# Patient Record
Sex: MALE | Race: WHITE
[De-identification: names, ages, dates, MRNs, and addresses within clinical notes are randomized per-mention and may not be internally consistent; named-entity substitution may affect disease eponyms.]

---

## 2018-11-23 ENCOUNTER — HOSPITAL ENCOUNTER (INPATIENT)
Dept: HOSPITAL 75 - IRF | Age: 78
LOS: 6 days | Discharge: ANOTHER HOSPITAL | DRG: 559 | End: 2018-11-29
Attending: INTERNAL MEDICINE | Admitting: PHYSICAL MEDICINE & REHABILITATION
Payer: MEDICARE

## 2018-11-23 VITALS — WEIGHT: 193.5 LBS | HEIGHT: 64 IN | BODY MASS INDEX: 33.03 KG/M2

## 2018-11-23 VITALS — SYSTOLIC BLOOD PRESSURE: 169 MMHG | DIASTOLIC BLOOD PRESSURE: 79 MMHG

## 2018-11-23 VITALS — SYSTOLIC BLOOD PRESSURE: 168 MMHG | DIASTOLIC BLOOD PRESSURE: 74 MMHG

## 2018-11-23 VITALS — SYSTOLIC BLOOD PRESSURE: 150 MMHG | DIASTOLIC BLOOD PRESSURE: 70 MMHG

## 2018-11-23 DIAGNOSIS — T81.44XA: ICD-10-CM

## 2018-11-23 DIAGNOSIS — K59.00: ICD-10-CM

## 2018-11-23 DIAGNOSIS — Z95.1: ICD-10-CM

## 2018-11-23 DIAGNOSIS — R33.9: ICD-10-CM

## 2018-11-23 DIAGNOSIS — D62: ICD-10-CM

## 2018-11-23 DIAGNOSIS — E78.5: ICD-10-CM

## 2018-11-23 DIAGNOSIS — Z47.89: Primary | ICD-10-CM

## 2018-11-23 DIAGNOSIS — A04.72: ICD-10-CM

## 2018-11-23 DIAGNOSIS — T84.216A: ICD-10-CM

## 2018-11-23 DIAGNOSIS — Z79.4: ICD-10-CM

## 2018-11-23 DIAGNOSIS — N18.9: ICD-10-CM

## 2018-11-23 DIAGNOSIS — I25.10: ICD-10-CM

## 2018-11-23 DIAGNOSIS — N40.1: ICD-10-CM

## 2018-11-23 DIAGNOSIS — E11.9: ICD-10-CM

## 2018-11-23 DIAGNOSIS — Z87.891: ICD-10-CM

## 2018-11-23 DIAGNOSIS — G06.1: ICD-10-CM

## 2018-11-23 DIAGNOSIS — N31.9: ICD-10-CM

## 2018-11-23 DIAGNOSIS — I48.0: ICD-10-CM

## 2018-11-23 DIAGNOSIS — I12.9: ICD-10-CM

## 2018-11-23 PROCEDURE — 82962 GLUCOSE BLOOD TEST: CPT

## 2018-11-23 PROCEDURE — 80202 ASSAY OF VANCOMYCIN: CPT

## 2018-11-23 PROCEDURE — 87081 CULTURE SCREEN ONLY: CPT

## 2018-11-23 PROCEDURE — 83540 ASSAY OF IRON: CPT

## 2018-11-23 PROCEDURE — 86141 C-REACTIVE PROTEIN HS: CPT

## 2018-11-23 PROCEDURE — 80053 COMPREHEN METABOLIC PANEL: CPT

## 2018-11-23 PROCEDURE — 72131 CT LUMBAR SPINE W/O DYE: CPT

## 2018-11-23 PROCEDURE — 36415 COLL VENOUS BLD VENIPUNCTURE: CPT

## 2018-11-23 PROCEDURE — 85025 COMPLETE CBC W/AUTO DIFF WBC: CPT

## 2018-11-23 PROCEDURE — 85652 RBC SED RATE AUTOMATED: CPT

## 2018-11-23 RX ADMIN — LACTOBACILLUS ACIDOPHILUS / LACTOBACILLUS BULGARICUS SCH GM: 100 MILLION CFU STRENGTH GRANULES at 16:56

## 2018-11-23 RX ADMIN — OXYBUTYNIN CHLORIDE SCH MG: 5 TABLET ORAL at 20:18

## 2018-11-23 RX ADMIN — METOPROLOL TARTRATE SCH MG: 25 TABLET, FILM COATED ORAL at 20:18

## 2018-11-23 RX ADMIN — INSULIN ASPART SCH UNIT: 100 INJECTION, SOLUTION INTRAVENOUS; SUBCUTANEOUS at 17:08

## 2018-11-23 RX ADMIN — ACETAMINOPHEN SCH MG: 325 TABLET ORAL at 17:02

## 2018-11-23 RX ADMIN — SODIUM CHLORIDE SCH MLS/HR: 900 INJECTION, SOLUTION INTRAVENOUS at 20:16

## 2018-11-23 RX ADMIN — CHOLESTYRAMINE SCH GM: 4 POWDER, FOR SUSPENSION ORAL at 17:08

## 2018-11-23 RX ADMIN — LACTOBACILLUS ACIDOPHILUS / LACTOBACILLUS BULGARICUS SCH GM: 100 MILLION CFU STRENGTH GRANULES at 20:18

## 2018-11-23 RX ADMIN — METFORMIN HYDROCHLORIDE SCH MG: 500 TABLET, FILM COATED ORAL at 16:55

## 2018-11-23 RX ADMIN — CHOLESTYRAMINE SCH GM: 4 POWDER, FOR SUSPENSION ORAL at 21:51

## 2018-11-23 RX ADMIN — COMPOUNDING SYRUP VEHICLE SCH ML: 1 SYRUP at 17:05

## 2018-11-23 RX ADMIN — INSULIN ASPART SCH UNIT: 100 INJECTION, SOLUTION INTRAVENOUS; SUBCUTANEOUS at 20:59

## 2018-11-23 NOTE — PHYSICAL THERAPY EVALUATION
PT Evaluation-General


Medical Diagnosis


Admission Date





Medical Diagnosis:  sepsis, spinal abscess


Onset Date:  2018





Therapy Diagnosis


Therapy Diagnosis:  impaired mobility, strength, endurance





Height/Weight


Height (Feet):  5


Height (Inches):  4.00


Weight (Pounds):  186


Weight (Ounces):  0.0





Precautions


Precautions/Isolations:  Contact Isolation





Referral


Physician:  Lance


Reason for Referral:  Evaluation/Treatment





Medical History


Pertinent Medical History:  Atrial Fib, CABG, CAD, DM, HTN


Current History


Patient admitted to ICU following hardware replacement of spine due to wound 

infection.


Reviewed History:  Yes





Social History


Home:  Single Level


Current Living Status:  Spouse


Entry Into Home:  Stairs With Railing


PT Steps Into Home:  3





Prior/Core FIM


Prior Level of Function


              Functional [place] Measure


0=Not Assessed/NA   4=Minimal Assistance


1=Total Assistance   5=Supervision or Setup


2=Maximal Assistance   6=Modified [place]


3=Moderate Assistance   7=Complete IndependenceIRFPAI Quality Coding Scale











6 Independent with activity with or without an assistive device


 


5  Patient requires set up or clean up by helper.  Patient completes activity  

by  themselves


 


4 Supervision or touching assist (CGA). [place] provide cues , steadying assist


 


3 The helper provides less than half the effort to complete the activity


 


2 The helper provides more than half the effort to complete the activity


 


1 Dependent.  The helper does all the effort to complete an activity 


 


7 Patient refused to complete or attempt activity


 


9 The patient did not perform the activity before the current illness or injury


 


88 Not attempted due to Medical conditions or safety concerns








Bed Mobility:  6


Transfers (B,C,W/C) (FIM):  6


Gait:  6


Stairs:  6


Wheelchair Mobility:  6


Patient states he already has a rolling walker, 4 wheeled walker, and wheelchair





PT Evaluation-Current


Subjective


Patient in bed pre tx, agrees to PT, has 10/10 pain in low back and right hip 

and groin.  Nurse notified and he got pain meds.  Patient is very tired and has 

increased pain because he had a lot of bleeding from back earlier and doctor 

had to cauterize.





Pt/Family Goals


Patient states he wants fo be able to go back home and take care of himself.





Objective


Patient Orientation:  Person, Place, Situation


Attachments:  Drains


TLSO





ROM/Strength


ROM Lower Extremities


WNL


Strenght Lower Extremities


3+/5 gross bilateral lower extremities





Neuromuscular


(Tone, Coordination, Reflexes)


NT





Sensory


Vision:  Functional


Hearing:  Impaired


Sensation Right Lower Extremit:  Intact


Sensation Left Lower Extremity:  Intact





Transfers


              Functional [place] Measure


0=Not Assessed/NA   4=Minimal Assistance


1=Total Assistance   5=Supervision or Setup


2=Maximal Assistance   6=Modified [place]


3=Moderate Assistance   7=Complete IndependenceIRFPAI Quality Coding Scale











6 Independent with activity with or without an assistive device


 


5  Patient requires set up or clean up by helper.  Patient completes activity  

by  themselves


 


4 Supervision or touching assist (CGA). [place] provide cues , steadying assist


 


3 The helper provides less than half the effort to complete the activity


 


2 The helper provides more than half the effort to complete the activity


 


1 Dependent.  The helper does all the effort to complete an activity 


 


7 Patient refused to complete or attempt activity


 


9 The patient did not perform the activity before the current illness or injury


 


88 Not attempted due to Medical conditions or safety concerns








Transfers (B, C, W/C) (FIM):  3


Scooting:  3


Rolling:  3


Roll Left to Right (QC):  2


Supine to/from Sit:  3


Sit to/from Stand:  4


bed t/f WC(FIM only if WC use):  4


Sit to Lying (QC):  2


Lying to Sitting/Side of Bed(Q:  2


Sit to Stand (QC):  3


Chair/Bed-to-Chair Xfer(QC):  3


Patient was only able to perform bed mobility and transfers and a little 

ambulation.  By the time he got to rehab he insisted on going back to bed due 

to pain and fatigue.    Patient performs bed mobility with mod assist, supine <-

> sit with mod assist, sit <-> stand with min assist, transfers with min 

assist.  Cues for safety and hand placement.





Gait


Does the Patient Walk?:  Yes


Mode of Locomotion:  Walk


Anticipated Mode of Locomotion:  Walk


Gait (FIM):  1


Walk 10 feet (QC):  4


Walk 50 ft with 2 Turns(QC):  88


Walk 150 ft (QC):  88


Walking 10ft/uneven surface-QC:  88


Distance:  10'


Gait Level of Assist:  4


Gait Persons Needed:  1


Gait Assistive Device:  FWW


Comments/Gait Description


Patient ambulated 10' with a rolling walker with CGA.  Gait is very antalgic, 

slow.  He could not walk any more due to pain and fatigue.





Wheelchair Training


Does the Pt Use a Wheelchair?:  Yes


Wheelchair (FIM):  1


Type of Wheelchair:  Manual





Stairs


If not tested on admit;explain


Patient could not perform stairs due to pain and fatigue.  He had recent blood 

loss from wound in back and had to be cauterized and came to rehab right after.





Balance


Sitting Static:  Fair


Sitting Dynamic:  Fair


Standing Static:  Fair


 Standing Dynamic:  Fair





Assessment/Needs


Patient has impaired mobility, strength, endurance post spinal surgery.  He 

also has a wound vac in back.


Rehab Potential:  Guarded





PT Short Term Goals


Short Term Goals


Time Frame:  2018


Transfers (B,C,W/C) (FIM):  4


Gait (FIM):  2


Gait Distance Comment:  50'


Gait Level of Assist:  4


Gait Assistive Device:  FWW





PT Long Term Goals


Long Term Goals


PT Long Term Goals Time Frame:  Dec 14, 2018


Transfers (B,C,W/C) (FIM):  5


Sit to Lying (QC):  4


Lying-Sitting on Side/Bed(QC):  4


Sit to Stand (QC):  4


Rollin


Roll Left to Right (QC):  4


Chair/Bed-to-Chair Xfer(QC):  4


Car Transfer (QC):  4


Gait (FIM):  5


Distance:  150'


Walk 10 feet (QC):  4


Walk 10ft-Uneven Surface(QC):  4


Walk 50ft with 2 Turns (QC):  4


Walk 150 ft (QC):  4


Gait Level of Assist:  5


Gait Assistive Device:  FWW


Stairs (FIM):  2


# of Steps:  4


1 Step (curb) (QC):  4


4 Steps (QC):  4


Stairs Level Of Assist:  4





PT Plan


Problem List


Problem List:  Activity Tolerance, Functional Strength, Safety, Balance, Gait, 

Transfer, Bed Mobility, ROM





Treatment/Plan


Treatment Plan:  Continue Plan of Care


Treatment Plan:  Bed Mobility, Concurrent Therapy, Education, Functional 

Activity Samina, Functional Strength, Group Therapy, Gait, Safety, Therapeutic 

Exercise, Transfers


Treatment Duration:  Dec 14, 2018


Frequency:  Modified Program (IRF)


Estimated Hrs Per Day:  1.5 hours per day


Patient and/or Family Agrees t:  Yes





Safety Risks/Education


Patient Education:  Gait Training, Transfer Techniques, Reviewed Precautions, 

Correct Positioning, W/C Management, Safety Issues


Teaching Recipient:  Patient


Teaching Methods:  Demonstration, Discussion


Response to Teaching:  Reinforcement Needed





Discharge Recommendations


Plan


Patient will perform bed mobility and transfer training, balance and endurance 

training, functional strengthening, stair training, gait training, and education

, to improve functional mobility and independence at home.


Therapy D/C Recommendations:  Home w/ Family Support





Time/GCodes


Time In:  1300


Time Out:  1330


Total Billed Treatment Time:  30


Total Billed Treatment


1 visit


FRANCIE 30'











MARY SHEPARD PT 2018 13:44

## 2018-11-23 NOTE — ST COGNITIVE LINGUISTIC EVAL
Speech Evaluation-General


Medical Diagnosis


sepsis, spinal abscess


Onset Date:  2018





Therapy Diagnosis


Therapy Diagnosis:  Cognitive-communication





Precautions


Precautions:  Fall


Precautions/Isolations:  Contact Isolation





Medical History


Pertinent Medical History:  Atrial Fib, CABG, CAD, DM, HTN


Reviewed History:  Yes





Social History


Current Living Status:  Spouse





Speech PLF-Current Status


Prior Level of Function





Patient lived at home with his wife. Prior to back surgery in October he


was independent with all daily living tasks.





Subjective


Patient was pleasant and cooperative.





Language Eval: Auditory


Comprehends Simple Yes/No Ques:  Functional


Follows 1-Step Commands:  Mild


Follows Complex Directions:  Moderate


Follows General Conversations:  Mild





Language Eval: Verbal Language


Completes Spontaneous Greeting:  Functional


Produces Auto, Serial Info:  Functional


Imitates Simple Words/Phrases:  Functional


Word Finding:  Mild


Requests Basic Needs:  Mild


States Basic Personal Info:  Mild


Expresses Complex Ideas:  Moderate





Language Evaluation: Reading


Did not test.





Cognitive


Patient Orientation


Patient is oriented to all concepts.





Objective Cognitive Domain


Attention:  Mild


Memory:  Moderate


Problem Solving:  Mild


Executive Functions:  Mild





Objective


Formal/Standardized Tests


[facility] Cognitive/Communication


Results


Memory: for Immediate Recall 3:3, Delayed Recall 3:3, Advanced Recall: 2:3 with 

cues, Organization/Sequencing: 3-4 step: 3:4, Reasoning Skills: 3:4, Verbal 

Expression: Automatic: Mild deficit, Responsive Naming: Mild deficit


Oral Motor/Speech Production


Within Functional Limits


Impression


Patient is an alert and oriented 79 y/o male who recently underwent back 

surgery. Formal testing indicates he has mild to moderate memory and problem 

solving deficits related to his daily needs and immediate living environment. 

He is recommended for skilled services to address these deficits to achieve his 

highest potential functional level for optimal safety and independence,





Communication/Social Cognition


Comprehension:  4


Expression:  4


Social Interaction:  4


Problem Solving:  3


Memory:  3





Speech Patient Assess


Expression of Ideas/Wants:  Frequently (2)


Understanding Verbal Content:  Sometimes Understands(2)


Brief Interview-Mental Status:  Yes


Repetition of Three Words:  Two (2)


Temporal Orientation: Year:  Correct (3)


Temporal Orientation: Month:  Accurate within 5 days(2)


Temporal Orientation: Day:  Correct (1)


Recall : Wear to say "Sock":  Yes,after cueing (1)


Recall : Color:  Yes, no cue required (2)


Recall : Bed:  Yes, no cue required (2)


Memory/Recall Ability:  Current season, That he or she is in a hsp/hsp unit





Speech Short Term Goals


Short Term Goals


Short Term Goals


1) Patient will complete memory tasks with 90% accuracy given minimal cuing.


2) Patient will complete problem solving tasks with 90% accuracy given minimal 

cuing.


Time Frame-ST week





Speech Long Term Goals


Long Term Goals


Patient will improve memory and problem solving tasks with minimal cues for 

increased safety and independence at 90% accuracy.





Speech-Plan


Patient/Family Goals


Patient/Family Goals:  


Patient plans to mreturn home with his wife after rehab.





Treatment Plan


Speech Therapy Treatment Plan:  Continue Plan of Care


Patient will work on memory and problem solving while on the rehab unit.


Frequency:  Modified Program (IRF)


Estimated Hrs Per Day:  .5 hour per day


Rehab Potential:  Guarded


Barriers to Learning:  


Memory


Pt/Family Agrees to Plan:  Yes





Safety Risks/Education


Teaching Recipient:  Patient, Significant Other


Teaching Methods:  Discussion


Response to Teaching:  Verbalize Understanding





Time


Speech Therapy Time In:  14:15


Speech Therapy Time Out:  14:30


Total Billed Time:  15


Billed Treatment Time


1, SAURABH Cabrera 2018 14:49

## 2018-11-23 NOTE — OCCUPATIONAL THERAPY EVAL
OT Evaluation-General/PLF


Medical Diagnosis


Admission Date


2018 at 13:25


Medical Diagnosis:  sepsis, spinal abscess


Onset Date:  2018





Therapy Diagnosis


Therapy Diagnosis:  decr self care, weakness, decr funct mob, decr act jj





Height/Weight


Height (Feet):  5


Height (Inches):  4.00


Weight (Pounds):  186


Weight (Ounces):  0.0





Precautions


Precautions/Isolations:  Contact Isolation, Contact/Enteric Isolation (c diff)





Referral


Physician:  Lance


Referral Reason:  Evaluation/Treatment





Medical History


Pertinent Medical History:  Atrial Fib, CABG, CAD, DM, HTN


Additional Medical History


Chronic back pain. BPH, hyperlipidemia. Pt reported cataract surgery and two 

other surgeries on L eye


Current History


18 back surgery. Has been in SNF and [facility]. Pt had spinal abscess 

and is now s/p 2 surgeries (, ) to replace hardware. Today he had 

bleeding in his back and would vac had to be replaced. Also had to cauterize 

bleeders in back.


Reviewed History:  Yes





Social History


Home:  Single Level


Current Living Status:  Spouse


Entry Into Home:  Stairs With Railing


 Steps Into Home:  3





ADL-Prior Level of Function


              Functional [place] Measure


0=Not Assessed/NA   4=Minimal Assistance


1=Total Assistance   5=Supervision or Setup


2=Maximal Assistance   6=Modified [place]


3=Moderate Assistance   7=Complete [place]


ADL PLOF Comments


Pt and wife reported that, prior to back surgery in , he did not need any 

help taking care of himself. He was able to manage all of his basic ADLs, do 

things around the house, manage medications and check book. He still drives and 

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


DME/Equipment:  Bath Chair, Shower, Tall Toilet





OT Current Status


Subjective


Pt seen in room, up in bed on his side, agreeable to OT. Pain reported 10/10 in 

back and in L groin. He had pain meds just prior to arrival on the unit.





Appearance


Alert, cooperative





Mental Status/Objective


Patient Orientation:  Person, Situation


Attachments:  Central Line, IV, Other-See Comments (wound vac)





Current


Glasses/Contacts:  Yes


Hearing Aids:  Yes


Dentures/Partials:  No


Hand Dominance:  Right


Upper Extremity ROM


Grossly WFL bilat


Upper Extremity Strength


Grossly 4/5 bilat





ADL-Treatment


ADL-Current


Pt declined ADLs due to extreme fatigue and pain. He was able to walk only 10 

feet with PT with FWW, CGA and needed mod assist with bed mobility. He reported 

that he hasn't felt like eating much since his surgery. TLSO


              Functional [place] Measure


0=Not Assessed/NA   4=Minimal Assistance


1=Total Assistance   5=Supervision or Setup


2=Maximal Assistance   6=Modified [place]


3=Moderate Assistance   7=Complete IndependenceIRFPAI Quality Coding Scale











6 Independent with activity with or without an assistive device


 


5  Patient requires set up or clean up by helper.  Patient completes activity  

by  themselves


 


4 Supervision or touching assist (CGA). [place] provide cues , steadying assist


 


3 The helper provides less than half the effort to complete the activity


 


2 The helper provides more than half the effort to complete the activity


 


1 Dependent.  The helper does all the effort to complete an activity 


 


7 Patient refused to complete or attempt activity


 


9 The patient did not perform the activity before the current illness or injury


 


88 Not attempted due to Medical conditions or safety concerns











Education


OT Patient Education:  Purpose of tx/functional activities, Rehab process


Teaching Recipient:  Patient, Family


Teaching Methods:  Discussion


Response to Teaching:  Verbalize Understanding





OT Short Term Goals


Short Term Goals


Time Frame:  2018


Eating(FIM):  5


Grooming(FIM):  5


Bathing(FIM):  3


Upper Body Dressing(FIM):  4


Lower Body Dressing(FIM):  3


Toileting(FIM):  3


Toilet/Commode Transfer(FIM):  4


Additional Short Term Goals:  1-Demonstrate ADL Tasks, 2-Verbalize Understanding

, 3-ImproveStrength/[place]


1=Demonstrate adherence to instructed precautions during ADL tasks.


2=Patient will verbalize/demonstrate understanding of assistive devices/

modifications for ADL.


3=Patient will improve strength/tolerance for activity to enable patient to 

perform ADL's.





OT Long Term Goals


Long Term Goals


Time Frame:  Dec 14, 2018


Eating (FIM):  7


Eating (QC):  6


Groomin


Oral Hygiene (QC):  6


Bathing(FIM):  6


Shower/Bathe Self (QC):  6


Upper Body Dressing(FIM):  6


Upper Body Dressing (QC):  6


Lower Body Dressing(FIM):  6


Lower Body Dressing (QC):  6


On/Off Footwear (QC):  6


Toileting(FIM):  6


Toileting Hygiene (QC):  6


Toilet/Commode Transfer(FIM):  6


Toilet/Commode Transfer (QC):  6


Shower Transfer(FIM):  5 (if allowed per wound vac)


Additional Goals:  1-Demonstrate ADL Tasks, 2-Verbalize Understanding, 3-

ImproveStrength/[place]


1=Demonstrate adherence to instructed precautions during ADL tasks.


2=Patient will verbalize/demonstrate understanding of assistive devices/

modifications for ADL.


3=Patient will improve strength/tolerance for activity to enable patient to 

perform ADL's.





OT Education/Plan


Problem List/Assessment


Assessment:  Decreased Activ Tolerance, Decreased UE Strength, Dependent 

Transfers, Impaired Bed Mobility, Impaired Self-Care Skills


Pt would benefit from skilled OT to increase his independence in basic self 

care to allow him to safely return home after back surgery





Discharge Recommendations


Plan/Recommendations:  Continue POC





Treatment Plan/Plan of Care


Treatment,Training & Education:  Yes


Patient would benefit from OT for education, treatment and training to promote 

independence in ADL's, mobility, safety and/or upper extremity function for ADL'

s.


Plan of Care:  ADL Retraining, Functional Mobility, Group Exercise/Act as Ind (

education, exercise, activity tolerance, functional activities, socialization), 

UE Funct Exercise/Act, UE Neuromus Re-Ed/Coord, W/C Management Training


Treatment Duration:  Dec 14, 2018


Frequency:  Modified Program (IRF)


Estimated Hrs Per Day:  1.5 hours per day (1.25 to 1.5)


Agreement:  Yes


Rehab Potential:  Fair





Time/GCodes


Start Time:  13:30


Stop Time:  14:10


Total Time Billed (hr/min):  40


Billed Treatment Time


visit, 40 minutes evaluation moderate intensity











FERNANDO SMITH OT 2018 14:32

## 2018-11-23 NOTE — HISTORY AND PHYSICAL
DATE OF SERVICE:  11/23/2018



CHIEF COMPLAINT:

Difficulty with walking.



HISTORY OF PRESENT ILLNESS:

The patient is an 78-year-old male who recently had spinal surgery earlier this

year, then had a complication of an infection.  He had another procedure

10/12/2018, but still had problems and he was on rehab unit in [place] as well as

a skilled nursing unit in [place].  He was eventually transferred to [facility] and followed by his orthospine Dr. Bustos and seen in consultation by

hospitalist service and cardiology.  The patient was placed on IV antibiotics

for a spinal abscess associated with fever and sepsis, tachycardia,

leukocytosis, rigors.  This was brought under control.  He had a recent UTI

enterococcus, had a course of Cipro for that.  He had postop anemia, had a unit

packed red blood cells.  He had some bleeding from the incision site and he

received a cautery with Dr. Bustos's staff prior to transfer to rehab unit.  He

currently has a wound VAC in place.  Currently, he fatigues easily.  His wife

presents with him and stays overnight.  They live in [place], [place] and he

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  His

original spinal surgery was 06/20/2018.  He is mod assist for bed mobility.  He

has a TLSO worn up.  He complains of fatigue and pain with ambulation 10 feet

with a front wheel walker, contact guard.  Speech therapy noted some cognitive

deficits and the patient's wife indicates that this has been since his surgery

10/12/2018.  He is mod to max assist for transfers.  He requires assistance for

his dressing, bathing.  He is modified independent for eating, set up for

grooming.  The patient also developed C. diff bowel colitis, currently is on

p.o. vancomycin and has contact precautions.



PAST MEDICAL HISTORY:

Atrial fibrillation, on amiodarone, left ventricular hypertrophy,Diastolic

dysfunction, coronary artery disease status post CABG.



PAST SURGICAL HISTORY:

As per above.



ALLERGIES:

KEFLEX, HYDROCODONE.



FAMILY HISTORY:

Noncontributory.



SOCIAL HISTORY:

Essentially as per above.  They live in a one story home in [place], [place]. 

Wife indicates that the patient had been independent prior to all this.  He is

quite hard of hearing and does not have his hearing aids currently.



REVIEW OF SYSTEMS:

A 10-point review of systems significant for weakness, fatigue, back pain,

hearing loss.



MEDICATIONS:

Vancomycin q.6h. p.o., amiodarone 200 mg p.o. daily, Lipitor 5 mg p.o. daily,

amlodipine 5 mg p.o. daily, lisinopril 20 mg p.o. daily, allopurinol 300 mg p.o.

daily, Actos 45 mg p.o. daily, MiraLax 17 grams p.o. at bedtime, Lopressor 25 mg

p.o. b.i.d., Ditropan 5 mg p.o. t.i.d., Tylenol 650 mg p.o. q.6 hours, metformin

500 mg p.o. b.i.d., Questran 4 grams p.o. q.i.d. a.c. and at bedtime, Lactinex 1

gram p.o. q.i.d. a.c. and at bedtime, sliding scale insulin regimen, Zofran 4 mg

q.6 hours p.r.n. nausea or vomiting, tramadol 50 mg 1-2 tablets p.o. q.4 hours

p.r.n. moderate pain.



PHYSICAL EXAMINATION:

GENERAL:  Significant for an elderly male lying in bed, quite hard of hearing. 

No acute distress.

VITAL SIGNS:  He is afebrile, pulse 79, respirations 20, blood pressure 168/74,

O2 sat 93% on room air.

HEENT:  Quite hard of hearing.  Vision and speech grossly intact.  No oral

lesion is noted.

NECK:  Supple without mass.

HEART:  Regular rhythm.

CHEST:  Clear.

ABDOMEN:  Soft, nontender, bowel sounds present.

EXTREMITIES:  No leg edema, no calf tenderness.

SKIN:  Wound VAC in place.

MUSCULOSKELETAL:  He has functional active range of motion in all four limbs.

NEUROLOGIC:  Sensation is grossly intact to touch.  Cognition and memory mildly

impaired.  Strength both lower limbs 3+/5, upper limbs 4/5.  He is right hand

dominant.



IMPRESSION:

1.  Ambulatory dysfunction secondary to spinal abscess with sepsis, treated now

with wound VAC.

2.  Clostridium difficile bowel colitis, on vancomycin p.o. and contact

precautions.

3.  Atrial fibrillation, controlled with medication.

4.  Coronary artery disease, status post CABG.

5.  Diabetes mellitus, controlled with medication.

6.  Postop anemia status post transfusion.

7.  Hyperlipidemia.



PLAN:

The patient is admitted for a comprehensive program of inpatient rehabilitation 
with

goal of maximizing level of functional independence prior to discharge home with

spouse.  The patient will have PT, OT 90 minutes per day each discipline 5 days

a week for 2 weeks for the above goals in mind.  Please see post-admission

physician evaluation, which is a separate document for details of plan of care. 

Speech therapy has done cognitive assessment, will treat as indicated 3 to 5

times a week for 30 to 45 minutes per day regarding improving cognition, memory,

compensatory techniques.  Rehabilitation nursing assist with bowel, bladder,

skin, wound care, medication administration, wound VAC administration, pain

management and  with discharge planning, community reentry. 

Follow up with hospital service and Dr. Bustos and cardiology as per the

schedule.



ESTIMATED LENGTH OF STAY:

Two weeks.



PROGNOSIS:

Rehab prognosis appears good for goal of discharging home with spouse, modified

independent to supervision for ADLs and mobility skills.



DIET:

Regular.



CODE STATUS:

Full code.





Job ID: [id number]

DocumentID: [id number]

Dictated Date:  11/23/2018 21:39:25

Transcription Date: 11/23/2018 23:11:48

Dictated By: CJ BROKC MD

MTDD

## 2018-11-23 NOTE — PM&R POST ADMISSION ASSESSMENT
Post Admission Physician Asses


Date seen by provider:  Nov 23, 2018


Time seen by provider:  20:55


The preadmission screen agrees with the post admission assessment that the 

patient is a good candidate for inpatient rehabilitation.





The patient will have a comprehensive program of inpatient rehabilitation with 

a goal of maximizing level of functional independence prior to discharge home 

with spouse.   The patient will have PT/OT ninety minutes per day, each 

discipline, five days a week for for 2 weeks for gait, strengthening, 

conditioning, balance, ADLs, any patient/family/caregiver training as 

necessary.  Speech therapy to do cognitive assessment and treat as indicated 

for 3 to 5 days a week for 2 weeks for 30 to 45 min per day. Rehabilitation 

nursing to assist with bowel, bladder, skin, wound care,wound vac administration

, medication administration, pain management.   to assist with 

discharge planning, community reentry. SCD's for DVT prophylaxis.





He appears to be well motivated to participate in three hours of therapy a day.

  He should be able to tolerate three hours of therapy a day from a medical and 

surgical standpoint.  He should benefit from the three hours of therapy a day.  

He has a reasonable discharge plan, reasonable discharge rehabilitation goals 

and a supportive family. He has various comorbidities that need to be closely 

monitored with medications and treatments adjusted on a daily basis as needed. 

he may require a 15/7 therapy schedule initially.


These include:


Postop spinal abscess


A FIB


DM


Hyponatremia





Barriers to discharge for this patient who had been independent prior to this 

are for him to be modified independent to supervision for ADLs and mobility 

skills prior to discharge home with spouse, so as to lessen the burden of the 

caregivers. 





Risks for this patient include:


 1.  Fall


 2.  Fracture


 3.  DVT


 4.  Pulmonary embolism


 5.  Wound infection


 6.  Skin breakdown


 7.  Contractures


 8.  Poorly controlled pain


 9.  Urinary retention


10.  UTI


11.  Respiratory infection


12.  Aspiration


13.recurrent A FIB





Estimated Length of Stay:  14 days





Prognosis:  Rehab prognosis appears good for goal of discharge home with spouse 

modified independent to supervision for ADLs and mobility skills.


Date Identified:  Nov 23, 2018


Time Identified:  20:30


Action Plan to Resolve CSMI:  


Transfer meds reviewed with RN


General:  Alert, Cooperative, No Acute Distress


HEENT:  Atraumatic, PERRLA, EOMI, Mucous Memb Moist/Pink, Other ([place] has 

hearing aids but out for the evening)


Neck:  Supple, No JVD


Lungs:  Clear to Auscultation


Heart:  Regular Rate


Abdomen:  Normal Bowel Sounds, Soft, No Tenderness


Extremities:  No Edema


Skin:  Other (has wound vac over incision)


Neuro:  Other (cognitive impairment with memory strength 4/5 uppers 3+/5 lowers 

sensation intact)


Psych/Mental Status:  Other (Mild memory impairment)











CJ BROCK MD Nov 23, 2018 21:25

## 2018-11-23 NOTE — CARDIOLOGY PROGRESS NOTE
Cardiology SOAP Progress Note


Subjective:


No cardiac compliants





Objective:


I&O/Vital Signs











 11/23/18





 13:54


 


Temp 98.9


 


Pulse 70


 


Resp 16


 


B/P (MAP) 150/70 (96)


 


Pulse Ox 100


 


O2 Delivery Room Air








Weight (Pounds):  193


Weight (Ounces):  8.0


Weight (Calculated Kilograms):  87.642790


Constitutional:  No appears stated age; AAO x 3; No apparent distress, No PERRL

, No well-developed, No well-nourished, No other


Respiratory:  No accessory muscle use, No respiratory distress, No chest tender

, No chest expansion is symmetric; chest is bilaterally symmetric; No lungs 

clear to percussion; lungs clear to auscultation; No crackles, No rhonchi, No 

rales, No stridor, No wheezing, No pleural rub, No other


Cardiovascular:  regular rate-rhythm; No irregularly irregular, No extra beats, 

No parasternal heave is noted, No JVD, No edema, No bradycardia, No tachycardia

, No point of maximal impulse, No cardiac thrills are palpable; S1 and S2; No 

gallop/S3, No gallop/S4, No diastolic murmur, No systolic murmur, No friction 

rub, No click, No other


Gastrointestional:  No tender, No soft, No round, No distended, No pulsatile 

mass, No organomegaly, No guarding, No rebound, No tenderness, No hernia, No 

mass, No audible bowel sounds, No abnormal bowel sounds, No abdominal bruits, 

No spleenomegaly, No other


Extremities:  No normal range of motion, No non-tender, No normal inspection, 

No pedal edema, No calf tenderness, No normal capillary refill, No pelvis stable

, No calf tenderness, No inflammation, No pedal edema, No slow capillary refill

, No swelling, No other, No abrasion, No clubbing, No cyanosis, No ecchymosis, 

No laceration, No no lower extremity edema bilateral, No significant edema, No 

tenderness, No wound


Neurologic/Psychiatric:  no motor/sensory deficits, alert, normal mood/affect, 

oriented x 3


Skin:  No normal color, No warm/dry, No cyanosis, No cool, No diaphoresis, No 

damp, No ecchymosis, No jaundice, No mottled, No pallor, No rash, No tattoos/

piercings, No ulcerations, No rash on exposed areas, No ulcerations on exposed 

areas, No other





A/P:


Assessment/Dx:


Assessment/Dx:


Spinal abscess,


Sepsis,


UTI,


History of CAD, CABG,


Atrial fibrillation on amiodarone.


LVH,


Diastolic dysfunction.


Plan:





Spinal abscessstatus post I&D. 


Sepsiscontinue broad-spectrum IV antibiotics as per Dr. Pro.  C. difficile, 

on contact precautions.


Paroxysmal atrial fibrillationcurrently in sinus rhythm.  Continue amiodarone.

  I might consider discontinuing amiodarone but since the patient follows Dr. Eckert in [place] I may defer that decision to him.


CAD/history of CABGcontinue lisinopril and statin therapy.  Aspirin low dose 

once okay with Dr. Pro and Dr. Bustos.  Will add low dose beta blocker for 

atrial fibrillation and CAD.


LVH,


Diastolic dysfunction,


Echocardiogram showed normal LV function.


Patient follows with Dr. Eckert in [place].








Thank you for your consultation. Please call me if you have any questions.








KENDALL Zhu MD, FACP, FACC, FSCAI, FHRS, CCDS


Interventional Cardiology


Cardiac Electrophysiology


Vascular Medicine and Endovascular Interventions











MARCIN ZHU MD Nov 23, 2018 17:12

## 2018-11-24 VITALS — SYSTOLIC BLOOD PRESSURE: 153 MMHG | DIASTOLIC BLOOD PRESSURE: 76 MMHG

## 2018-11-24 VITALS — SYSTOLIC BLOOD PRESSURE: 160 MMHG | DIASTOLIC BLOOD PRESSURE: 73 MMHG

## 2018-11-24 VITALS — DIASTOLIC BLOOD PRESSURE: 74 MMHG | SYSTOLIC BLOOD PRESSURE: 134 MMHG

## 2018-11-24 RX ADMIN — LISINOPRIL SCH MG: 20 TABLET ORAL at 09:39

## 2018-11-24 RX ADMIN — OXYBUTYNIN CHLORIDE SCH MG: 5 TABLET ORAL at 20:13

## 2018-11-24 RX ADMIN — CHOLESTYRAMINE SCH GM: 4 POWDER, FOR SUSPENSION ORAL at 11:23

## 2018-11-24 RX ADMIN — COMPOUNDING SYRUP VEHICLE SCH ML: 1 SYRUP at 23:55

## 2018-11-24 RX ADMIN — CHOLESTYRAMINE SCH GM: 4 POWDER, FOR SUSPENSION ORAL at 06:20

## 2018-11-24 RX ADMIN — LACTOBACILLUS ACIDOPHILUS / LACTOBACILLUS BULGARICUS SCH GM: 100 MILLION CFU STRENGTH GRANULES at 20:13

## 2018-11-24 RX ADMIN — ACETAMINOPHEN SCH MG: 325 TABLET ORAL at 12:48

## 2018-11-24 RX ADMIN — OXYBUTYNIN CHLORIDE SCH MG: 5 TABLET ORAL at 09:39

## 2018-11-24 RX ADMIN — POLYETHYLENE GLYCOL (3350) SCH GM: 17 POWDER, FOR SOLUTION ORAL at 14:41

## 2018-11-24 RX ADMIN — ATORVASTATIN CALCIUM SCH MG: 10 TABLET, FILM COATED ORAL at 09:40

## 2018-11-24 RX ADMIN — LACTOBACILLUS ACIDOPHILUS / LACTOBACILLUS BULGARICUS SCH GM: 100 MILLION CFU STRENGTH GRANULES at 06:48

## 2018-11-24 RX ADMIN — DOCUSATE SODIUM AND SENNOSIDES SCH EA: 8.6; 5 TABLET, FILM COATED ORAL at 14:41

## 2018-11-24 RX ADMIN — DOCUSATE SODIUM AND SENNOSIDES SCH EA: 8.6; 5 TABLET, FILM COATED ORAL at 20:23

## 2018-11-24 RX ADMIN — SODIUM CHLORIDE SCH MLS/HR: 900 INJECTION, SOLUTION INTRAVENOUS at 20:08

## 2018-11-24 RX ADMIN — METOPROLOL TARTRATE SCH MG: 25 TABLET, FILM COATED ORAL at 20:13

## 2018-11-24 RX ADMIN — INSULIN ASPART SCH UNIT: 100 INJECTION, SOLUTION INTRAVENOUS; SUBCUTANEOUS at 15:57

## 2018-11-24 RX ADMIN — COMPOUNDING SYRUP VEHICLE SCH ML: 1 SYRUP at 06:23

## 2018-11-24 RX ADMIN — ACETAMINOPHEN SCH MG: 325 TABLET ORAL at 18:11

## 2018-11-24 RX ADMIN — COMPOUNDING SYRUP VEHICLE SCH ML: 1 SYRUP at 18:11

## 2018-11-24 RX ADMIN — INSULIN ASPART SCH UNIT: 100 INJECTION, SOLUTION INTRAVENOUS; SUBCUTANEOUS at 21:00

## 2018-11-24 RX ADMIN — ACETAMINOPHEN SCH MG: 325 TABLET ORAL at 06:23

## 2018-11-24 RX ADMIN — LACTOBACILLUS ACIDOPHILUS / LACTOBACILLUS BULGARICUS SCH GM: 100 MILLION CFU STRENGTH GRANULES at 11:28

## 2018-11-24 RX ADMIN — ALLOPURINOL SCH MG: 300 TABLET ORAL at 09:39

## 2018-11-24 RX ADMIN — METOPROLOL TARTRATE SCH MG: 25 TABLET, FILM COATED ORAL at 09:39

## 2018-11-24 RX ADMIN — METFORMIN HYDROCHLORIDE SCH MG: 500 TABLET, FILM COATED ORAL at 16:49

## 2018-11-24 RX ADMIN — COMPOUNDING SYRUP VEHICLE SCH ML: 1 SYRUP at 12:52

## 2018-11-24 RX ADMIN — AMIODARONE HYDROCHLORIDE SCH MG: 200 TABLET ORAL at 09:39

## 2018-11-24 RX ADMIN — OXYBUTYNIN CHLORIDE SCH MG: 5 TABLET ORAL at 12:48

## 2018-11-24 RX ADMIN — METFORMIN HYDROCHLORIDE SCH MG: 500 TABLET, FILM COATED ORAL at 06:48

## 2018-11-24 RX ADMIN — ACETAMINOPHEN SCH MG: 325 TABLET ORAL at 23:49

## 2018-11-24 RX ADMIN — POLYETHYLENE GLYCOL (3350) SCH GM: 17 POWDER, FOR SOLUTION ORAL at 20:23

## 2018-11-24 RX ADMIN — PIOGLITAZONE SCH MG: 30 TABLET ORAL at 06:48

## 2018-11-24 RX ADMIN — LACTOBACILLUS ACIDOPHILUS / LACTOBACILLUS BULGARICUS SCH GM: 100 MILLION CFU STRENGTH GRANULES at 16:49

## 2018-11-24 RX ADMIN — COMPOUNDING SYRUP VEHICLE SCH ML: 1 SYRUP at 00:12

## 2018-11-24 RX ADMIN — INSULIN ASPART SCH UNIT: 100 INJECTION, SOLUTION INTRAVENOUS; SUBCUTANEOUS at 06:00

## 2018-11-24 RX ADMIN — AMLODIPINE BESYLATE SCH MG: 5 TABLET ORAL at 09:39

## 2018-11-24 RX ADMIN — ACETAMINOPHEN SCH MG: 325 TABLET ORAL at 00:12

## 2018-11-24 RX ADMIN — INSULIN ASPART SCH UNIT: 100 INJECTION, SOLUTION INTRAVENOUS; SUBCUTANEOUS at 11:22

## 2018-11-24 NOTE — OCCUPATIONAL THER DAILY NOTE
OT Current Status-Daily Note


Subjective


Pt seen in room, up in bed, agreeable to OT. Pain reported 7-8/10 and had 

recent pain meds.





Appearance


Alert, cooperative





Mental Status/Objective


              Functional [place] Measure


0=Not Assessed/NA   4=Minimal Assistance


1=Total Assistance   5=Supervision or Setup


2=Maximal Assistance   6=Modified [place]


3=Moderate Assistance   7=Complete [place]


Attachments:  Central Line, Other-See Comments (wound vac)





ADL-Treatment


Co-treat with PT due to significant decreased activity tolerance, pain, need 

for two different professionals to address needs. PT worked on bed mobility, 

balance, transfers while OT worked on ADLs, UE function. Pt sup-sit EOB for 

sponge bath, dressing, requiring supine rest breaks due to pain. Toilet/

transfer to [facility] beside bed, then pt walked briefly, using FWW, requiring second 

person assist to manage wound vac safely. Returned to recliner to brush teeth. 

Needed help to put TLSO on/off. Pt given one bilat UE ex to do when up and also 

provided with pink sponge with instructions to squeeze it 20 reps, both to help 

increase UE strength and manage edema in UEs. Pt left up in recliner, all needs 

met.


              Functional [place] Measure


0=Not Assessed/NA   4=Minimal Assistance


1=Total Assistance   5=Supervision or Setup


2=Maximal Assistance   6=Modified [place]


3=Moderate Assistance   7=Complete IndependenceIRFPAI Quality Coding Scale











6 Independent with activity with or without an assistive device


 


5  Patient requires set up or clean up by helper.  Patient completes activity  

by  themselves


 


4 Supervision or touching assist (CGA). [place] provide cues , steadying assist


 


3 The helper provides less than half the effort to complete the activity


 


2 The helper provides more than half the effort to complete the activity


 


1 Dependent.  The helper does all the effort to complete an activity 


 


7 Patient refused to complete or attempt activity


 


9 The patient did not perform the activity before the current illness or injury


 


88 Not attempted due to Medical conditions or safety concerns








Eating (FIM):  5 (Setup. Able to feed himself and get a drink. No dentures)


Eating (QC):  5 (setup)


Grooming (FIM):  5 (Setup to brush teeth, wash face and hands, seated)


Oral Hygiene (QC):  5 (setup)


Bathing (FIM):  3 (70%. Sponge bath, seated at EOB. Rolled side to side for 

washing bottom.)


Bathing Location:  L Arm, R Arm, L Upper Leg, R Upper Leg, Chest, Abdomen, 

Perineal Area


Shower/Bathe Self (QC):  3


Upper Body (FIM):  5 (Donned t shirt with setup. Setup to don TLSO)


Upper Body Dressing (QC):  5 (setup)


Lower Body Dressing (FIM):  2 (Help to doff/don slipper socks, help to get 

pants over feet. Pt pulled pants to thigh, then help to get pants up over 

bottom. Stood mod assist)


Lower Body Dressing (QC):  2


On/Off Footwear (QC):  1 (Unable )


Toileting (FIM):  1 (Two person assist when using BSC, one to manage standing 

and one to manage clothing. Unable to wipe in back. Setup with urinal, needing 

help to empty it)


Toileting Hygiene (QC):  1 (Two person)


Toilet/Commode Transfer (FIM):  3 (Mod assist bed to BSC beside bed)


Toilet Transfer (QC):  3


Shower Transfer(FIM):  0 (Not addressed due to wound vac)





Education


OT Patient Education:  Exercise program, Modified ADL techniques, Progress 

toward Goal/Update tx plan, Purpose of tx/functional activities, Safety issues, 

Transfer techniques, Use of adapted equipment


Teaching Recipient:  Patient, Family


Teaching Methods:  Demonstration, Discussion


Response to Teaching:  Verbalize Understanding, Return Demonstration, 

Reinforcement Needed





OT Short Term Goals


Short Term Goals


Time Frame:  2018


Eating(FIM):  5


Grooming(FIM):  5


Bathing(FIM):  3


Upper Body Dressing(FIM):  4


Lower Body Dressing(FIM):  3


Toileting(FIM):  3


Toilet/Commode Transfer(FIM):  4


Additional Short Term Goals:  1-Demonstrate ADL Tasks, 2-Verbalize Understanding

, 3-ImproveStrength/Samina


1=Demonstrate adherence to instructed precautions during ADL tasks.


2=Patient will verbalize/demonstrate understanding of assistive devices/

modifications for ADL.


3=Patient will improve strength/tolerance for activity to enable patient to 

perform ADL's.





OT Long Term Goals


Long Term Goals


Time Frame:  Dec 14, 2018


Eating (FIM):  7


Eating (QC):  6


Groomin


Oral Hygiene (QC):  6


Bathing(FIM):  6


Shower/Bathe Self (QC):  6


Upper Body Dressing(FIM):  6


Upper Body Dressing (QC):  6


Lower Body Dressing(FIM):  6


Lower Body Dressing (QC):  6


On/Off Footwear (QC):  6


Toileting(FIM):  6


Toileting Hygiene (QC):  6


Toilet/Commode Transfer(FIM):  6


Toilet/Commode Transfer (QC):  6


Shower Transfer(FIM):  5 (if allowed per wound vac)


Additional Goals:  1-Demonstrate ADL Tasks, 2-Verbalize Understanding, 3-

ImproveStrength/Samina


1=Demonstrate adherence to instructed precautions during ADL tasks.


2=Patient will verbalize/demonstrate understanding of assistive devices/

modifications for ADL.


3=Patient will improve strength/tolerance for activity to enable patient to 

perform ADL's.





OT Education/Plan


Problem List/Assessment


Pt would benefit from skilled OT to increase his independence in basic self 

care to allow him to safely return home after back surgery





Discharge Recommendations


Plan/Recommendations:  Continue POC





Treatment Plan/Plan of Care


Patient would benefit from OT for education, treatment and training to promote 

independence in ADL's, mobility, safety and/or upper extremity function for ADL'

s.


Plan of Care:  ADL Retraining, Functional Mobility, Group Exercise/Act as Ind (

education, exercise, activity tolerance, functional activities, socialization), 

UE Funct Exercise/Act, UE Neuromus Re-Ed/Coord, W/C Management Training


Treatment Duration:  Dec 14, 2018


Frequency:  Modified Program (IRF)


Estimated Hrs Per Day:  1.5 hours per day (1.25 to 1.5)


Agreement:  Yes


Rehab Potential:  Fair





Time/GCodes


Start Time:  08:40


Stop Time:  09:30


Total Time Billed (hr/min):  50


Billed Treatment Time


visit, 45 minutes ADL, 5 minutes exercise  Co-tx with PT











FERNANDO SMITH OT 2018 10:05

## 2018-11-24 NOTE — CARDIOLOGY PROGRESS NOTE
Cardiology SOAP Progress Note


Subjective:


No cardiac complaints.





Objective:


I&O/Vital Signs











 11/24/18 11/24/18





 06:00 09:38


 


Temp 98.1 


 


Pulse 71 82


 


Resp 18 


 


B/P (MAP) 160/73 (102) 153/76 (101)


 


Pulse Ox 93 


 


O2 Delivery Room Air 














 11/24/18





 00:00


 


Intake Total 450 ml


 


Output Total 225 ml


 


Balance 225 ml








Weight (Pounds):  193


Weight (Ounces):  8.0


Weight (Calculated Kilograms):  87.780278


Constitutional:  No appears stated age; AAO x 3; No apparent distress, No PERRL

, No well-developed, No well-nourished, No other


Respiratory:  No accessory muscle use, No respiratory distress, No chest tender

, No chest expansion is symmetric; chest is bilaterally symmetric; No lungs 

clear to percussion; lungs clear to auscultation; No crackles, No rhonchi, No 

rales, No stridor, No wheezing, No pleural rub, No other


Cardiovascular:  regular rate-rhythm; No irregularly irregular, No extra beats, 

No parasternal heave is noted, No JVD, No edema, No bradycardia, No tachycardia

, No point of maximal impulse, No cardiac thrills are palpable; S1 and S2; No 

gallop/S3, No gallop/S4, No diastolic murmur, No systolic murmur, No friction 

rub, No click, No other


Gastrointestional:  No tender, No soft, No round, No distended, No pulsatile 

mass, No organomegaly, No guarding, No rebound, No tenderness, No hernia, No 

mass, No audible bowel sounds, No abnormal bowel sounds, No abdominal bruits, 

No spleenomegaly, No other


Extremities:  No normal range of motion, No non-tender, No normal inspection, 

No pedal edema, No calf tenderness, No normal capillary refill, No pelvis stable

, No calf tenderness, No inflammation, No pedal edema, No slow capillary refill

, No swelling, No other, No abrasion, No clubbing, No cyanosis, No ecchymosis, 

No laceration, No no lower extremity edema bilateral, No significant edema, No 

tenderness, No wound


Neurologic/Psychiatric:  no motor/sensory deficits, alert, normal mood/affect, 

oriented x 3


Skin:  No normal color, No warm/dry, No cyanosis, No cool, No diaphoresis, No 

damp, No ecchymosis, No jaundice, No mottled, No pallor, No rash, No tattoos/

piercings, No ulcerations, No rash on exposed areas, No ulcerations on exposed 

areas, No other





Results/Procedures:


Labs


Laboratory Tests


11/23/18 18:42: Vancomycin Level Trough 20.6H


11/24/18 06:22: Glucometer 119H


11/24/18 11:20: Glucometer 125H








A/P:


Assessment/Dx:


Assessment/Dx:


Spinal abscess,


Sepsis,


UTI,


History of CAD, CABG,


Atrial fibrillation on amiodarone.


LVH,


Diastolic dysfunction.


Plan:





Spinal abscessstatus post I&D. 


Sepsiscontinue broad-spectrum IV antibiotics as per Dr. Pro.  C. difficile, 

on contact precautions.


Paroxysmal atrial fibrillationcurrently in sinus rhythm.  Continue amiodarone.

  I might consider discontinuing amiodarone but since the patient follows Dr. Eckert in [place] I may defer that decision to him.


CAD/history of CABGcontinue lisinopril and statin therapy.  Aspirin low dose 

once okay with Dr. Pro and Dr. Bustos.  Will add low dose beta blocker for 

atrial fibrillation and CAD.


LVH,


Diastolic dysfunction,


Echocardiogram showed normal LV function.


Patient follows with Dr. Eckert in [place].








Thank you for your consultation. Please call me if you have any questions.








KENDALL Zhu MD, FACP, FACC, FSCAI, FHRS, CCDS


Interventional Cardiology


Cardiac Electrophysiology


Vascular Medicine and Endovascular Interventions











MARCIN ZHU MD Nov 24, 2018 11:53 am

## 2018-11-24 NOTE — PM & R (SOAP) PROGRESS NOTE
Subjective


This was a face to face visit with the patient.


Date Seen by Provider:  Nov 24, 2018


Time Seen by Provider:  07:45


Subjective/Events-last exam


Patient was seen in his room this AM Patients spouse spent the night Patient c/

o rt groin pain but non tender to palpation and Patient denies hx of OA of the 

hip will monitor Patient without diarrhea or loose stools but remains with 

contact precautions due to C DIF bowel colitis continues on Vancomycin,

Adjusting well to unit but may require 15/7 therapy schedule due to pain and 

easy fatigue.Patient mod assist for transfers


Review of Systems


Musculoskeletal:  leg pain





Objective


Physician Exam


Last Set of Vital Signs





Vital Signs








  Date Time  Temp Pulse Resp B/P (MAP) Pulse Ox O2 Delivery O2 Flow Rate FiO2


 


11/24/18 06:00 98.1 71 18 160/73 (102) 93 Room Air  





Capillary Refill :


I&O











Intake and Output 


 


 11/24/18





 00:00


 


Intake Total 450 ml


 


Output Total 225 ml


 


Balance 225 ml


 


 


 


Intake Oral 200 ml


 


IV Total 250 ml


 


Output Urine Total 225 ml


 


# Bowel Movements 1


 


Daily Weight Change No








General:  Alert, Cooperative, No Acute Distress


HEENT:  Atraumatic, PERRLA, EOMI, Mucous Memb Moist/Pink, Other ([facility] has 

hearing aids but out for the evening)


Neck:  Supple, No JVD


Lungs:  Clear to Auscultation


Heart:  Regular Rate


Abdomen:  Normal Bowel Sounds, Soft, No Tenderness


Extremities:  No Edema


Skin:  Other (has wound vac over incision)


Neuro:  Other (cognitive impairment with memory strength 4/5 uppers 3+/5 lowers 

sensation intact)


Psych/Mental Status:  Other (Mild memory impairment)





Results


Lab Data


Laboratory Tests


11/23/18 18:42: Vancomycin Level Trough 20.6H


11/24/18 06:22: Glucometer 119H





Assessment/Plan


Assessment and Plan


Spinal abscess s/p Spinal surgery OSH now with wound vac


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


urinary retention Monitor for improvement with Bladder scanning Consult  as 

needed


A FIB controlled with med


CAD s/p CABG


DM controlled with med


Postop anemia s/p transfusion


HLP





Plan Continue PT/OT/Antibiotics/contact precautions


Team Conference 11-28-18


Goal return home with spouse


F/U with DR Bustos and Hospitalist and Cardiologist


Co-Morbidities that are continuing to impact the rehab process: (include details

)











CJ BROCK MD Nov 24, 2018 08:11

## 2018-11-24 NOTE — PHYSICAL THERAPY DAILY NOTE
PT Daily Note-Current


Subjective


Pt reports (R) groin and lumbar pain 7-8/10 with movement and when sitting 

upright.





Pain





   Numeric Pain Scale:  7


   Location:  Right


   Location Body Site:  Thigh


   Pain Description:  Ache, Dull





Mental Status


Patient Orientation:  Person, Place, Time, Situation


Attachments:  Central Line, Other-See Comments


wound vac on lumbar spine





Transfers


              Functional [place] Measure


0=Not Assessed/NA   4=Minimal Assistance


1=Total Assistance   5=Supervision or Setup


2=Maximal Assistance   6=Modified [place]


3=Moderate Assistance   7=Complete IndependenceIRFPAI Quality Coding Scale











6 Independent with activity with or without an assistive device


 


5  Patient requires set up or clean up by helper.  Patient completes activity  

by  themselves


 


4 Supervision or touching assist (CGA). [place] provide cues , steadying assist


 


3 The helper provides less than half the effort to complete the activity


 


2 The helper provides more than half the effort to complete the activity


 


1 Dependent.  The helper does all the effort to complete an activity 


 


7 Patient refused to complete or attempt activity


 


9 The patient did not perform the activity before the current illness or injury


 


88 Not attempted due to Medical conditions or safety concerns








Transfers (B, C, W/C) (FIM):  3


Scooting:  3


Rolling:  3


Roll Left to Right (QC):  3


Supine to/from Sit:  3


Sit to/from Stand:  3


Sit to Lying (QC):  3


Sit to Stand (QC):  3


Chair/Bed-to-Chair Xfer(QC):  3


Bed to/from Chair:  3


Car Transfer (QC):  88


Pt was not able to perform the car transfer due to limited activity tolerance 

and limited ambulation tolerance.





Gait Training


Does the Patient Walk?:  Yes


Distance (FIM):  1=up to 49 ft


Distance:  18ft


Walk 10 feet (QC):  1


Walk 50 ft with 2 Turns(QC):  88


Walk 150 ft (QC):  88


Walking 10ft/uneven surface-QC:  88


Gait Level of Assist:  3


Gait Persons Needed:  2


Gait Assistive Device:  FWW


Unable to tolerate distance greater than 18ft due to (R) groin and lumbar pain.





Wheelchair Training


Does the Pt Use a Wheelchair?:  No





Stair Training


Stairs (FIM):  88


Limited activity tolerance in standing, with pt unable to attempt stairs at 

this time.





Exercises


Supine Ex:  LE Protocol


Supine Reps:  15


Seated Therapy Exercises:  LE Protocol


Seated Reps:  15





Treatments


Performed a co-treat with OT.  Performed supine roll to side, supine to sit 

transfer, and worked on seated balance and posture training while sitting edge 

of bed.  Worked with pt on seated exercises and reaching while seated.  Pt 

bathed with OT, and was able to sit edge of bed with pressure applied to the 

back due to lumbar pain.  Pt worked on reaching and balance while donning 

clothes.  Pt performed a SPT from the bed to the commode, and then the commode 

to the recliner.  He required mod assist for all transfers, and +1 for managing 

the wound vac hardware.  Pt shown seated and reclining exercises for (B) UEs 

and LEs.  He was correctly able to demonstrate all of the exercises 

demonstrated.





Assessment


Current Status:  Good Progress


Pt is very willing to participate, and as the pain dissipates, he should be 

able to transition to more (I) with activity.





PT Short Term Goals


Short Term Goals


Time Frame:  2018


Gait (FIM):  2


Gait Distance Comment:  50'


Gait Level of Assist:  4


Gait Assistive Device:  FWW





PT Long Term Goals


Long Term Goals


PT Long Term Goals Time Frame:  Dec 14, 2018


Transfers (B,C,W/C) (FIM):  5


Sit to Lying (QC):  4


Lying-Sitting on Side/Bed(QC):  4


Sit to Stand (QC):  4


Rollin


Roll Left to Right (QC):  4


Chair/Bed-to-Chair Xfer(QC):  4


Car Transfer (QC):  4


Gait (FIM):  5


Distance:  150'


Walk 10 feet (QC):  4


Walk 10ft-Uneven Surface(QC):  4


Walk 50ft with 2 Turns (QC):  4


Walk 150 ft (QC):  4


Gait Level of Assist:  5


Gait Assistive Device:  FWW


Stairs (FIM):  2


# of Steps:  4


1 Step (curb) (QC):  4


4 Steps (QC):  4


Stairs Level Of Assist:  4





PT Plan


Treatment/Plan


Treatment Plan:  Continue Plan of Care


Treatment Plan:  Bed Mobility, Concurrent Therapy, Education, Functional 

Activity Samina, Functional Strength, Group Therapy, Gait, Safety, Therapeutic 

Exercise, Transfers


Treatment Duration:  Dec 14, 2018


Frequency:  Modified Program (IRF)


Estimated Hrs Per Day:  1.5 hours per day


Patient and/or Family Agrees t:  Yes





Time/GCodes


Time In:  0840


Time Out:  930


Total Billed Treatment Time:  50


Total Billed Treatment


1, jackelyn (50)











JERALD RINCON PT 2018 09:41

## 2018-11-24 NOTE — PROGRESS NOTE-HOSPITALIST
Subjective


HPI/CC On Admission


Date Seen by Provider:  Nov 24, 2018


Time Seen by Provider:  12:00


Subjective/Events-last exam


Patient doing well


Lovenox was approved by Dr. Bustos since so far out from the spinal surgery


No diarrhea and actually constipated so will discontinue Questran and start on 

gentle dose of MiraLAX and low-dose senna


Pain is well-controlled but taking pain medication regularly


He reports fatigue and that is likely due to recent critical illness in 

addition to regular use of narcotics so I counseled him to use the minimal 

amount





Review of Systems


General:  Fatigue


Gastrointestinal:  Constipation


Musculoskeletal:  back pain





Objective


Exam


Vital Signs





Vital Signs








  Date Time  Temp Pulse Resp B/P (MAP) Pulse Ox O2 Delivery O2 Flow Rate FiO2


 


11/24/18 09:38  82  153/76 (101)    


 


11/24/18 06:00 98.1  18  93 Room Air  





Capillary Refill :


General Appearance:  No Apparent Distress, WD/WN, Chronically ill


Respiratory:  Chest Non Tender, Lungs Clear, Normal Breath Sounds, No Accessory 

Muscle Use, No Respiratory Distress


Cardiovascular:  Regular Rate, Rhythm, No Edema, No Gallop, No JVD, No Murmur, 

Normal Peripheral Pulses


Neurologic/Psychiatric:  Alert, Oriented x3, No Motor/Sensory Deficits, Normal 

Mood/Affect





Results/Procedures


Lab


Patient resulted labs reviewed.





Assessment/Plan


Assessment and Plan


Assess & Plan/Chief Complaint


Assessment:


Debility following spinal abscess status post 2 I&D's uncomplicated per Dr Bustos


C. difficile colitis now appears to be resolved but maintain on face and DC 

Questran and start MiraLAX


Severe anemia due to acute blood loss status post 4 units of blood while on 

MedSurg and ICU


CAD


DM


CRI





Plan:


Limit pain meds


Miralax and senna


Hold Questran


Vanc PO





Diagnosis/Problems


Diagnosis/Problems





(1) Intraspinal abscess and granuloma


Status:  Resolved


Resolution Date/Time:  11/24/18 @ 14:02


(2) C. difficile colitis


Status:  Acute


(3) Renal insufficiency


Status:  Resolved


Resolution Date/Time:  11/19/18 @ 09:41


(4) Leukocytosis


Status:  Resolved


Resolution Date/Time:  11/24/18 @ 14:02


(5) Transfusion of blood during current hospitalization


Status:  Resolved


Resolution Date/Time:  11/24/18 @ 14:02


(6) Advanced age


Status:  Chronic


(7) CAD (coronary artery disease)


Status:  Chronic


Qualifiers:  


   Coronary Disease-Associated Artery/Lesion type:  native artery  Native vs. 

transplanted heart:  native heart  Associated angina:  without angina  

Qualified Codes:  I25.10 - Atherosclerotic heart disease of native coronary 

artery without angina pectoris


(8) Atrial fibrillation


Status:  Chronic


Qualifiers:  


   Atrial fibrillation type:  paroxysmal  Qualified Codes:  I48.0 - Paroxysmal 

atrial fibrillation


(9) Hx of CABG


Status:  Chronic





Clinical Quality Measures


DVT/VTE Risk/Contraindication:


RFS Level Per Nursing on Admit:  4+=Very High











LISA QUIÑONES DO Nov 24, 2018 13:05

## 2018-11-25 VITALS — DIASTOLIC BLOOD PRESSURE: 75 MMHG | SYSTOLIC BLOOD PRESSURE: 155 MMHG

## 2018-11-25 VITALS — DIASTOLIC BLOOD PRESSURE: 71 MMHG | SYSTOLIC BLOOD PRESSURE: 168 MMHG

## 2018-11-25 RX ADMIN — INSULIN ASPART SCH UNIT: 100 INJECTION, SOLUTION INTRAVENOUS; SUBCUTANEOUS at 16:20

## 2018-11-25 RX ADMIN — PHENAZOPYRIDINE HYDROCHLORIDE SCH MG: 100 TABLET ORAL at 12:56

## 2018-11-25 RX ADMIN — PIOGLITAZONE SCH MG: 30 TABLET ORAL at 06:58

## 2018-11-25 RX ADMIN — OXYBUTYNIN CHLORIDE SCH MG: 5 TABLET ORAL at 09:19

## 2018-11-25 RX ADMIN — DOCUSATE SODIUM AND SENNOSIDES SCH EA: 8.6; 5 TABLET, FILM COATED ORAL at 09:21

## 2018-11-25 RX ADMIN — METOPROLOL TARTRATE SCH MG: 25 TABLET, FILM COATED ORAL at 20:25

## 2018-11-25 RX ADMIN — METFORMIN HYDROCHLORIDE SCH MG: 500 TABLET, FILM COATED ORAL at 17:04

## 2018-11-25 RX ADMIN — COMPOUNDING SYRUP VEHICLE SCH ML: 1 SYRUP at 11:35

## 2018-11-25 RX ADMIN — DOCUSATE SODIUM AND SENNOSIDES SCH EA: 8.6; 5 TABLET, FILM COATED ORAL at 20:25

## 2018-11-25 RX ADMIN — PHENAZOPYRIDINE HYDROCHLORIDE SCH MG: 100 TABLET ORAL at 18:38

## 2018-11-25 RX ADMIN — BETHANECHOL CHLORIDE SCH MG: 10 TABLET ORAL at 16:21

## 2018-11-25 RX ADMIN — LACTOBACILLUS ACIDOPHILUS / LACTOBACILLUS BULGARICUS SCH GM: 100 MILLION CFU STRENGTH GRANULES at 11:32

## 2018-11-25 RX ADMIN — LISINOPRIL SCH MG: 20 TABLET ORAL at 09:19

## 2018-11-25 RX ADMIN — METFORMIN HYDROCHLORIDE SCH MG: 500 TABLET, FILM COATED ORAL at 06:58

## 2018-11-25 RX ADMIN — COMPOUNDING SYRUP VEHICLE SCH ML: 1 SYRUP at 18:39

## 2018-11-25 RX ADMIN — COMPOUNDING SYRUP VEHICLE SCH ML: 1 SYRUP at 06:00

## 2018-11-25 RX ADMIN — ACETAMINOPHEN SCH MG: 325 TABLET ORAL at 11:34

## 2018-11-25 RX ADMIN — LACTOBACILLUS ACIDOPHILUS / LACTOBACILLUS BULGARICUS SCH GM: 100 MILLION CFU STRENGTH GRANULES at 06:10

## 2018-11-25 RX ADMIN — ACETAMINOPHEN SCH MG: 325 TABLET ORAL at 18:38

## 2018-11-25 RX ADMIN — METOPROLOL TARTRATE SCH MG: 25 TABLET, FILM COATED ORAL at 09:19

## 2018-11-25 RX ADMIN — LACTOBACILLUS ACIDOPHILUS / LACTOBACILLUS BULGARICUS SCH GM: 100 MILLION CFU STRENGTH GRANULES at 16:22

## 2018-11-25 RX ADMIN — SODIUM CHLORIDE SCH MLS/HR: 900 INJECTION, SOLUTION INTRAVENOUS at 20:26

## 2018-11-25 RX ADMIN — AMLODIPINE BESYLATE SCH MG: 5 TABLET ORAL at 09:19

## 2018-11-25 RX ADMIN — ALLOPURINOL SCH MG: 300 TABLET ORAL at 09:19

## 2018-11-25 RX ADMIN — BETHANECHOL CHLORIDE SCH MG: 10 TABLET ORAL at 20:25

## 2018-11-25 RX ADMIN — AMIODARONE HYDROCHLORIDE SCH MG: 200 TABLET ORAL at 09:19

## 2018-11-25 RX ADMIN — BETHANECHOL CHLORIDE SCH MG: 10 TABLET ORAL at 12:56

## 2018-11-25 RX ADMIN — TAMSULOSIN HYDROCHLORIDE SCH MG: 0.4 CAPSULE ORAL at 18:38

## 2018-11-25 RX ADMIN — INSULIN ASPART SCH UNIT: 100 INJECTION, SOLUTION INTRAVENOUS; SUBCUTANEOUS at 11:32

## 2018-11-25 RX ADMIN — POLYETHYLENE GLYCOL (3350) SCH GM: 17 POWDER, FOR SOLUTION ORAL at 20:32

## 2018-11-25 RX ADMIN — INSULIN ASPART SCH UNIT: 100 INJECTION, SOLUTION INTRAVENOUS; SUBCUTANEOUS at 06:00

## 2018-11-25 RX ADMIN — INSULIN ASPART SCH UNIT: 100 INJECTION, SOLUTION INTRAVENOUS; SUBCUTANEOUS at 20:24

## 2018-11-25 RX ADMIN — ATORVASTATIN CALCIUM SCH MG: 10 TABLET, FILM COATED ORAL at 09:20

## 2018-11-25 RX ADMIN — POLYETHYLENE GLYCOL (3350) SCH GM: 17 POWDER, FOR SOLUTION ORAL at 09:22

## 2018-11-25 RX ADMIN — LACTOBACILLUS ACIDOPHILUS / LACTOBACILLUS BULGARICUS SCH GM: 100 MILLION CFU STRENGTH GRANULES at 20:25

## 2018-11-25 RX ADMIN — ACETAMINOPHEN SCH MG: 325 TABLET ORAL at 06:10

## 2018-11-25 NOTE — PROGRESS NOTE-HOSPITALIST
Subjective


HPI/CC On Admission


Date Seen by Provider:  Nov 25, 2018


Time Seen by Provider:  12:15


Subjective/Events-last exam


Urinary retention noted


Urecholine and Pyridium and Flomax will be started and panda cath will be 

placed if retention continues


He did have a panda in the ICU


Diarrhea resolved then constipation but now the bowels are moving today with 

Miralax


C diff colitis treatment tolerated well


Pain is controlled


Constantly wants to go home.


Checked meds and labs





Review of Systems


Gastrointestinal:  Constipation


Genitourinary:  Retention





Objective


Exam


Vital Signs





Vital Signs








  Date Time  Temp Pulse Resp B/P (MAP) Pulse Ox O2 Delivery O2 Flow Rate FiO2


 


11/25/18 09:00      Room Air  


 


11/25/18 05:30 97.8 74 20 155/75 (101) 94   





Capillary Refill :


General Appearance:  No Apparent Distress, WD/WN


Respiratory:  Chest Non Tender, Lungs Clear, Normal Breath Sounds, No Accessory 

Muscle Use, No Respiratory Distress


Cardiovascular:  Regular Rate, Rhythm, No Edema, No Gallop, No JVD, No Murmur, 

Normal Peripheral Pulses


Gastrointestinal:  Normal Bowel Sounds, No Organomegaly, No Pulsatile Mass, Non 

Tender, Soft


Neurologic/Psychiatric:  Alert, Oriented x3, No Motor/Sensory Deficits, Normal 

Mood/Affect





Results/Procedures


Lab


Patient resulted labs reviewed.





Assessment/Plan


Assessment and Plan


Assess & Plan/Chief Complaint


Assessment:


Debility following spinal abscess status post 2 I&D's uncomplicated per Dr Bustos


Urinary retention acute


C. difficile colitis improved after constipation for 2 days now BM today after 

Miralx and DC Questran


Severe anemia due to acute blood loss status post 4 units of blood while on 

MedSurg and ICU


CAD


DM


CRI





Plan:


Panda cath prn


Urecholine and Pyridium


Limit pain meds


Miralax and senna


Hold Questran


Vanc PO





Diagnosis/Problems


Diagnosis/Problems





(1) Intraspinal abscess and granuloma


Status:  Resolved


Resolution Date/Time:  11/24/18 @ 14:02


(2) C. difficile colitis


Status:  Acute


(3) Renal insufficiency


Status:  Resolved


Resolution Date/Time:  11/19/18 @ 09:41


(4) Leukocytosis


Status:  Resolved


Resolution Date/Time:  11/24/18 @ 14:02


(5) Transfusion of blood during current hospitalization


Status:  Resolved


Resolution Date/Time:  11/24/18 @ 14:02


(6) Advanced age


Status:  Chronic


(7) CAD (coronary artery disease)


Status:  Chronic


Qualifiers:  


   Coronary Disease-Associated Artery/Lesion type:  native artery  Native vs. 

transplanted heart:  native heart  Associated angina:  without angina  

Qualified Codes:  I25.10 - Atherosclerotic heart disease of native coronary 

artery without angina pectoris


(8) Atrial fibrillation


Status:  Chronic


Qualifiers:  


   Atrial fibrillation type:  paroxysmal  Qualified Codes:  I48.0 - Paroxysmal 

atrial fibrillation


(9) Hx of CABG


Status:  Chronic


(10) Urinary retention


Status:  Acute





Clinical Quality Measures


DVT/VTE Risk/Contraindication:


RFS Level Per Nursing on Admit:  4+=Very High











LISA QUIÑONES DO Nov 25, 2018 12:25

## 2018-11-25 NOTE — CARDIOLOGY PROGRESS NOTE
Cardiology SOAP Progress Note


Subjective:


No cardiac complaints.





Objective:


I&O/Vital Signs











 11/25/18





 09:00


 


O2 Delivery Room Air














 11/25/18





 00:00


 


Intake Total 1030 ml


 


Output Total 325 ml


 


Balance 705 ml








Weight (Pounds):  193


Weight (Ounces):  8.0


Weight (Calculated Kilograms):  87.655010


Constitutional:  No appears stated age; AAO x 3; No apparent distress, No PERRL

, No well-developed, No well-nourished, No other


Respiratory:  No accessory muscle use, No respiratory distress, No chest tender

, No chest expansion is symmetric; chest is bilaterally symmetric; No lungs 

clear to percussion; lungs clear to auscultation; No crackles, No rhonchi, No 

rales, No stridor, No wheezing, No pleural rub, No other


Cardiovascular:  regular rate-rhythm; No irregularly irregular, No extra beats, 

No parasternal heave is noted, No JVD, No edema, No bradycardia, No tachycardia

, No point of maximal impulse, No cardiac thrills are palpable; S1 and S2; No 

gallop/S3, No gallop/S4, No diastolic murmur, No systolic murmur, No friction 

rub, No click, No other


Gastrointestional:  No tender, No soft, No round, No distended, No pulsatile 

mass, No organomegaly, No guarding, No rebound, No tenderness, No hernia, No 

mass, No audible bowel sounds, No abnormal bowel sounds, No abdominal bruits, 

No spleenomegaly, No other


Extremities:  No normal range of motion, No non-tender, No normal inspection, 

No pedal edema, No calf tenderness, No normal capillary refill, No pelvis stable

, No calf tenderness, No inflammation, No pedal edema, No slow capillary refill

, No swelling, No other, No abrasion, No clubbing, No cyanosis, No ecchymosis, 

No laceration, No no lower extremity edema bilateral, No significant edema, No 

tenderness, No wound


Neurologic/Psychiatric:  no motor/sensory deficits, alert, normal mood/affect, 

oriented x 3


Skin:  No normal color, No warm/dry, No cyanosis, No cool, No diaphoresis, No 

damp, No ecchymosis, No jaundice, No mottled, No pallor, No rash, No tattoos/

piercings, No ulcerations, No rash on exposed areas, No ulcerations on exposed 

areas, No other





Results/Procedures:


Labs


Laboratory Tests


11/25/18 06:09: Glucometer 87


11/25/18 11:31: Glucometer 148H


11/25/18 16:19: Glucometer 159H








A/P:


Assessment/Dx:


Assessment/Dx:


Spinal abscess,


Sepsis,


UTI,


History of CAD, CABG,


Atrial fibrillation on amiodarone.


LVH,


Diastolic dysfunction.


Plan:





Spinal abscessstatus post I&D. 


Sepsiscontinue broad-spectrum IV antibiotics as per Dr. Pro.  C. difficile, 

on contact precautions.


Paroxysmal atrial fibrillationcurrently in sinus rhythm.  Continue amiodarone.

  I might consider discontinuing amiodarone but since the patient follows Dr. Eckert in [place] I may defer that decision to him.


CAD/history of CABGcontinue lisinopril and statin therapy.  Aspirin low dose 

once okay with Dr. Pro and Dr. Bustos.  Will add low dose beta blocker for 

atrial fibrillation and CAD.


LVH,


Diastolic dysfunction,


Echocardiogram showed normal LV function.


Patient follows with Dr. Eckert in [place].








Thank you for your consultation. Please call me if you have any questions.








KENDALL Zhu MD, FACP, FACC, FSCAI, FHRS, CCDS


Interventional Cardiology


Cardiac Electrophysiology


Vascular Medicine and Endovascular Interventions











MARCIN ZHU MD Nov 25, 2018 1:17 pm

## 2018-11-26 VITALS — SYSTOLIC BLOOD PRESSURE: 137 MMHG | DIASTOLIC BLOOD PRESSURE: 78 MMHG

## 2018-11-26 VITALS — SYSTOLIC BLOOD PRESSURE: 124 MMHG | DIASTOLIC BLOOD PRESSURE: 71 MMHG

## 2018-11-26 LAB
ALBUMIN SERPL-MCNC: 2.4 GM/DL (ref 3.2–4.5)
ALP SERPL-CCNC: 189 U/L (ref 40–136)
ALT SERPL-CCNC: 45 U/L (ref 0–55)
BASOPHILS # BLD AUTO: 0 10^3/UL (ref 0–0.1)
BASOPHILS NFR BLD AUTO: 0 % (ref 0–10)
BILIRUB SERPL-MCNC: 0.4 MG/DL (ref 0.1–1)
BUN/CREAT SERPL: 13
CALCIUM SERPL-MCNC: 8.9 MG/DL (ref 8.5–10.1)
CHLORIDE SERPL-SCNC: 106 MMOL/L (ref 98–107)
CO2 SERPL-SCNC: 20 MMOL/L (ref 21–32)
CREAT SERPL-MCNC: 0.85 MG/DL (ref 0.6–1.3)
EOSINOPHIL # BLD AUTO: 0.2 10^3/UL (ref 0–0.3)
EOSINOPHIL NFR BLD AUTO: 1 % (ref 0–10)
ERYTHROCYTE [DISTWIDTH] IN BLOOD BY AUTOMATED COUNT: 17 % (ref 10–14.5)
GFR SERPLBLD BASED ON 1.73 SQ M-ARVRAT: > 60 ML/MIN
GLUCOSE SERPL-MCNC: 135 MG/DL (ref 70–105)
HCT VFR BLD CALC: 28 % (ref 40–54)
HGB BLD-MCNC: 9.5 G/DL (ref 13.3–17.7)
LYMPHOCYTES # BLD AUTO: 2 X 10^3 (ref 1–4)
LYMPHOCYTES NFR BLD AUTO: 15 % (ref 12–44)
MANUAL DIFFERENTIAL PERFORMED BLD QL: NO
MCH RBC QN AUTO: 30 PG (ref 25–34)
MCHC RBC AUTO-ENTMCNC: 34 G/DL (ref 32–36)
MCV RBC AUTO: 89 FL (ref 80–99)
MONOCYTES # BLD AUTO: 1.5 X 10^3 (ref 0–1)
MONOCYTES NFR BLD AUTO: 11 % (ref 0–12)
NEUTROPHILS # BLD AUTO: 9.7 X 10^3 (ref 1.8–7.8)
NEUTROPHILS NFR BLD AUTO: 73 % (ref 42–75)
PLATELET # BLD: 447 10^3/UL (ref 130–400)
PMV BLD AUTO: 9.7 FL (ref 7.4–10.4)
POTASSIUM SERPL-SCNC: 4 MMOL/L (ref 3.6–5)
PROT SERPL-MCNC: 4.7 GM/DL (ref 6.4–8.2)
RBC # BLD AUTO: 3.17 10^6/UL (ref 4.35–5.85)
SODIUM SERPL-SCNC: 135 MMOL/L (ref 135–145)
WBC # BLD AUTO: 13.3 10^3/UL (ref 4.3–11)

## 2018-11-26 RX ADMIN — COMPOUNDING SYRUP VEHICLE SCH ML: 1 SYRUP at 06:43

## 2018-11-26 RX ADMIN — PHENAZOPYRIDINE HYDROCHLORIDE SCH MG: 100 TABLET ORAL at 17:47

## 2018-11-26 RX ADMIN — TAMSULOSIN HYDROCHLORIDE SCH MG: 0.4 CAPSULE ORAL at 17:47

## 2018-11-26 RX ADMIN — LACTOBACILLUS ACIDOPHILUS / LACTOBACILLUS BULGARICUS SCH GM: 100 MILLION CFU STRENGTH GRANULES at 20:37

## 2018-11-26 RX ADMIN — LACTOBACILLUS ACIDOPHILUS / LACTOBACILLUS BULGARICUS SCH GM: 100 MILLION CFU STRENGTH GRANULES at 16:13

## 2018-11-26 RX ADMIN — ACETAMINOPHEN SCH MG: 325 TABLET ORAL at 06:43

## 2018-11-26 RX ADMIN — COMPOUNDING SYRUP VEHICLE SCH ML: 1 SYRUP at 17:49

## 2018-11-26 RX ADMIN — INSULIN ASPART SCH UNIT: 100 INJECTION, SOLUTION INTRAVENOUS; SUBCUTANEOUS at 16:13

## 2018-11-26 RX ADMIN — BETHANECHOL CHLORIDE SCH MG: 10 TABLET ORAL at 16:14

## 2018-11-26 RX ADMIN — COMPOUNDING SYRUP VEHICLE SCH ML: 1 SYRUP at 00:28

## 2018-11-26 RX ADMIN — DOCUSATE SODIUM AND SENNOSIDES SCH EA: 8.6; 5 TABLET, FILM COATED ORAL at 08:16

## 2018-11-26 RX ADMIN — POLYETHYLENE GLYCOL (3350) SCH GM: 17 POWDER, FOR SOLUTION ORAL at 08:16

## 2018-11-26 RX ADMIN — COMPOUNDING SYRUP VEHICLE SCH ML: 1 SYRUP at 12:55

## 2018-11-26 RX ADMIN — ACETAMINOPHEN SCH MG: 325 TABLET ORAL at 00:28

## 2018-11-26 RX ADMIN — ACETAMINOPHEN SCH MG: 325 TABLET ORAL at 12:54

## 2018-11-26 RX ADMIN — ATORVASTATIN CALCIUM SCH MG: 10 TABLET, FILM COATED ORAL at 08:13

## 2018-11-26 RX ADMIN — METOPROLOL TARTRATE SCH MG: 25 TABLET, FILM COATED ORAL at 20:37

## 2018-11-26 RX ADMIN — ALLOPURINOL SCH MG: 300 TABLET ORAL at 08:12

## 2018-11-26 RX ADMIN — PIOGLITAZONE SCH MG: 30 TABLET ORAL at 06:43

## 2018-11-26 RX ADMIN — PHENAZOPYRIDINE HYDROCHLORIDE SCH MG: 100 TABLET ORAL at 08:12

## 2018-11-26 RX ADMIN — LISINOPRIL SCH MG: 20 TABLET ORAL at 08:13

## 2018-11-26 RX ADMIN — INSULIN ASPART SCH UNIT: 100 INJECTION, SOLUTION INTRAVENOUS; SUBCUTANEOUS at 11:07

## 2018-11-26 RX ADMIN — PHENAZOPYRIDINE HYDROCHLORIDE SCH MG: 100 TABLET ORAL at 12:54

## 2018-11-26 RX ADMIN — BETHANECHOL CHLORIDE SCH MG: 10 TABLET ORAL at 11:07

## 2018-11-26 RX ADMIN — LACTOBACILLUS ACIDOPHILUS / LACTOBACILLUS BULGARICUS SCH GM: 100 MILLION CFU STRENGTH GRANULES at 06:43

## 2018-11-26 RX ADMIN — METOPROLOL TARTRATE SCH MG: 25 TABLET, FILM COATED ORAL at 08:13

## 2018-11-26 RX ADMIN — INSULIN ASPART SCH UNIT: 100 INJECTION, SOLUTION INTRAVENOUS; SUBCUTANEOUS at 06:43

## 2018-11-26 RX ADMIN — SODIUM CHLORIDE SCH MLS/HR: 900 INJECTION, SOLUTION INTRAVENOUS at 20:37

## 2018-11-26 RX ADMIN — LACTOBACILLUS ACIDOPHILUS / LACTOBACILLUS BULGARICUS SCH GM: 100 MILLION CFU STRENGTH GRANULES at 11:07

## 2018-11-26 RX ADMIN — INSULIN ASPART SCH UNIT: 100 INJECTION, SOLUTION INTRAVENOUS; SUBCUTANEOUS at 20:37

## 2018-11-26 RX ADMIN — AMLODIPINE BESYLATE SCH MG: 5 TABLET ORAL at 08:13

## 2018-11-26 RX ADMIN — BETHANECHOL CHLORIDE SCH MG: 10 TABLET ORAL at 20:37

## 2018-11-26 RX ADMIN — BETHANECHOL CHLORIDE SCH MG: 10 TABLET ORAL at 06:43

## 2018-11-26 RX ADMIN — ACETAMINOPHEN SCH MG: 325 TABLET ORAL at 17:47

## 2018-11-26 RX ADMIN — POLYETHYLENE GLYCOL (3350) SCH GM: 17 POWDER, FOR SOLUTION ORAL at 20:29

## 2018-11-26 RX ADMIN — METFORMIN HYDROCHLORIDE SCH MG: 500 TABLET, FILM COATED ORAL at 06:43

## 2018-11-26 RX ADMIN — DOCUSATE SODIUM AND SENNOSIDES SCH EA: 8.6; 5 TABLET, FILM COATED ORAL at 20:29

## 2018-11-26 RX ADMIN — METFORMIN HYDROCHLORIDE SCH MG: 500 TABLET, FILM COATED ORAL at 17:47

## 2018-11-26 RX ADMIN — AMIODARONE HYDROCHLORIDE SCH MG: 200 TABLET ORAL at 08:13

## 2018-11-26 NOTE — PM & R (SOAP) PROGRESS NOTE
Subjective


This was a face to face visit with the patient.


Date Seen by Provider:  Nov 26, 2018


Time Seen by Provider:  14:50


Subjective/Events-last exam


Patient was seen in his room this afternoon Discussed case with RN Appreciate 

Hospitalist and Cardiologists notes and orders Patient with persistent urinary 

retention DR Tawil, consulted patient min assist for transfers RN clarifying 

need for TLSO to be on when up as interferes somewhat with Wound vac.


Date Identified:  Nov 26, 2018


Time Identified:  15:00


Medication Intervention:  


Meds adjusted for Urinary retention


Review of Systems


Genitourinary:  Retention


Musculoskeletal:  back pain


Neurological:  Weakness





Objective


Physician Exam


Last Set of Vital Signs





Vital Signs








  Date Time  Temp Pulse Resp B/P (MAP) Pulse Ox O2 Delivery O2 Flow Rate FiO2


 


11/26/18 09:00      Room Air  


 


11/26/18 05:23 98.7 72 18 124/71 (88) 96   





Capillary Refill :


I&O











Intake and Output 


 


 11/26/18





 00:00


 


Intake Total 1490 ml


 


Output Total 1150 ml


 


Balance 340 ml


 


 


 


Intake Oral 1240 ml


 


IV Total 250 ml


 


Output Urine Total 1150 ml


 


Bladder Scan Volume Amount 323 ml





 380 ml





 357 ml


 


# Voids 1


 


# Bowel Movements 1








General:  Alert, Cooperative, No Acute Distress


HEENT:  Atraumatic, PERRLA, EOMI, Mucous Memb Moist/Pink, Other ([facility] has 

hearing aids but out for the evening)


Neck:  Supple, No JVD


Lungs:  Clear to Auscultation


Heart:  Regular Rate


Abdomen:  Normal Bowel Sounds, Soft, No Tenderness


Extremities:  No Edema


Skin:  Other (has wound vac over incision)


Neuro:  Other (cognitive impairment with memory strength 4/5 uppers 3+/5 lowers 

sensation intact)


Psych/Mental Status:  Other (Mild memory impairment)





Results


Lab Data


Laboratory Tests


11/23/18 18:42: Vancomycin Level Trough 20.6H


11/24/18 06:22: Glucometer 119H


11/24/18 11:20: Glucometer 125H


11/25/18 06:09: Glucometer 87


11/25/18 11:31: Glucometer 148H


11/25/18 16:19: Glucometer 159H


11/25/18 20:23: Glucometer 187H


11/26/18 06:39: Glucometer 135H


11/26/18 06:45: 


White Blood Count 13.3H, Red Blood Count 3.17L, Hemoglobin 9.5L, Hematocrit 28L

, Mean Corpuscular Volume 89, Mean Corpuscular Hemoglobin 30, Mean Corpuscular 

Hemoglobin Concent 34, Red Cell Distribution Width 17.0H, Platelet Count 447H, 

Mean Platelet Volume 9.7, Neutrophils (%) (Auto) 73, Lymphocytes (%) (Auto) 15, 

Monocytes (%) (Auto) 11, Eosinophils (%) (Auto) 1, Basophils (%) (Auto) 0, 

Neutrophils # (Auto) 9.7H, Lymphocytes # (Auto) 2.0, Monocytes # (Auto) 1.5H, 

Eosinophils # (Auto) 0.2, Basophils # (Auto) 0.0, Sodium Level 135, Potassium 

Level 4.0, Chloride Level 106, Carbon Dioxide Level 20L, Anion Gap 9, Blood 

Urea Nitrogen 11, Creatinine 0.85, Estimat Glomerular Filtration Rate > 60, BUN/

Creatinine Ratio 13, Glucose Level 135H, Calcium Level 8.9, Corrected Calcium 

10.2H, Total Bilirubin 0.4, Aspartate Amino Transf (AST/SGOT) 49H, Alanine 

Aminotransferase (ALT/SGPT) 45, Alkaline Phosphatase 189H, Total Protein 4.7L, 

Albumin 2.4L


11/26/18 11:06: Glucometer 142H





Assessment/Plan


Assessment and Plan


Spinal abscess with sepsis s/p spinal surgery off antibiotics and on Wound vac


Urinary retention postop  to see


CD Bowel infection on Vanco and contact precautions


A FIB controlled with med


CAD s/p CABG


DM controlled with med


Postop anemia s/p Transfusion


HLP


Plan Continue PT/OT on 15/7 schedule for therapies 


F/U with DR Bustos re concerns with Wound vac and TLSO


F/U with  re urinary retention


Team Conference 11-28-18


Co-Morbidities that are continuing to impact the rehab process: (include details

)











CJ BROCK MD Nov 26, 2018 16:22

## 2018-11-26 NOTE — OCCUPATIONAL THER DAILY NOTE
OT Current Status-Daily Note


Subjective


Pt alert, sitting in recliner.  Pt agrees to therapy.  Pt c/o pain 8/10, 

reported to nrsg.





Mental Status/Objective


              Functional [place] Measure


0=Not Assessed/NA   4=Minimal Assistance


1=Total Assistance   5=Supervision or Setup


2=Maximal Assistance   6=Modified [place]


3=Moderate Assistance   7=Complete [place]


Attachments:  Luis Catheter, IV





ADL-Treatment


Co-treat with PT for skilled techniques/care due to increased pain, significant 

fatigue and decreased activity tolerance.  PT worked on transfers and 

ambulation.  OT worked on UE strengthening, lower body dressing and 

handwashing.  Pt required multiple recovery breaks throughout each task.  Max A 

with lower body dressing.  Pt attempted to hike pants over hips though could 

not complete on own.  Assist to don over feet and pull up thighs.  Pt stood at 

sink, leaning on counter, to wash hands required assist to get soap.  Pt c/o 

inability to stand for long due to back pain.  Pt instructed to use UE/LE to 

propel w/c to and from therapy gym.


              Functional [place] Measure


0=Not Assessed/NA   4=Minimal Assistance


1=Total Assistance   5=Supervision or Setup


2=Maximal Assistance   6=Modified [place]


3=Moderate Assistance   7=Complete IndependenceIRFPAI Quality Coding Scale











6 Independent with activity with or without an assistive device


 


5  Patient requires set up or clean up by helper.  Patient completes activity  

by  themselves


 


4 Supervision or touching assist (CGA). [place] provide cues , steadying assist


 


3 The helper provides less than half the effort to complete the activity


 


2 The helper provides more than half the effort to complete the activity


 


1 Dependent.  The helper does all the effort to complete an activity 


 


7 Patient refused to complete or attempt activity


 


9 The patient did not perform the activity before the current illness or injury


 


88 Not attempted due to Medical conditions or safety concerns








Lower Body Dressing (FIM):  2


Lower Body Dressing (QC):  2





Other Treatment


Pt required multiple recovery breaks when propelling w/c to and from gym, c/o 

fatigue and back/groin pain.  UE strengthening with ambulation while using 

parallel bars and attempted to complete UE exercises.  Pt became nauseated and 

vomited, reported to nrsg.  Attempted to have pt ambulate a few feet to bed, pt 

unable to push to feet.  PT completed SPT to bed then pt able to lay down in 

bed from EOB.  After therapy, wife in room, pt lying in bed with call light/

phone in reach.  All needs met in room.





OT Short Term Goals


Short Term Goals


Time Frame:  2018


Eating(FIM):  5


Grooming(FIM):  5


Bathing(FIM):  3


Upper Body Dressing(FIM):  4


Lower Body Dressing(FIM):  3


Toileting(FIM):  3


Toilet/Commode Transfer(FIM):  4


Additional Short Term Goals:  1-Demonstrate ADL Tasks, 2-Verbalize Understanding

, 3-ImproveStrength/Sam[place]


1=Demonstrate adherence to instructed precautions during ADL tasks.


2=Patient will verbalize/demonstrate understanding of assistive devices/

modifications for ADL.


3=Patient will improve strength/tolerance for activity to enable patient to 

perform ADL's.





OT Long Term Goals


Long Term Goals


Time Frame:  Dec 14, 2018


Eating (FIM):  7


Eating (QC):  6


Groomin


Oral Hygiene (QC):  6


Bathing(FIM):  6


Shower/Bathe Self (QC):  6


Upper Body Dressing(FIM):  6


Upper Body Dressing (QC):  6


Lower Body Dressing(FIM):  6


Lower Body Dressing (QC):  6


On/Off Footwear (QC):  6


Toileting(FIM):  6


Toileting Hygiene (QC):  6


Toilet/Commode Transfer(FIM):  6


Toilet/Commode Transfer (QC):  6


Shower Transfer(FIM):  5 (if allowed per wound vac)


Additional Goals:  1-Demonstrate ADL Tasks, 2-Verbalize Understanding, 3-

ImproveStrength/Samina


1=Demonstrate adherence to instructed precautions during ADL tasks.


2=Patient will verbalize/demonstrate understanding of assistive devices/

modifications for ADL.


3=Patient will improve strength/tolerance for activity to enable patient to 

perform ADL's.





OT Education/Plan


Problem List/Assessment


Pt would benefit from skilled OT to increase his independence in basic self 

care to allow him to safely return home after back surgery





Discharge Recommendations


Plan/Recommendations:  Continue POC





Treatment Plan/Plan of Care


Patient would benefit from OT for education, treatment and training to promote 

independence in ADL's, mobility, safety and/or upper extremity function for ADL'

s.


Plan of Care:  ADL Retraining, Functional Mobility, Group Exercise/Act as Ind (

education, exercise, activity tolerance, functional activities, socialization), 

UE Funct Exercise/Act, UE Neuromus Re-Ed/Coord, W/C Management Training


Treatment Duration:  Dec 14, 2018


Frequency:  Modified Program (IRF)


Estimated Hrs Per Day:  1.5 hours per day (1.25 to 1.5)


Agreement:  Yes


Rehab Potential:  Fair





Time/GCodes


Start Time:  08:10


Stop Time:  09:00


Total Time Billed (hr/min):  50


Billed Treatment Time


1 visit-ADL 1 (15 min)  FA 2 (35 min)











QUINTEN JAIME 2018 09:05

## 2018-11-26 NOTE — INDIVIDUALIZED PLAN OF CARE
Individualized Plan of Care


Rehab Nursing IPOC Order


Admission Date


Nov 23, 2018 at 13:25


Current Orders





Orders


Admission Arrival Bed Request (11/23/18 13:25)


Admission Order(Inpt,Obs,Sdc) (11/23/18 13:25)


Initiate Admission Nursing Pro .admission (11/23/18 13:25)


Isolation Central Supply Req (11/23/18 13:25)


Ambulate 08,12,20 (11/23/18 14:05)


Sequential Compression Device 08,20 (11/23/18 14:05)


Dvt/Vte Risk - Notifiy Physici 08 (11/23/18 14:05)


Cho 60g/M 0snack ( Efren) (11/23/18 Lunch)


Consult Physician (11/23/18 14:10)


Patient Visit (11/23/18 )


Pt Eval Moderate Complexity (11/23/18 )


Accucheck Achs ACHS (11/23/18 14:26)


Ambulate 08,12,20 (11/23/18 14:26)


Incentive Spirometry (Nursing) Q2H (11/23/18 14:26)


Oxygen-Administer 07,19 (11/23/18 14:26)


Wound V.A.C Application Ord/In .application order (11/23/18 14:26)


Wound V.A.C Nursing Assessment 06,18 (11/23/18 14:26)


Disposal Tray (Paper/Plastic) (11/23/18 14:26)


Acetaminophen Tablet/Caplet (Tylenol  T (11/23/18 14:30)


Amiodarone Tablet (Cordarone Tablet) (11/24/18 09:00)


Atorvastatin Tablet (Lipitor Tablet) (11/24/18 09:00)


Cholestyramine Lite Powder (Questran Lit (11/23/18 16:00)


Lactobacillus/Bulgaricus Granu (Lactinex (11/23/18 16:00)


Ondansetron Injection (Zofran Injectio (11/23/18 14:30)


Pioglitazone Tablet (Actos Tablet) (11/24/18 07:00)


Polyethylene Glycol Powder Pkt (Miralax (11/23/18 21:00)


Trough Order (Trough Order-Pharmacy Orde (11/23/18 19:00)


Vancomycin Injection (Vancomycin Injecti (11/23/18 20:00)


Amlodipine Tablet (Norvasc Tablet) (11/24/18 09:00)


Insulin Aspart (Novolog) (Novolog (Charg (11/23/18 16:00)


Lisinopril  Tablet (Zestril Tablet) (11/24/18 09:00)


Metoprolol Tartrate (Ir) Tab (Lopressor (11/23/18 21:00)


Metformin Tablet (Glucophage Tablet) (11/23/18 17:00)


Tramadol Tablet (Ultram Tablet) (11/23/18 14:30)


Incentive Spirometry Initial (11/23/18 14:26)


Rt Request For Service (11/23/18 14:26)


Wound V.A.C Q4hr Inspection-St Q4HR (11/23/18 14:26)


Incentive Spirometry (Nursing) Q2H (11/23/18 14:26)


Allopurinol Tablet (Zyloprim Tablet) (11/24/18 09:00)


Oxybutynin  Tablet (Ditropan Tablet) (11/23/18 21:00)


Vancomycin,Trough (11/23/18 19:00)


Vancomycin Oral Suspension (Vancomycin O (12/1/18 06:01)


Acetaminophen Tablet/Caplet (Tylenol  T (11/23/18 18:00)


Rojas Syrup (Cherry Syrup) 60 Ml, Vanco (11/23/18 18:00)


Nursing Communication (Order) (11/23/18 15:01)


Patient Visit (11/23/18 )


Speech Sound Lang Comp (11/23/18 )


Consult Cardiology (11/23/18 17:25)


Consult Physician (11/23/18 17:25)


Physical Therapy Oder (11/23/18 17:58)


Occupational Therapy Order (11/23/18 17:58)


Speech Therapy Orders (11/23/18 17:58)


Vancomycin Injection (Vancomycin Injecti (11/23/18 20:00)


Enoxaparin Injection (Lovenox Injection) (11/24/18 10:45)


Patient Visit (11/24/18 )


Functional Activities, Ea 15 (11/24/18 )


Polyethylene Glycol Powder Pkt (Miralax (11/24/18 13:15)


Senna S Tablet (Senokot S Tablet) (11/24/18 13:15)


Sequential Compression Device 08,20 (11/24/18 18:27)


Straight Cath (Urinary) (11/25/18 05:09)


Luis Cath (11/25/18 12:25)


Bethanechol Tablet (Urecholine Tablet) (11/25/18 12:30)


Tamsulosin Capsule (Flomax Capsule) (11/25/18 18:00)


Phenazopyridine Tablet (Pyridium Tablet) (11/25/18 12:30)


Phenazopyridine Tablet (Pyridium Tablet) (11/25/18 13:00)


Cbc With Automated Diff (11/26/18 06:00)


Comprehensive Metabolic Panel (11/26/18 06:00)


Bladder Scan (11/25/18 14:19)


Catheter(Urinary) Insert & Ass 03,15 (11/25/18 15:36)


Trough Order (Trough Order-Pharmacy Orde (11/26/18 19:00)


Vancomycin,Trough (11/26/18 19:00)


Consult Physician (11/26/18 09:45)


Patient Visit (11/26/18 )


Gait Training, Ea 15 Min (11/26/18 )


Wheelchair Mgmt/Propulsn 15min (11/26/18 )


Functional Activities, Ea 15 (11/26/18 )


Patient Visit (11/26/18 )


Treat. Speech/Lang/Voice (11/26/18 )


Patient Visit (11/26/18 )


Functional Activities, Ea 15 (11/26/18 )


Nursing Communication (Order) (11/26/18 14:53)





Rehab Nursing Orders:  Ongoing Assess. of Cognitive Status, Ongoing Assess. of 

Function Status, Bladder Management, Disease Management & Educaiton, DVT 

Prophylaxis, Fall Prevention, Infection Prevention, Medication Management & 

Education, Management of Risks & Complications, Management of Skin Intergrity, 

Nutrition Management, Pain Management, Patient/Family Support, Wound Management





Intensity of Therapy to be met


Patient to be seen:  15 hrs over 7 cons. days





PT IPOC


Problem List:  Activity Tolerance, Functional Strength, Safety, Balance, Gait, 

Transfer, Bed Mobility, ROM


Treatment Plan:  Continue Plan of Care


Bed Mobility, Concurrent Therapy, Education, Functional Activity Samina, 

Functional Strength, Group Therapy, Gait, Safety, Therapeutic Exercise, 

Transfers


Treatment Duration:  Dec 14, 2018


Frequency:  Modified Program (IRF)


Estimated Hrs Per Day:  1.5 hours per day





OT IPOC


Problems:  Decreased Activ Tolerance, Decreased UE Strength, Dependent Transfers

, Impaired Bed Mobility, Impaired Self-Care Skills


OT Treatment, Training and Edu:  Yes


OT Problems


Pt would benefit from skilled OT to increase his independence in basic self 

care to allow him to safely return home after back surgery


Plan of Care:  ADL Retraining, Functional Mobility, Group Exercise/Act as Ind (

education, exercise, activity tolerance, functional activities, socialization), 

UE Funct Exercise/Act, UE Neuromus Re-Ed/Coord, W/C Management Training


Treatment Duration:  Dec 14, 2018


Frequency:  Modified Program (IRF)


Estimated Hrs Per Day:  1.5 hours per day (1.25 to 1.5)





ST IPOC


Speech Therapy Treatment Plan:  Continue Plan of Care


Treatment Duration:  Dec 14, 2018


Frequency:  Modified Program (IRF)


Estimated Hrs Per Day:  .5 hour per day





/Case Mgmt


/Case Managemen:  Discharge Planning, Patient/Family Counseling





Dietitian/Nutritionist


Dietitian/Nutritionist to monitor nutritional status and make changes and/or 

recommendations as needed and work with speech pathology on dietary upgrades as 

the occur.





Physician IPOC


Medical Issues being managed closely and that require the 24 hour availability 

of a physician:


Wound vac


Postop anemia


Post op urinary retention


A FIB


C DIF bowel colitis


CAD


DM


HLP


Medical Issues:  Bowel/Bladder Function, DVT Prophylaxis, Infection Protection, 

Pain Management, Wound Care, Other (List) (as per above)


Brief Synthesis of Preadmission Screen, Post-Admission Evaluation, and Therapy 


Evaluations: 77 yo male who has had a long course s/p spinal surgery earlier 

this year complicated by Spinal abscess now s/p Redo surgery and wound vac


Has post op Urinary retention and  to see.PMH as per above Had been 

Independent and living with his spouse in [place] prior to surgery  

referred to IRU for ongoing care and therapies


Medical Prognosis:  Good


Anticipated Length of Stay:  12-14-18


Modified Independent to supervision for ADLS and mobility skills Continence of 

bowel and bladder and voiding well


Anticipated d/c Destination:  Home with spouse and [facility]











CJ BROCK MD Nov 26, 2018 16:27

## 2018-11-26 NOTE — PHYSICAL THERAPY DAILY NOTE
PT Daily Note-Current


Subjective


Pt awake in bed watching tv with his wife when PT arrived. Pt agreed to get up 

and work with PT.





Pain





   Numeric Pain Scale:  10-Worst Possible Pain


   Location:  Posterior


   Location Body Site:  Back


   Pain Description:  Acute





Mental Status


Patient Orientation:  Mumbles, Normal For Age


Attachments:  Drains, Luis Catheter


Wound Vac





Transfers


              Functional [place] Measure


0=Not Assessed/NA   4=Minimal Assistance


1=Total Assistance   5=Supervision or Setup


2=Maximal Assistance   6=Modified [place]


3=Moderate Assistance   7=Complete IndependenceIRFPAI Quality Coding Scale











6 Independent with activity with or without an assistive device


 


5  Patient requires set up or clean up by helper.  Patient completes activity  

by  themselves


 


4 Supervision or touching assist (CGA). Conrath provide cues , steadying assist


 


3 The helper provides less than half the effort to complete the activity


 


2 The helper provides more than half the effort to complete the activity


 


1 Dependent.  The helper does all the effort to complete an activity 


 


7 Patient refused to complete or attempt activity


 


9 The patient did not perform the activity before the current illness or injury


 


88 Not attempted due to Medical conditions or safety concerns








Transfers (B, C, W/C) (FIM):  4


Scootin


Rollin


Supine to/from Sit:  4


Sit to/from Stand:  4





Weight Bearing


Right Lower Extremity:  Right


Full Weight Bearing


Left Lower Extremity:  Left


Full Weight Bearing





Gait Training


Does the Patient Walk?:  Yes


Gait (FIM):  1


Distance (FIM):  1=up to 49 ft


Distance:  10'


Gait Level of Assist:  3


Gait Persons Needed:  1


Gait Assistive Device:  FWW


Patient requires Mod A when ambulating with FWW





Assessment


Pt worked on bed/mat mobility at the start of therapy before transferring to 

wheel chair. Pt went with with PT to therapy gym where he worked on sit/stand 

transfers in parallel bars. Pt needed cueing on body weight shifts when 

changing position from sit/stand. Patient is Min A when sit/stand and requires 

Mod A when ambulating with FWW.





PT Short Term Goals


Short Term Goals


Time Frame:  2018


Gait (FIM):  2


Gait Distance Comment:  50'


Gait Level of Assist:  4


Gait Assistive Device:  FWW


Wheelchair Distance:  100'x2





PT Long Term Goals


Long Term Goals


PT Long Term Goals Time Frame:  Dec 14, 2018


Transfers (B,C,W/C) (FIM):  5


Sit to Lying (QC):  4


Lying-Sitting on Side/Bed(QC):  4


Sit to Stand (QC):  4


Rollin


Roll Left to Right (QC):  4


Chair/Bed-to-Chair Xfer(QC):  4


Car Transfer (QC):  4


Gait (FIM):  5


Distance:  150'


Walk 10 feet (QC):  4


Walk 10ft-Uneven Surface(QC):  4


Walk 50ft with 2 Turns (QC):  4


Walk 150 ft (QC):  4


Gait Level of Assist:  5


Gait Assistive Device:  FWW


Stairs (FIM):  2


# of Steps:  4


1 Step (curb) (QC):  4


4 Steps (QC):  4


Stairs Level Of Assist:  4





PT Plan


Problem List


Problem List:  Activity Tolerance, Functional Strength, Safety, Balance, Gait, 

Transfer, Bed Mobility, ROM





Treatment/Plan


Treatment Plan:  Continue Plan of Care


Treatment Plan:  Bed Mobility, Concurrent Therapy, Education, Functional 

Activity Samina, Functional Strength, Group Therapy, Gait, Safety, Therapeutic 

Exercise, Transfers


Treatment Duration:  Dec 14, 2018


Frequency:  Modified Program (IRF)


Estimated Hrs Per Day:  1.5 hours per day


Patient and/or Family Agrees t:  Yes





Time/GCodes


Time In:  1312


Time Out:  1342


Total Billed Treatment Time:  30


Total Billed Treatment


1 Visit


FA x 2 - 30'











DANA CROOKS PT 2018 14:29

## 2018-11-26 NOTE — CARDIOLOGY PROGRESS NOTE
Cardiology SOAP Progress Note


Subjective:


No cardiac complaints.





Objective:


I&O/Vital Signs











 11/26/18 11/26/18





 05:23 09:00


 


Temp 98.7 


 


Pulse 72 


 


Resp 18 


 


B/P (MAP) 124/71 (88) 


 


Pulse Ox 96 


 


O2 Delivery Room Air Room Air














 11/26/18





 00:00


 


Intake Total 1090 ml


 


Output Total 475 ml


 


Balance 615 ml








Weight (Pounds):  193


Weight (Ounces):  8.0


Weight (Calculated Kilograms):  87.122705


Constitutional:  No appears stated age; AAO x 3; No apparent distress, No PERRL

, No well-developed, No well-nourished, No other


Respiratory:  No accessory muscle use, No respiratory distress, No chest tender

, No chest expansion is symmetric; chest is bilaterally symmetric; No lungs 

clear to percussion; lungs clear to auscultation; No crackles, No rhonchi, No 

rales, No stridor, No wheezing, No pleural rub, No other


Cardiovascular:  regular rate-rhythm; No irregularly irregular, No extra beats, 

No parasternal heave is noted, No JVD, No edema, No bradycardia, No tachycardia

, No point of maximal impulse, No cardiac thrills are palpable; S1 and S2; No 

gallop/S3, No gallop/S4, No diastolic murmur, No systolic murmur, No friction 

rub, No click, No other


Gastrointestional:  No tender, No soft, No round, No distended, No pulsatile 

mass, No organomegaly, No guarding, No rebound, No tenderness, No hernia, No 

mass, No audible bowel sounds, No abnormal bowel sounds, No abdominal bruits, 

No spleenomegaly, No other


Extremities:  No normal range of motion, No non-tender, No normal inspection, 

No pedal edema, No calf tenderness, No normal capillary refill, No pelvis stable

, No calf tenderness, No inflammation, No pedal edema, No slow capillary refill

, No swelling, No other, No abrasion, No clubbing, No cyanosis, No ecchymosis, 

No laceration, No no lower extremity edema bilateral, No significant edema, No 

tenderness, No wound


Neurologic/Psychiatric:  no motor/sensory deficits, alert, normal mood/affect, 

oriented x 3


Skin:  No normal color, No warm/dry, No cyanosis, No cool, No diaphoresis, No 

damp, No ecchymosis, No jaundice, No mottled, No pallor, No rash, No tattoos/

piercings, No ulcerations, No rash on exposed areas, No ulcerations on exposed 

areas, No other





Results/Procedures:


Labs


Laboratory Tests


11/25/18 20:23: Glucometer 187H


11/26/18 06:39: Glucometer 135H


11/26/18 06:45: 


White Blood Count 13.3H, Red Blood Count 3.17L, Hemoglobin 9.5L, Hematocrit 28L

, Mean Corpuscular Volume 89, Mean Corpuscular Hemoglobin 30, Mean Corpuscular 

Hemoglobin Concent 34, Red Cell Distribution Width 17.0H, Platelet Count 447H, 

Mean Platelet Volume 9.7, Neutrophils (%) (Auto) 73, Lymphocytes (%) (Auto) 15, 

Monocytes (%) (Auto) 11, Eosinophils (%) (Auto) 1, Basophils (%) (Auto) 0, 

Neutrophils # (Auto) 9.7H, Lymphocytes # (Auto) 2.0, Monocytes # (Auto) 1.5H, 

Eosinophils # (Auto) 0.2, Basophils # (Auto) 0.0, Sodium Level 135, Potassium 

Level 4.0, Chloride Level 106, Carbon Dioxide Level 20L, Anion Gap 9, Blood 

Urea Nitrogen 11, Creatinine 0.85, Estimat Glomerular Filtration Rate > 60, BUN/

Creatinine Ratio 13, Glucose Level 135H, Calcium Level 8.9, Corrected Calcium 

10.2H, Total Bilirubin 0.4, Aspartate Amino Transf (AST/SGOT) 49H, Alanine 

Aminotransferase (ALT/SGPT) 45, Alkaline Phosphatase 189H, Total Protein 4.7L, 

Albumin 2.4L


11/26/18 11:06: Glucometer 142H


11/26/18 16:12: Glucometer 177H








A/P:


Assessment/Dx:


Assessment/Dx:


Spinal abscess,


Sepsis,


UTI,


History of CAD, CABG,


Atrial fibrillation on amiodarone.


LVH,


Diastolic dysfunction.


Plan:





Spinal abscessstatus post I&D. 


Sepsiscontinue broad-spectrum IV antibiotics as per Dr. Pro.  C. difficile, 

on contact precautions.


Paroxysmal atrial fibrillationcurrently in sinus rhythm.  Continue amiodarone.

  I might consider discontinuing amiodarone but since the patient follows Dr. Eckert in [place] I may defer that decision to him.


CAD/history of CABGcontinue lisinopril and statin therapy.  Aspirin low dose 

once okay with Dr. Pro and Dr. Bustos.  Will add low dose beta blocker for 

atrial fibrillation and CAD.


LVH,


Diastolic dysfunction,


Echocardiogram showed normal LV function.


Patient follows with Dr. Eckert in [place].








Thank you for your consultation. Please call me if you have any questions.








KENDALL Zhu MD, FACP, FACC, FSCAI, FHRS, CCDS


Interventional Cardiology


Cardiac Electrophysiology


Vascular Medicine and Endovascular Interventions











MARCIN ZHU MD Nov 26, 2018 5:14 pm

## 2018-11-26 NOTE — SPEECH THERAPY DAILY NOTE
Speech Daily Progress Note


Subjective


Date Seen by Provider:  2018


Time Seen by Provider:  00:30


Patient awake and alert. He had just finished his PT and was tired.





Objective


Patient completed beginning level memory tasks with 80% accuracy given min to 

mod verbal cuing.





Assessment


Assessment Current Status:  Fair Progress





Treatment Plan


Continue Plan of Care





Communication


Comprehension:  4


Expression:  4





Social Cognition


Social Interaction:  4


Problem Solving:  3


Memory:  3





Speech Short Term Goals


Short Term Goals


Short Term Goals


1) Patient will complete memory tasks with 90% accuracy given minimal cuing.


2) Patient will complete problem solving tasks with 90% accuracy given minimal 

cuing.


Time Frame-ST week





Speech Long Term Goals


Long Term Goals


Patient will improve memory and problem solving tasks with minimal cues for 

increased safety and independence at 90% accuracy.





Speech-Plan


Patient/Family Goals


Patient/Family Goals:  


Patient plans to return home as soon as he can with his wife.





Treatment Plan


Speech Therapy Treatment Plan:  Continue Plan of Care


Patient beginning therapy for memory and problem solving.


Frequency:  Modified Program (IRF)


Estimated Hrs Per Day:  .5 hour per day


Rehab Potential:  Fair


Pt/Family Agrees to Plan:  Yes





Safety Risks/Education


Teaching Recipient:  Patient, Significant Other


Teaching Methods:  Discussion


Response to Teaching:  Verbalize Understanding


Education Topics Provided:  


Following procedures for safety and precautions.





Time


Speech Therapy Time In:  09:15


Speech Therapy Time Out:  09:45


Total Billed Time:  30


Billed Treatment Time


[address] 2018 10:00

## 2018-11-26 NOTE — OCCUPATIONAL THER DAILY NOTE
OT Current Status-Daily Note


Subjective


Pt seen in room, up in bed, agreeable to OT. Pain mentioned in groin and pt 

unsure if it's from his back or from his bladder. Pt stated that this is the 

best hospital he has been in.





Appearance


Alert, cooperative





Mental Status/Objective


              Functional [place] Measure


0=Not Assessed/NA   4=Minimal Assistance


1=Total Assistance   5=Supervision or Setup


2=Maximal Assistance   6=Modified [place]


3=Moderate Assistance   7=Complete [place]





ADL-Treatment


Pt requested changing clothes. Supine to sit with mod assist to help lift trunk 

and sit to supine with mod assist to lift legs into bed. Pt rolled side to side 

without help, using bed rail, and also helped pull himself up in bed, although 

two people needed. Sat EOB to change shirt with setup, then needed to lie back 

down. Rolled side to side and helped pull pants down over hips. Unable to get 

pants off lower legs or pant on over feet. Pt rolled to help pull pants up over 

hips. Now has Luis catheter and assistance needed to thread Luis bag through 

pants leg.


              Functional [place] Measure


0=Not Assessed/NA   4=Minimal Assistance


1=Total Assistance   5=Supervision or Setup


2=Maximal Assistance   6=Modified [place]


3=Moderate Assistance   7=Complete IndependenceIRFPAI Quality Coding Scale











6 Independent with activity with or without an assistive device


 


5  Patient requires set up or clean up by helper.  Patient completes activity  

by  themselves


 


4 Supervision or touching assist (CGA). Calvin provide cues , steadying assist


 


3 The helper provides less than half the effort to complete the activity


 


2 The helper provides more than half the effort to complete the activity


 


1 Dependent.  The helper does all the effort to complete an activity 


 


7 Patient refused to complete or attempt activity


 


9 The patient did not perform the activity before the current illness or injury


 


88 Not attempted due to Medical conditions or safety concerns








Upper Body (FIM):  5


Lower Body Dressing (FIM):  2





Other Treatment


Pt provided with yellow theraband and shown 4 different exercises, which he did 

10 reps each. Pt also has red therabad which he used for LE exercise which has 

an UE component. Pt did 10 reps squeezes with foam block and shown intrinsic ex 

with block as well. Pt and wife education on edema management. Provided 

information on laundry to wife. Pt left up in bed, all needs met, 4 rails up.





Education


OT Patient Education:  Exercise program, Instructions to caregiver, Modified 

ADL techniques, Progress toward Goal/Update tx plan, Purpose of tx/functional 

activities


Teaching Recipient:  Patient, Family


Teaching Methods:  Demonstration, Discussion


Response to Teaching:  Verbalize Understanding, Return Demonstration, 

Reinforcement Needed





OT Short Term Goals


Short Term Goals


Time Frame:  2018


Eating(FIM):  5


Grooming(FIM):  5


Bathing(FIM):  3


Upper Body Dressing(FIM):  4


Lower Body Dressing(FIM):  3


Toileting(FIM):  3


Toilet/Commode Transfer(FIM):  4


Additional Short Term Goals:  1-Demonstrate ADL Tasks, 2-Verbalize Understanding

, 3-ImproveStrength/Samina


1=Demonstrate adherence to instructed precautions during ADL tasks.


2=Patient will verbalize/demonstrate understanding of assistive devices/

modifications for ADL.


3=Patient will improve strength/tolerance for activity to enable patient to 

perform ADL's.





OT Long Term Goals


Long Term Goals


Time Frame:  Dec 14, 2018


Eating (FIM):  7


Eating (QC):  6


Groomin


Oral Hygiene (QC):  6


Bathing(FIM):  6


Shower/Bathe Self (QC):  6


Upper Body Dressing(FIM):  6


Upper Body Dressing (QC):  6


Lower Body Dressing(FIM):  6


Lower Body Dressing (QC):  6


On/Off Footwear (QC):  6


Toileting(FIM):  6


Toileting Hygiene (QC):  6


Toilet/Commode Transfer(FIM):  6


Toilet/Commode Transfer (QC):  6


Shower Transfer(FIM):  5 (if allowed per wound vac)


Additional Goals:  1-Demonstrate ADL Tasks, 2-Verbalize Understanding, 3-

ImproveStrength/Samina


1=Demonstrate adherence to instructed precautions during ADL tasks.


2=Patient will verbalize/demonstrate understanding of assistive devices/

modifications for ADL.


3=Patient will improve strength/tolerance for activity to enable patient to 

perform ADL's.





OT Education/Plan


Problem List/Assessment


Pt would benefit from skilled OT to increase his independence in basic self 

care to allow him to safely return home after back surgery





Discharge Recommendations


Plan/Recommendations:  Continue POC





Treatment Plan/Plan of Care


Patient would benefit from OT for education, treatment and training to promote 

independence in ADL's, mobility, safety and/or upper extremity function for ADL'

s.


Plan of Care:  ADL Retraining, Functional Mobility, Group Exercise/Act as Ind (

education, exercise, activity tolerance, functional activities, socialization), 

UE Funct Exercise/Act, UE Neuromus Re-Ed/Coord, W/C Management Training


Treatment Duration:  Dec 14, 2018


Frequency:  Modified Program (IRF)


Estimated Hrs Per Day:  1.5 hours per day (1.25 to 1.5)


Agreement:  Yes


Rehab Potential:  Fair





Time/GCodes


Start Time:  10:55


Stop Time:  11:25


Total Time Billed (hr/min):  30


Billed Treatment Time


visit, 15 minutes ADL, 15 minutes exercise











FERNANDO SMITH OT 2018 11:47

## 2018-11-26 NOTE — PHYSICAL THERAPY DAILY NOTE
PT Daily Note-Current


Subjective


Patient on bedside commode pre tx, agrees to PT, will be co-treating with OT 

for most of treatment.  Patient has 8/10 pain in back.  Patient needs brief put 

on which will require patient to perform sit to stand several times.





Appearance


Patient in bed post tx with nurse call, phone, tray, family in the room.





Mental Status


Patient Orientation:  Person, Place, Situation


wound vac





Transfers


              Functional [place] Measure


0=Not Assessed/NA   4=Minimal Assistance


1=Total Assistance   5=Supervision or Setup


2=Maximal Assistance   6=Modified [place]


3=Moderate Assistance   7=Complete IndependenceIRFPAI Quality Coding Scale











6 Independent with activity with or without an assistive device


 


5  Patient requires set up or clean up by helper.  Patient completes activity  

by  themselves


 


4 Supervision or touching assist (CGA). [place] provide cues , steadying assist


 


3 The helper provides less than half the effort to complete the activity


 


2 The helper provides more than half the effort to complete the activity


 


1 Dependent.  The helper does all the effort to complete an activity 


 


7 Patient refused to complete or attempt activity


 


9 The patient did not perform the activity before the current illness or injury


 


88 Not attempted due to Medical conditions or safety concerns








Transfers (B, C, W/C) (FIM):  3


Scootin


Rollin


Supine to/from Sit:  3


Sit to/from Stand:  4


Bed to/from Chair:  4


mod assist to get both legs into bed, min assist for sit to stand and transfers

, needs cues for hand placement and safety.





Gait Training


Gait (FIM):  1


Distance:  16'x4


Gait Level of Assist:  4


Gait Persons Needed:  1


Gait Assistive Device:  Parallel Bars


Patient ambulated in the parallel bars with min assist, very antalgic.





Wheelchair Training


Does the Pt Use a Wheelchair?:  Yes


Wheelchair (FIM):  2


Distance:  100'x2


Wheelchair Level of Assist:  4


Type of Wheelchair:  Manual





Treatments


bed mobility and transfers, ambulation, wheelchair mobility, dressing, cleaning 

up on commode





Assessment


Current Status:  Poor Progress


Patient is declining with ambulation since being upstairs.  He has constant 

intense pain and often has to stop therapy due to it.  Patient got nauseated 

and threw up a little at the end of treatment.  Patient has little awareness of 

his surroundings and needs cues for obstacles and safety and will get tangled 

in lines easily.  He needs constant cues for hand placement during transfers 

and will often sit before being completely in position.





PT Short Term Goals


Short Term Goals


Time Frame:  2018


Gait (FIM):  2


Gait Distance Comment:  50'


Gait Level of Assist:  4


Gait Assistive Device:  FWW





PT Long Term Goals


Long Term Goals


PT Long Term Goals Time Frame:  Dec 14, 2018


Transfers (B,C,W/C) (FIM):  5


Sit to Lying (QC):  4


Lying-Sitting on Side/Bed(QC):  4


Sit to Stand (QC):  4


Rolling:  3


Roll Left to Right (QC):  4


Chair/Bed-to-Chair Xfer(QC):  4


Car Transfer (QC):  4


Gait (FIM):  5


Distance:  150'


Walk 10 feet (QC):  4


Walk 10ft-Uneven Surface(QC):  4


Walk 50ft with 2 Turns (QC):  4


Walk 150 ft (QC):  4


Gait Level of Assist:  5


Gait Assistive Device:  FWW


Stairs (FIM):  2


# of Steps:  4


1 Step (curb) (QC):  4


4 Steps (QC):  4


Stairs Level Of Assist:  4





PT Plan


Problem List


Problem List:  Activity Tolerance, Functional Strength, Safety, Balance, Gait, 

Transfer, Bed Mobility, ROM





Treatment/Plan


Treatment Plan:  Continue Plan of Care


Treatment Plan:  Bed Mobility, Concurrent Therapy, Education, Functional 

Activity Samina, Functional Strength, Group Therapy, Gait, Safety, Therapeutic 

Exercise, Transfers


Treatment Duration:  Dec 14, 2018


Frequency:  Modified Program (IRF)


Estimated Hrs Per Day:  1.5 hours per day


Patient and/or Family Agrees t:  Yes





Safety Risks/Education


Patient Education:  Gait Training, Transfer Techniques, Correct Positioning, W/

C Management, Safety Issues


Teaching Recipient:  Patient


Teaching Methods:  Demonstration, Discussion


Response to Teaching:  Reinforcement Needed





Time/GCodes


Time In:  0800


Time Out:  0900


Total Billed Treatment Time:  60


Total Billed Treatment


1 visit


[facility] 15'


GT 30'


FA 15'





Co-treated with OT for 50 min from [id number].  PT worked on transfers, bed 

mobility, ambulation, balance during dressing and cleaning.  OT worked on 

dressing, cleaning, UE positioning during ambulation.











MARY SHEPARD PT 2018 10:33

## 2018-11-26 NOTE — PROGRESS NOTE-HOSPITALIST
LISA QUIÑONES  11/26/18 0946:


Subjective


HPI/CC On Admission


Date Seen by Provider:  Nov 26, 2018


Time Seen by Provider:  09:30


Subjective/Events-last exam


Patient participating with therapies


Still wants to go home ASAP


Wife at bedside


Wound vac in place


Pain is controlled


Urinary retention required panda catheter placement and I have consulted and 

conferred with Dr Tawil who will see the patient





Review of Systems


Genitourinary:  Retention





Objective


Exam


Vital Signs





Vital Signs








  Date Time  Temp Pulse Resp B/P (MAP) Pulse Ox O2 Delivery O2 Flow Rate FiO2


 


11/26/18 05:23 98.7 72 18 124/71 (88) 96 Room Air  





Capillary Refill :


General Appearance:  No Apparent Distress, WD/WN, Chronically ill


Respiratory:  Chest Non Tender, Lungs Clear, Normal Breath Sounds, No Accessory 

Muscle Use, No Respiratory Distress


Cardiovascular:  Regular Rate, Rhythm, No Edema, No Gallop, No JVD, No Murmur, 

Normal Peripheral Pulses


Neurologic/Psychiatric:  Alert, Oriented x3, No Motor/Sensory Deficits, Normal 

Mood/Affect





Results/Procedures


Lab


Laboratory Tests


11/26/18 06:45








Patient resulted labs reviewed.





Assessment/Plan


Assessment and Plan


Assess & Plan/Chief Complaint


Assessment:


Debility following spinal abscess status post 2 I&D's uncomplicated per Dr Bustos


Urinary retention acute


C. difficile colitis improved after constipation for 2 days now BM today after 

Miralx and DC Questran


Severe anemia due to acute blood loss status post 4 units of blood while on 

MedSurg and ICU


CAD


DM


CRI





Plan:


Panda cath and consult Dr Tawil


Urecholine and Pyridium to be maintained


Limit pain meds


Miralax and senna


Hold Questran


Vanc PO





Diagnosis/Problems


Diagnosis/Problems





(1) Intraspinal abscess and granuloma


Status:  Resolved


Resolution Date/Time:  11/24/18 @ 14:02


(2) C. difficile colitis


Status:  Acute


(3) Renal insufficiency


Status:  Resolved


Resolution Date/Time:  11/19/18 @ 09:41


(4) Leukocytosis


Status:  Resolved


Resolution Date/Time:  11/24/18 @ 14:02


(5) Transfusion of blood during current hospitalization


Status:  Resolved


Resolution Date/Time:  11/24/18 @ 14:02


(6) Advanced age


Status:  Chronic


(7) CAD (coronary artery disease)


Status:  Chronic


Qualifiers:  


   Coronary Disease-Associated Artery/Lesion type:  native artery  Native vs. 

transplanted heart:  native heart  Associated angina:  without angina  

Qualified Codes:  I25.10 - Atherosclerotic heart disease of native coronary 

artery without angina pectoris


(8) Atrial fibrillation


Status:  Chronic


Qualifiers:  


   Atrial fibrillation type:  paroxysmal  Qualified Codes:  I48.0 - Paroxysmal 

atrial fibrillation


(9) Hx of CABG


Status:  Chronic


(10) Urinary retention


Status:  Acute





Clinical Quality Measures


DVT/VTE Risk/Contraindication:


RFS Level Per Nursing on Admit:  4+=Very High





MACHO QUINTERO MED STUDENT 11/26/18 1038:


Subjective


Subjective/Events-last exam


Patient states that he feels very well


He states that the diarrhea is mostly resolved


He feels that he is getting stronger


He complains of no pain





Objective


Exam


General Appearance:  No Apparent Distress, WD/WN


Respiratory:  Chest Non Tender, Lungs Clear, Normal Breath Sounds, No Accessory 

Muscle Use, No Respiratory Distress


Cardiovascular:  Regular Rate, Rhythm, No Edema, No Gallop, No JVD, No Murmur


Neurologic/Psychiatric:  Alert, Oriented x3, No Motor/Sensory Deficits, Normal 

Mood/Affect


Skin:  Normal Color, Warm/Dry





Assessment/Plan


Assessment and Plan


Assess & Plan/Chief Complaint


Assessment:


1) C. diff colitis


2) Weakness due to prolonged illness





Plan:


1) Continue oral abx therapy


2) Continue inpatient physical rehabilitation











LISA QUIÑONES DO Nov 26, 2018 09:46


MACHO QUINTERO MED STUDENT Nov 26, 2018 10:38

## 2018-11-27 VITALS — DIASTOLIC BLOOD PRESSURE: 56 MMHG | SYSTOLIC BLOOD PRESSURE: 143 MMHG

## 2018-11-27 VITALS — SYSTOLIC BLOOD PRESSURE: 154 MMHG | DIASTOLIC BLOOD PRESSURE: 66 MMHG

## 2018-11-27 LAB
ALBUMIN SERPL-MCNC: 2.4 GM/DL (ref 3.2–4.5)
ALP SERPL-CCNC: 179 U/L (ref 40–136)
ALT SERPL-CCNC: 38 U/L (ref 0–55)
BASOPHILS # BLD AUTO: 0 10^3/UL (ref 0–0.1)
BASOPHILS NFR BLD AUTO: 0 % (ref 0–10)
BILIRUB SERPL-MCNC: 0.4 MG/DL (ref 0.1–1)
BUN/CREAT SERPL: 12
CALCIUM SERPL-MCNC: 8.7 MG/DL (ref 8.5–10.1)
CHLORIDE SERPL-SCNC: 105 MMOL/L (ref 98–107)
CO2 SERPL-SCNC: 21 MMOL/L (ref 21–32)
CREAT SERPL-MCNC: 0.81 MG/DL (ref 0.6–1.3)
EOSINOPHIL # BLD AUTO: 0.1 10^3/UL (ref 0–0.3)
EOSINOPHIL NFR BLD AUTO: 1 % (ref 0–10)
ERYTHROCYTE [DISTWIDTH] IN BLOOD BY AUTOMATED COUNT: 17.1 % (ref 10–14.5)
GFR SERPLBLD BASED ON 1.73 SQ M-ARVRAT: > 60 ML/MIN
GLUCOSE SERPL-MCNC: 147 MG/DL (ref 70–105)
HCT VFR BLD CALC: 27 % (ref 40–54)
HGB BLD-MCNC: 9.2 G/DL (ref 13.3–17.7)
LYMPHOCYTES # BLD AUTO: 1.2 X 10^3 (ref 1–4)
LYMPHOCYTES NFR BLD AUTO: 10 % (ref 12–44)
MANUAL DIFFERENTIAL PERFORMED BLD QL: NO
MCH RBC QN AUTO: 30 PG (ref 25–34)
MCHC RBC AUTO-ENTMCNC: 34 G/DL (ref 32–36)
MCV RBC AUTO: 90 FL (ref 80–99)
MONOCYTES # BLD AUTO: 1.1 X 10^3 (ref 0–1)
MONOCYTES NFR BLD AUTO: 9 % (ref 0–12)
NEUTROPHILS # BLD AUTO: 9.2 X 10^3 (ref 1.8–7.8)
NEUTROPHILS NFR BLD AUTO: 79 % (ref 42–75)
PLATELET # BLD: 410 10^3/UL (ref 130–400)
PMV BLD AUTO: 9.6 FL (ref 7.4–10.4)
POTASSIUM SERPL-SCNC: 4.3 MMOL/L (ref 3.6–5)
PROT SERPL-MCNC: 4.6 GM/DL (ref 6.4–8.2)
RBC # BLD AUTO: 3.05 10^6/UL (ref 4.35–5.85)
SODIUM SERPL-SCNC: 134 MMOL/L (ref 135–145)
WBC # BLD AUTO: 11.6 10^3/UL (ref 4.3–11)

## 2018-11-27 RX ADMIN — POLYETHYLENE GLYCOL (3350) SCH GM: 17 POWDER, FOR SOLUTION ORAL at 19:05

## 2018-11-27 RX ADMIN — SODIUM CHLORIDE SCH MLS/HR: 900 INJECTION, SOLUTION INTRAVENOUS at 21:01

## 2018-11-27 RX ADMIN — AMLODIPINE BESYLATE SCH MG: 5 TABLET ORAL at 08:15

## 2018-11-27 RX ADMIN — PHENAZOPYRIDINE HYDROCHLORIDE SCH MG: 100 TABLET ORAL at 12:23

## 2018-11-27 RX ADMIN — BETHANECHOL CHLORIDE SCH MG: 10 TABLET ORAL at 12:23

## 2018-11-27 RX ADMIN — COMPOUNDING SYRUP VEHICLE SCH ML: 1 SYRUP at 06:54

## 2018-11-27 RX ADMIN — INSULIN ASPART SCH UNIT: 100 INJECTION, SOLUTION INTRAVENOUS; SUBCUTANEOUS at 16:16

## 2018-11-27 RX ADMIN — COMPOUNDING SYRUP VEHICLE SCH ML: 1 SYRUP at 18:24

## 2018-11-27 RX ADMIN — PHENAZOPYRIDINE HYDROCHLORIDE SCH MG: 100 TABLET ORAL at 18:24

## 2018-11-27 RX ADMIN — DOCUSATE SODIUM AND SENNOSIDES SCH EA: 8.6; 5 TABLET, FILM COATED ORAL at 19:05

## 2018-11-27 RX ADMIN — ATORVASTATIN CALCIUM SCH MG: 10 TABLET, FILM COATED ORAL at 08:16

## 2018-11-27 RX ADMIN — COMPOUNDING SYRUP VEHICLE SCH ML: 1 SYRUP at 00:19

## 2018-11-27 RX ADMIN — METOPROLOL TARTRATE SCH MG: 25 TABLET, FILM COATED ORAL at 21:02

## 2018-11-27 RX ADMIN — LACTOBACILLUS ACIDOPHILUS / LACTOBACILLUS BULGARICUS SCH GM: 100 MILLION CFU STRENGTH GRANULES at 21:02

## 2018-11-27 RX ADMIN — ACETAMINOPHEN SCH MG: 325 TABLET ORAL at 12:23

## 2018-11-27 RX ADMIN — ALLOPURINOL SCH MG: 300 TABLET ORAL at 08:15

## 2018-11-27 RX ADMIN — PHENAZOPYRIDINE HYDROCHLORIDE SCH MG: 100 TABLET ORAL at 08:15

## 2018-11-27 RX ADMIN — POLYETHYLENE GLYCOL (3350) SCH GM: 17 POWDER, FOR SOLUTION ORAL at 08:23

## 2018-11-27 RX ADMIN — LACTOBACILLUS ACIDOPHILUS / LACTOBACILLUS BULGARICUS SCH GM: 100 MILLION CFU STRENGTH GRANULES at 12:23

## 2018-11-27 RX ADMIN — LISINOPRIL SCH MG: 20 TABLET ORAL at 08:15

## 2018-11-27 RX ADMIN — TAMSULOSIN HYDROCHLORIDE SCH MG: 0.4 CAPSULE ORAL at 18:24

## 2018-11-27 RX ADMIN — ACETAMINOPHEN SCH MG: 325 TABLET ORAL at 18:23

## 2018-11-27 RX ADMIN — AMIODARONE HYDROCHLORIDE SCH MG: 200 TABLET ORAL at 08:15

## 2018-11-27 RX ADMIN — BETHANECHOL CHLORIDE SCH MG: 10 TABLET ORAL at 16:16

## 2018-11-27 RX ADMIN — LACTOBACILLUS ACIDOPHILUS / LACTOBACILLUS BULGARICUS SCH GM: 100 MILLION CFU STRENGTH GRANULES at 05:28

## 2018-11-27 RX ADMIN — INSULIN ASPART SCH UNIT: 100 INJECTION, SOLUTION INTRAVENOUS; SUBCUTANEOUS at 21:04

## 2018-11-27 RX ADMIN — BETHANECHOL CHLORIDE SCH MG: 10 TABLET ORAL at 21:02

## 2018-11-27 RX ADMIN — METFORMIN HYDROCHLORIDE SCH MG: 500 TABLET, FILM COATED ORAL at 05:28

## 2018-11-27 RX ADMIN — COMPOUNDING SYRUP VEHICLE SCH ML: 1 SYRUP at 12:24

## 2018-11-27 RX ADMIN — METFORMIN HYDROCHLORIDE SCH MG: 500 TABLET, FILM COATED ORAL at 16:16

## 2018-11-27 RX ADMIN — DOCUSATE SODIUM AND SENNOSIDES SCH EA: 8.6; 5 TABLET, FILM COATED ORAL at 08:23

## 2018-11-27 RX ADMIN — PIOGLITAZONE SCH MG: 30 TABLET ORAL at 05:29

## 2018-11-27 RX ADMIN — INSULIN ASPART SCH UNIT: 100 INJECTION, SOLUTION INTRAVENOUS; SUBCUTANEOUS at 06:54

## 2018-11-27 RX ADMIN — ACETAMINOPHEN SCH MG: 325 TABLET ORAL at 00:19

## 2018-11-27 RX ADMIN — ACETAMINOPHEN SCH MG: 325 TABLET ORAL at 05:28

## 2018-11-27 RX ADMIN — METOPROLOL TARTRATE SCH MG: 25 TABLET, FILM COATED ORAL at 08:15

## 2018-11-27 RX ADMIN — LACTOBACILLUS ACIDOPHILUS / LACTOBACILLUS BULGARICUS SCH GM: 100 MILLION CFU STRENGTH GRANULES at 16:16

## 2018-11-27 RX ADMIN — BETHANECHOL CHLORIDE SCH MG: 10 TABLET ORAL at 05:28

## 2018-11-27 RX ADMIN — INSULIN ASPART SCH UNIT: 100 INJECTION, SOLUTION INTRAVENOUS; SUBCUTANEOUS at 12:29

## 2018-11-27 NOTE — DIAGNOSTIC IMAGING REPORT
PROCEDURE: CT lumbar spine without contrast.



TECHNIQUE: Multiple contiguous axial images were obtained through

the lumbar spine without the use of intravenous contrast.

Sagittal and coronal reformations were then performed.



INDICATION: Back pain with recent lumbar surgery.



COMPARISON: None available.



FINDINGS: There is approximately 4 mm of retrolisthesis of L2 on

L3. No additional spondylolisthesis is seen. There has been

laminectomies from L3-L5 and within the midline soft tissue

surgical bed, there are low attenuation with foci of air that

could represent postoperative soft tissue gas and fluid. Imaging

cannot differentiate between infected and noninfected fluid.



Posterior instrumented fusion has been performed with paired

transpedicular screws at L2, L4, L5 and S1. At L3, there are

screw tracks which have been filled with hyperdense particles

likely bone graft material. Some of these particles of presumed

bone graft are within the L2-L3 disc space. Vertical spanning

rods are present with a cross-link at the L3 vertebra level. No

hardware fracture. The tips of the L2 transpedicular screws

terminate within the L1-L2 disc space.



Discectomies have been performed from L3-L4 through L5-S1 and

there are bone cages in place. No subsidence of the cages. No

fracture within the visualized sacrum. SI joints are normal in

alignment.



No definitive fluid collection within the visualized

retroperitoneum. Trace pleural effusions are noted bilaterally.



There is no high-grade spinal stenosis by non-myelogram imaging.



IMPRESSION:

1. There appear to be recent changes from posterior decompression

and instrumented fusion. This includes low-attenuation and

scattered foci of air within the midline laminectomy defects from

L2-L5. There is no definitive loculated fluid collection although

assessment is limited by CT. If there is concern for abscess,

ultrasound could be utilized to assess for fluid collection that

may be amenable to aspiration.

2. Posterior instrumented fusion from L2-S1. The bilateral

transpedicular screws at L2 have tips terminating within the

L1-L2 disc space.



Dictated by: 



  Dictated on workstation # [id number]

## 2018-11-27 NOTE — PHYSICAL THERAPY DAILY NOTE
PT Daily Note-Current


Subjective


Pt laying Supine in bed upon arrival.  Nurse present visiting with pt & sp.  Pt 

agrees to PT.  OT will join tx shortly.





Pain





   Numeric Pain Scale:  10-Worst Possible Pain


   Location Body Site:  Back


   Pain Description:  Stabbing, Tightness, Sharp





Mental Status


Patient Orientation:  Person, Place, Situation


Attachments:  Luis Catheter, Other-See Comments (Wound Vac), IV





Transfers


              Functional [place] Measure


0=Not Assessed/NA   4=Minimal Assistance


1=Total Assistance   5=Supervision or Setup


2=Maximal Assistance   6=Modified [place]


3=Moderate Assistance   7=Complete IndependenceIRFPAI Quality Coding Scale











6 Independent with activity with or without an assistive device


 


5  Patient requires set up or clean up by helper.  Patient completes activity  

by  themselves


 


4 Supervision or touching assist (CGA). [place] provide cues , steadying assist


 


3 The helper provides less than half the effort to complete the activity


 


2 The helper provides more than half the effort to complete the activity


 


1 Dependent.  The helper does all the effort to complete an activity 


 


7 Patient refused to complete or attempt activity


 


9 The patient did not perform the activity before the current illness or injury


 


88 Not attempted due to Medical conditions or safety concerns








Scootin


Rollin


Roll Left to Right (QC):  1


Supine to/from Sit:  1


Sit to Lying (QC):  1





Weight Bearing


Right Lower Extremity:  Right


Full Weight Bearing


Left Lower Extremity:  Left


Full Weight Bearing





Exercises


Supine Ex:  Ankle pumps, Quad Set, Heel Slides


Supine Reps:  10





Treatments


PTA discusses pain control and medical history given by pt before OT arrives.  

Pt reports pain and does not feel that Therapy could be completed today.  OT 

arrives and PTA & OT encourage pt for participation.  Pt. is dressed.  States 

that he just put these on this morning, but spouse corrects him that it was 

yesterday.  Pt. states that he does not want to change clothing or bathe.  

States that he is having "too much pain."  OT/PT co-treat due to pt's pain level

, and need for skilled care.  Pt. is unable to tolerate very much movement.  

Pt. does agree to wash his face, but only in supine.  OT attempts to initiate 

ADLs but pt. states that he can't sit long enough.  Pt. is unable to doff/don 

socks in supine, and is dependent with this task.  PT initiates transfer supine-

sit.  Pt. is educated on log roll.  Unable to bend left LE to push with.  OT 

assists with this and pt. grimaces with pain.  Pt. requires max x 2 for roll 

and supine-sit.  Pt. yells out in pain and states that he can't "do it."  Pt. 

is encouraged to sit long enough to take his pills by mouth.  Nursing has put 

them in cup for pt. Pt. unable to take them from therapist, as he is unable to 

let go of the bed.  Pt. is educated to deep breathe to relax.  Takes pills with 

his mouth while they are put into his mouth.  Immediately states that he needs 

to lay back down.  Max assist sit-supine.  Pt. is positioned to comfort level 

and encouraged rest/relax before attempting next transfer.  Pt. agrees to let 

OT put bed into chair position, but as soon as OT starts it, pt. begins to yell 

in pain again and states that he needs to go back down.  Pt. is encouraged to 

sit up somewhat to clear lungs.  Educated about pneumonia.  Pt. verbalizes 

understanding and states that he wants to get better.  Pt. wants to know why he 

is in so much pain.  OT/PT alternate assisting pt. with UE/LE AROM/PROM 

exercises at bed level.  Pt. completes 2 UE exercises x 10 reps each, and then 

2 LE exercises x 10 reps each.  OT and PT then facilitate PROM exercises with 

knee bends to toleration level, as pt. is unable to do this on his own.  Pt. is 

made comfortable in supine and all needs are met.





Assessment


Current Status:  Poor Progress


Pt wants to get better and participate in Therapy but pain limits.





PT Short Term Goals


Short Term Goals


Time Frame:  2018


Gait (FIM):  2


Gait Distance Comment:  50'


Gait Level of Assist:  4


Gait Assistive Device:  FWW


Wheelchair Distance:  100'x2





PT Long Term Goals


Long Term Goals


PT Long Term Goals Time Frame:  Dec 14, 2018


Transfers (B,C,W/C) (FIM):  5


Sit to Lying (QC):  4


Lying-Sitting on Side/Bed(QC):  4


Sit to Stand (QC):  4


Rollin


Roll Left to Right (QC):  4


Chair/Bed-to-Chair Xfer(QC):  4


Car Transfer (QC):  4


Gait (FIM):  5


Distance:  150'


Walk 10 feet (QC):  4


Walk 10ft-Uneven Surface(QC):  4


Walk 50ft with 2 Turns (QC):  4


Walk 150 ft (QC):  4


Gait Level of Assist:  5


Gait Assistive Device:  FWW


Stairs (FIM):  2


# of Steps:  4


1 Step (curb) (QC):  4


4 Steps (QC):  4


Stairs Level Of Assist:  4





PT Plan


Problem List


Problem List:  Activity Tolerance, Functional Strength, Safety, Balance, Gait, 

Transfer, Bed Mobility





Treatment/Plan


Treatment Plan:  Continue Plan of Care


Treatment Plan:  Bed Mobility, Concurrent Therapy, Education, Functional 

Activity Samina, Functional Strength, Group Therapy, Gait, Safety, Therapeutic 

Exercise, Transfers


Treatment Duration:  Dec 14, 2018


Frequency:  Modified Program (IRF)


Estimated Hrs Per Day:  1.5 hours per day


Patient and/or Family Agrees t:  Yes





Safety Risks/Education


Patient Education:  Transfer Techniques, Reviewed Precautions, Correct 

Positioning, Safety Issues


Teaching Recipient:  Patient, Significant Other


Teaching Methods:  Discussion


Response to Teaching:  Verbalize Understanding, Reinforcement Needed





Time/GCodes


Time In:  800


Time Out:  915


Total Billed Treatment Time:  75


Total Billed Treatment


1, FA x3 (45m) & EX x2 (30m)





Co-treat w/OT 50m (513-448)


G Codes Necessary:  FREDDIE Longo PTA 2018 09:19

## 2018-11-27 NOTE — SPEECH THERAPY DAILY NOTE
Speech Daily Progress Note


Subjective


Date Seen by Provider:  2018


Time Seen by Provider:  00:30


Patient stated he didn't sleep much last night due to his pain level. He has 

had a pain patch placed this am which seems to be helping.





Objective


Patient completed memory tasks with 60% accuracy given mod verbal cues.





Assessment


Assessment Current Status:  Fair Progress





Treatment Plan


Continue Plan of Care





Communication


Comprehension:  4


Expression:  4





Social Cognition


Social Interaction:  4


Problem Solving:  3


Memory:  3





Speech Short Term Goals


Short Term Goals


Short Term Goals


1) Patient will complete memory tasks with 90% accuracy given minimal cuing.


2) Patient will complete problem solving tasks with 90% accuracy given minimal 

cuing.


Time Frame-ST week





Speech Long Term Goals


Long Term Goals


Patient will improve memory and problem solving tasks with minimal cues for 

increased safety and independence at 90% accuracy.





Speech-Plan


Patient/Family Goals


Patient/Family Goals:  


Patient plans to return home with his wife as soon as he is able.





Treatment Plan


Speech Therapy Treatment Plan:  Continue Plan of Care


Patient is experiencing a lot of pain. He participates well with ST activities.


Treatment Duration:  Dec 14, 2018


Frequency:  Modified Program (IRF)


Estimated Hrs Per Day:  .5 hour per day


Rehab Potential:  Fair


Barriers to Learning:  


Patient is [place].


Pt/Family Agrees to Plan:  Yes





Safety Risks/Education


Teaching Recipient:  Patient, Significant Other


Teaching Methods:  Discussion


Response to Teaching:  Verbalize Understanding





Time


Speech Therapy Time In:  09:15


Speech Therapy Time Out:  09:45


Total Billed Time:  30


Billed Treatment Time


1, SAURABH Garcia 2018 12:41

## 2018-11-27 NOTE — PM & R (SOAP) PROGRESS NOTE
Subjective


This was a face to face visit with the patient.


Date Seen by Provider:  Nov 27, 2018


Time Seen by Provider:  07:50


Subjective/Events-last exam


Patient was seen in his room this AM Patient describes pain in low back with 

radiation into both buttocks and rt groin more than left.D Patch ordered for 

pain control Will text ortho-spine to see if F/U imaging study can be 

done.Patient fairly dependent for transfers with pain limiting progress as well 

as weakness


Date Identified:  Nov 27, 2018


Time Identified:  07:45


Medication Intervention:  


D Patch ordered for enhanced pain control


Review of Systems


Musculoskeletal:  back pain, leg pain


Neurological:  Weakness





Objective


Physician Exam


Last Set of Vital Signs





Vital Signs








  Date Time  Temp Pulse Resp B/P (MAP) Pulse Ox O2 Delivery O2 Flow Rate FiO2


 


11/27/18 09:00      Room Air  


 


11/27/18 06:48 98.1 80 20 154/66 (95) 96   





Capillary Refill :


I&O











Intake and Output 


 


 11/27/18





 00:00


 


Intake Total 1290 ml


 


Output Total 900 ml


 


Balance 390 ml


 


 


 


Intake Oral 1040 ml


 


IV Total 250 ml


 


Output Urine Total 900 ml


 


# Bowel Movements 2








General:  Alert, Cooperative, No Acute Distress


HEENT:  Atraumatic, PERRLA, EOMI, Mucous Memb Moist/Pink, Other ([facility] has 

hearing aids but out for the evening)


Neck:  Supple, No JVD


Lungs:  Clear to Auscultation


Heart:  Regular Rate


Abdomen:  Normal Bowel Sounds, Soft, No Tenderness


Extremities:  No Edema


Skin:  Other (has wound vac over incision)


Neuro:  Other (cognitive impairment with memory strength 4/5 uppers 3+/5 lowers 

sensation intact)


Psych/Mental Status:  Other (Mild memory impairment)





Results


Lab Data


Laboratory Tests


11/25/18 06:09: Glucometer 87


11/25/18 11:31: Glucometer 148H


11/25/18 16:19: Glucometer 159H


11/25/18 20:23: Glucometer 187H


11/26/18 06:39: Glucometer 135H


11/26/18 06:45: 


White Blood Count 13.3H, Red Blood Count 3.17L, Hemoglobin 9.5L, Hematocrit 28L

, Mean Corpuscular Volume 89, Mean Corpuscular Hemoglobin 30, Mean Corpuscular 

Hemoglobin Concent 34, Red Cell Distribution Width 17.0H, Platelet Count 447H, 

Mean Platelet Volume 9.7, Neutrophils (%) (Auto) 73, Lymphocytes (%) (Auto) 15, 

Monocytes (%) (Auto) 11, Eosinophils (%) (Auto) 1, Basophils (%) (Auto) 0, 

Neutrophils # (Auto) 9.7H, Lymphocytes # (Auto) 2.0, Monocytes # (Auto) 1.5H, 

Eosinophils # (Auto) 0.2, Basophils # (Auto) 0.0, Sodium Level 135, Potassium 

Level 4.0, Chloride Level 106, Carbon Dioxide Level 20L, Anion Gap 9, Blood 

Urea Nitrogen 11, Creatinine 0.85, Estimat Glomerular Filtration Rate > 60, BUN/

Creatinine Ratio 13, Glucose Level 135H, Calcium Level 8.9, Corrected Calcium 

10.2H, Total Bilirubin 0.4, Aspartate Amino Transf (AST/SGOT) 49H, Alanine 

Aminotransferase (ALT/SGPT) 45, Alkaline Phosphatase 189H, Total Protein 4.7L, 

Albumin 2.4L


11/26/18 11:06: Glucometer 142H


11/26/18 16:12: Glucometer 177H


11/26/18 19:30: Vancomycin Level Trough 18.1


11/26/18 20:35: Glucometer 136H


11/27/18 05:26: Glucometer 130H


11/27/18 11:50: 


White Blood Count 11.6H, Red Blood Count 3.05L, Hemoglobin 9.2L, Hematocrit 27L

, Mean Corpuscular Volume 90, Mean Corpuscular Hemoglobin 30, Mean Corpuscular 

Hemoglobin Concent 34, Red Cell Distribution Width 17.1H, Platelet Count 410H, 

Mean Platelet Volume 9.6, Neutrophils (%) (Auto) 79H, Lymphocytes (%) (Auto) 10L

, Monocytes (%) (Auto) 9, Eosinophils (%) (Auto) 1, Basophils (%) (Auto) 0, 

Neutrophils # (Auto) 9.2H, Lymphocytes # (Auto) 1.2, Monocytes # (Auto) 1.1H, 

Eosinophils # (Auto) 0.1, Basophils # (Auto) 0.0





Assessment/Plan


Assessment and Plan


Spinal abscess with sepsis s/p spinal surgery off antibiotics and on wound vac


Urinary retention DR Tawil managing


C D bowel colitis under treatment and with contact precautions RN states that 

stools are soft


A FIB controlled with med


CAD s/p CABG


DM controlled with med


Postop anemia s/p transfusion


HLP


Plan Continue PT/OT


Pain management-D patch ordered


Team Conference tomorrow


F/U with DR Bustos re reimaging study


Co-Morbidities that are continuing to impact the rehab process: (include details

)











CJ BROCK MD Nov 27, 2018 12:18

## 2018-11-27 NOTE — PROGRESS NOTE-HOSPITALIST
Subjective


HPI/CC On Admission


Date Seen by Provider:  Nov 27, 2018


Time Seen by Provider:  10:30


Subjective/Events-last exam


Patient had been doing well then now not doing well and therapists thinks he is 

losing ground


Same pain as he had in the initial presentation so I check labs and notified Dr Bustos


No loose stools indicating good response of C diff to Vanc PO so that would not 

be causing elevated wbc


CT lumbar spine was ordered at Dr Bustos request


Panda cath was hopefully going to be removed tomorrow per Dr Tawil





Review of Systems


Musculoskeletal:  back pain





Objective


Exam


Vital Signs





Vital Signs








  Date Time  Temp Pulse Resp B/P (MAP) Pulse Ox O2 Delivery O2 Flow Rate FiO2


 


11/27/18 18:26 98.1 78 20 143/56 (85) 98 Room Air  





Capillary Refill :


General Appearance:  No Apparent Distress, WD/WN, Chronically ill


Respiratory:  Chest Non Tender, Lungs Clear, Normal Breath Sounds, No Accessory 

Muscle Use, No Respiratory Distress


Cardiovascular:  Regular Rate, Rhythm, No Edema, No Gallop, No JVD, No Murmur, 

Normal Peripheral Pulses


Neurologic/Psychiatric:  Alert, Oriented x3, No Motor/Sensory Deficits, Normal 

Mood/Affect


Skin:  Normal Color, Warm/Dry





Results/Procedures


Lab


Laboratory Tests


11/27/18 11:50








Patient resulted labs reviewed.





Assessment/Plan


Assessment and Plan


Assess & Plan/Chief Complaint


Assessment:


Recurrent severe back pain with referred lower abdominal pain similar to 

initial presentation obtaining labs and CT scan and Dr Bustos consultation


Debility following spinal abscess status post 2 I&D's uncomplicated per Dr Bustos


Urinary retention acute maintained with panda but Dr Tawil consulted


C. difficile colitis improved after constipation for 2 days now BM yesterday 

after Miralax and DC Questran


Severe anemia due to acute blood loss status post 4 units of blood while on 

MedSurg and ICU


CAD


DM


CRI





Plan:


Panda cath and consult Dr Tawil is appreciated


Urecholine and Pyridium to be maintained


Limit pain meds


Miralax and senna


Hold Questran


Vanc PO


Check labs


CT scan l-spine


Appreciate Dr Bustos





Diagnosis/Problems


Diagnosis/Problems





(1) Intraspinal abscess and granuloma


Status:  Resolved


Resolution Date/Time:  11/24/18 @ 14:02


(2) C. difficile colitis


Status:  Acute


(3) Renal insufficiency


Status:  Resolved


Resolution Date/Time:  11/19/18 @ 09:41


(4) Leukocytosis


Status:  Resolved


Resolution Date/Time:  11/24/18 @ 14:02


(5) Transfusion of blood during current hospitalization


Status:  Resolved


Resolution Date/Time:  11/24/18 @ 14:02


(6) Advanced age


Status:  Chronic


(7) CAD (coronary artery disease)


Status:  Chronic


Qualifiers:  


   Coronary Disease-Associated Artery/Lesion type:  native artery  Native vs. 

transplanted heart:  native heart  Associated angina:  without angina  

Qualified Codes:  I25.10 - Atherosclerotic heart disease of native coronary 

artery without angina pectoris


(8) Atrial fibrillation


Status:  Chronic


Qualifiers:  


   Atrial fibrillation type:  paroxysmal  Qualified Codes:  I48.0 - Paroxysmal 

atrial fibrillation


(9) Hx of CABG


Status:  Chronic


(10) Urinary retention


Status:  Acute





Clinical Quality Measures


DVT/VTE Risk/Contraindication:


RFS Level Per Nursing on Admit:  4+=Very High











LISA QUIÑONES DO Nov 27, 2018 10:21

## 2018-11-27 NOTE — CONSULTATION REPORT
DATE OF SERVICE:  11/27/2018



ATTENDING PHYSICIAN:

Dr. Pro - Dr. Lucas.



SUMMARY:

An 78-year-old white man, who had three back surgeries lately and intraspinal

abscess and then all complicated by C. diff, actually now on p.o. vancomycin. 

He was found to have urinary retention and catheter was inserted.  He was

started on Flomax 0.4 mg daily and Urecholine 10 mg q.i.d. before meals and at

bedtime.  The patient does have a history of voiding symptoms in the form of

nocturia x4 to 5, slowing of the stream, incomplete emptying of the bladder.  He

has never been treated for that.  He has not had any prostate or bladder

surgeries.  Physical exam was deferred for now because of the Clostridium

infection and possible diarrhea.



IMPRESSION:

Urinary retention, combination of benign prostatic hyperplasia and neurogenic

bladder.



PLAN:

Continue present management.  We will give him a trial of voiding tomorrow. 

Later on, if all fail, he may need a cystoscopy and rectal exam once the C. diff

is under control.



Thank you for letting me participate in the care of this patient.  We will

follow with you.





Job ID: [id number]

DocumentID: [id number]

Dictated Date:  11/27/2018 07:41:23

Transcription Date: 11/27/2018 11:24:59

Dictated By: ELIAS TAWIL, MD

## 2018-11-27 NOTE — OCCUPATIONAL THER DAILY NOTE
OT Current Status-Daily Note


Subjective


Pt. reports "severe" pain in back, down left side, and in bilateral groin areas 

during and throughout treatment with movement of legs, and with sitting 

position.  Does not state pain number, but yells out whenever he is in pain.





Appearance


Pt. in bed supine when OT enters room.  Agrees to work with therapy.





Mental Status/Objective


Patient Orientation:  Person, Place


              Functional [place] Measure


0=Not Assessed/NA   4=Minimal Assistance


1=Total Assistance   5=Supervision or Setup


2=Maximal Assistance   6=Modified [place]


3=Moderate Assistance   7=Complete [place]


Attachments:  Luis Catheter, IV


Wound vac





ADL-Treatment


              Functional [place] Measure


0=Not Assessed/NA   4=Minimal Assistance


1=Total Assistance   5=Supervision or Setup


2=Maximal Assistance   6=Modified [place]


3=Moderate Assistance   7=Complete IndependenceIRFPAI Quality Coding Scale











6 Independent with activity with or without an assistive device


 


5  Patient requires set up or clean up by helper.  Patient completes activity  

by  themselves


 


4 Supervision or touching assist (CGA). [place] provide cues , steadying assist


 


3 The helper provides less than half the effort to complete the activity


 


2 The helper provides more than half the effort to complete the activity


 


1 Dependent.  The helper does all the effort to complete an activity 


 


7 Patient refused to complete or attempt activity


 


9 The patient did not perform the activity before the current illness or injury


 


88 Not attempted due to Medical conditions or safety concerns








Lower Body Dressing (QC):  1


On/Off Footwear (QC):  1





Other Treatment


Pt. is dressed.  States that he just put these on this morning, but spouse 

corrects him that it was yesterday.  Pt. states that he does not want to change 

clothing or bathe.  States that he is having "too much pain."  OT/PT co-treat 

due to pt's pain level, and need for skilled care.  Pt. is unable to tolerate 

very much movement.  Pt. does agree to wash his face, but only in supine.  OT 

attempts to initiate ADLs but pt. states that he can't sit long enough.  Pt. is 

unable to doff/don socks in supine, and is dependent with this task.  PT 

initiates transfer supine-sit.  Pt. is educated on log roll.  Unable to bend 

left LE to push with.  OT assists with this and pt. grimaces with pain.  Pt. 

requires max x 2 for roll and supine-sit.  Pt. yells out in pain and states 

that he can't "do it."  Pt. is encouraged to sit long enough to take his pills 

by mouth.  Nursing has put them in cup for pt. Pt. unable to take them from 

therapist, as he is unable to let go of the bed.  Pt. is educated to deep 

breathe to relax.  Takes pills with his mouth while they are put into his 

mouth.  Immediately states that he needs to lay back down.  Max assist sit-

supine.  Pt. is positioned to comfort level and encouraged rest/relax before 

attempting next transfer.  Pt. agrees to let OT put bed into chair position, 

but as soon as OT starts it, pt. begins to yell in pain again and states that 

he needs to go back down.  Pt. is encouraged to sit up somewhat to clear lungs.

  Educated about pneumonia.  Pt. verbalizes understanding and states that he 

wants to get better.  Pt. wants to know why he is in so much pain.  OT/PT 

alternate assisting pt. with UE/LE AROM/PROM exercises at bed level.  Pt. 

completes 2 UE exercises x 10 reps each, and then 2 LE exercises x 10 reps 

each.  OT and PT then facilitate PROM exercises with knee bends to toleration 

level, as pt. is unable to do this on his own.  Pt. is made comfortable in 

supine and all needs are met.





Education


OT Patient Education:  Correct positioning, Exercise program, Modified ADL 

techniques, Progress toward Goal/Update tx plan, Purpose of tx/functional 

activities, Reviewed precautions, Rehab process, Transfer techniques


Teaching Recipient:  Patient, Significant Other


Teaching Methods:  Demonstration, Discussion


Response to Teaching:  Verbalize Understanding, Return Demonstration





OT Short Term Goals


Short Term Goals


Time Frame:  2018


Eating(FIM):  5


Grooming(FIM):  5


Bathing(FIM):  3


Upper Body Dressing(FIM):  4


Lower Body Dressing(FIM):  3


Toileting(FIM):  3


Toilet/Commode Transfer(FIM):  4


Additional Short Term Goals:  1-Demonstrate ADL Tasks, 2-Verbalize Understanding

, 3-ImproveStrength/[place]


1=Demonstrate adherence to instructed precautions during ADL tasks.


2=Patient will verbalize/demonstrate understanding of assistive devices/

modifications for ADL.


3=Patient will improve strength/tolerance for activity to enable patient to 

perform ADL's.





OT Long Term Goals


Long Term Goals


Time Frame:  Dec 14, 2018


Eating (FIM):  7


Eating (QC):  6


Groomin


Oral Hygiene (QC):  6


Bathing(FIM):  6


Shower/Bathe Self (QC):  6


Upper Body Dressing(FIM):  6


Upper Body Dressing (QC):  6


Lower Body Dressing(FIM):  6


Lower Body Dressing (QC):  6


On/Off Footwear (QC):  6


Toileting(FIM):  6


Toileting Hygiene (QC):  6


Toilet/Commode Transfer(FIM):  6


Toilet/Commode Transfer (QC):  6


Shower Transfer(FIM):  5 (if allowed per wound vac)


Additional Goals:  1-Demonstrate ADL Tasks, 2-Verbalize Understanding, 3-

ImproveStrength/Samina


1=Demonstrate adherence to instructed precautions during ADL tasks.


2=Patient will verbalize/demonstrate understanding of assistive devices/

modifications for ADL.


3=Patient will improve strength/tolerance for activity to enable patient to 

perform ADL's.





OT Education/Plan


Problem List/Assessment


Assessment:  Decreased Activ Tolerance, Decreased UE Strength, Dependent 

Transfers, Impaired Bed Mobility, Impaired Funct Balance, Impaired I ADL's, 

Impaired Self-Care Skills, Restricted Funct UE ROM


Pt would benefit from skilled OT to increase his independence in basic self 

care to allow him to safely return home after back surgery





Discharge Recommendations


Plan/Recommendations:  Continue POC


Therapy D/C Recommendations:  24 hr Supervision





Treatment Plan/Plan of Care


Treatment,Training & Education:  Yes


Patient would benefit from OT for education, treatment and training to promote 

independence in ADL's, mobility, safety and/or upper extremity function for ADL'

s.


Plan of Care:  ADL Retraining, Functional Mobility, Group Exercise/Act as Ind (

education, exercise, activity tolerance, functional activities, socialization), 

UE Funct Exercise/Act, UE Neuromus Re-Ed/Coord, W/C Management Training


Treatment Duration:  Dec 14, 2018


Frequency:  At least 5 of 7 days/Wk (IRF)


Estimated Hrs Per Day:  1.5 hours per day (1.25 to 1.5)


Agreement:  Yes


Rehab Potential:  Fair





Time/GCodes


Start Time:  08:20


Stop Time:  09:10


Total Time Billed (hr/min):  50


Billed Treatment Time


1, FA x 50minutes











JOSE MOSS OT 2018 10:33

## 2018-11-27 NOTE — CARDIOLOGY PROGRESS NOTE
Cardiology SOAP Progress Note


Subjective:


Complains of back discomfort.





Objective:


I&O/Vital Signs











 11/27/18 11/27/18





 06:48 09:00


 


Temp 98.1 


 


Pulse 80 


 


Resp 20 


 


B/P (MAP) 154/66 (95) 


 


Pulse Ox 96 


 


O2 Delivery Room Air Room Air














 11/27/18





 00:00


 


Intake Total 990 ml


 


Output Total 575 ml


 


Balance 415 ml








Weight (Pounds):  193


Weight (Ounces):  8.0


Weight (Calculated Kilograms):  87.067849


Constitutional:  No appears stated age; AAO x 3; No apparent distress, No PERRL

, No well-developed, No well-nourished, No other


Respiratory:  No accessory muscle use, No respiratory distress, No chest tender

, No chest expansion is symmetric; chest is bilaterally symmetric; No lungs 

clear to percussion; lungs clear to auscultation; No crackles, No rhonchi, No 

rales, No stridor, No wheezing, No pleural rub, No other


Cardiovascular:  regular rate-rhythm; No irregularly irregular, No extra beats, 

No parasternal heave is noted, No JVD, No edema, No bradycardia, No tachycardia

, No point of maximal impulse, No cardiac thrills are palpable; S1 and S2; No 

gallop/S3, No gallop/S4, No diastolic murmur, No systolic murmur, No friction 

rub, No click, No other


Gastrointestional:  No tender, No soft, No round, No distended, No pulsatile 

mass, No organomegaly, No guarding, No rebound, No tenderness, No hernia, No 

mass, No audible bowel sounds, No abnormal bowel sounds, No abdominal bruits, 

No spleenomegaly, No other


Extremities:  No normal range of motion, No non-tender, No normal inspection, 

No pedal edema, No calf tenderness, No normal capillary refill, No pelvis stable

, No calf tenderness, No inflammation, No pedal edema, No slow capillary refill

, No swelling, No other, No abrasion, No clubbing, No cyanosis, No ecchymosis, 

No laceration, No no lower extremity edema bilateral, No significant edema, No 

tenderness, No wound


Neurologic/Psychiatric:  no motor/sensory deficits, alert, normal mood/affect, 

oriented x 3


Skin:  No normal color, No warm/dry, No cyanosis, No cool, No diaphoresis, No 

damp, No ecchymosis, No jaundice, No mottled, No pallor, No rash, No tattoos/

piercings, No ulcerations, No rash on exposed areas, No ulcerations on exposed 

areas, No other





Results/Procedures:


Labs


Laboratory Tests


11/26/18 16:12: Glucometer 177H


11/26/18 19:30: Vancomycin Level Trough 18.1


11/26/18 20:35: Glucometer 136H


11/27/18 05:26: Glucometer 130H


11/27/18 11:50: 


White Blood Count 11.6H, Red Blood Count 3.05L, Hemoglobin 9.2L, Hematocrit 27L

, Mean Corpuscular Volume 90, Mean Corpuscular Hemoglobin 30, Mean Corpuscular 

Hemoglobin Concent 34, Red Cell Distribution Width 17.1H, Platelet Count 410H, 

Mean Platelet Volume 9.6, Neutrophils (%) (Auto) 79H, Lymphocytes (%) (Auto) 10L

, Monocytes (%) (Auto) 9, Eosinophils (%) (Auto) 1, Basophils (%) (Auto) 0, 

Neutrophils # (Auto) 9.2H, Lymphocytes # (Auto) 1.2, Monocytes # (Auto) 1.1H, 

Eosinophils # (Auto) 0.1, Basophils # (Auto) 0.0, Sodium Level 134L, Potassium 

Level 4.3, Chloride Level 105, Carbon Dioxide Level 21, Anion Gap 8, Blood Urea 

Nitrogen 10, Creatinine 0.81, Estimat Glomerular Filtration Rate > 60, BUN/

Creatinine Ratio 12, Glucose Level 147H, Calcium Level 8.7, Corrected Calcium 

10.0, Total Bilirubin 0.4, Aspartate Amino Transf (AST/SGOT) 44H, Alanine 

Aminotransferase (ALT/SGPT) 38, Alkaline Phosphatase 179H, Total Protein 4.6L, 

Albumin 2.4L








A/P:


Assessment/Dx:


Assessment/Dx:


Spinal abscess,


Sepsis,


UTI,


History of CAD, CABG,


Atrial fibrillation on amiodarone.


LVH,


Diastolic dysfunction.


Plan:





Spinal abscessstatus post I&D.  Still complains of back discomfort.  Dr. Lucas aware.


Sepsiscontinue broad-spectrum IV antibiotics as per Dr. Pro.  C. difficile, 

on contact precautions.


Paroxysmal atrial fibrillationcurrently in sinus rhythm.  Continue amiodarone.

  I might consider discontinuing amiodarone but since the patient follows Dr. Eckert in [place] I may defer that decision to him.


CAD/history of CABGcontinue lisinopril and statin therapy.  Aspirin low dose 

once okay with Dr. Pro and Dr. Bustos.  Will add low dose beta blocker for 

atrial fibrillation and CAD.


LVH,


Diastolic dysfunction,


Echocardiogram showed normal LV function.


Patient follows with Dr. Eckert in [place].








Thank you for your consultation. Please call me if you have any questions.








KENDALL Zhu MD, FACP, FACC, FSCAI, FHRS, CCDS


Interventional Cardiology


Cardiac Electrophysiology


Vascular Medicine and Endovascular Interventions











MARCIN ZHU MD Nov 27, 2018 1:08 pm

## 2018-11-27 NOTE — OCC THERAPY PROGRESS NOTE
Therapy Progress Note


Per physician, pt. on hold for therapy until further testing.





1330











JOSE MOSS OT Nov 27, 2018 13:29

## 2018-11-27 NOTE — PROGRESS NOTE (SOAP)
Subjective


Date Seen by a Provider:  Nov 27, 2018


Time Seen by a Provider:  12:51


Subjective/Events-last exam


Worsening pain again, was better, but now worse again.  Hurts in back and wraps 

around the front, and hurts into groins. Weak with hip flexion.





Objective


Exam





Vital Signs








  Date Time  Temp Pulse Resp B/P (MAP) Pulse Ox O2 Delivery O2 Flow Rate FiO2


 


11/27/18 09:00      Room Air  


 


11/27/18 06:48 98.1 80 20 154/66 (95) 96 Room Air  


 


11/26/18 20:30      Room Air  


 


11/26/18 18:00 99.2 72 20 137/78 (97) 96 Room Air  














I & O 


 


 11/27/18





 07:00


 


Intake Total 1240 ml


 


Output Total 1175 ml


 


Balance 65 ml





Capillary Refill :


General Appearance:  No Apparent Distress


Neck:  Supple


Respiratory:  No Accessory Muscle Use, No Respiratory Distress


Cardiovascular:  Normal Peripheral Pulses


Gastrointestinal:  non tender, soft


Extremity:  No Calf Tenderness


Neurologic/Psychiatric:  Motor Weakness, Other (weakness in hip flexors, no 

pain with hip rom)


Skin:  Other (incision in back open, wound vac in place, looks ok.)





Results


Lab


Laboratory Tests


11/26/18 16:12: Glucometer 177H


11/26/18 19:30: Vancomycin Level Trough 18.1


11/26/18 20:35: Glucometer 136H


11/27/18 05:26: Glucometer 130H


11/27/18 11:50: 


White Blood Count 11.6H, Red Blood Count 3.05L, Hemoglobin 9.2L, Hematocrit 27L

, Mean Corpuscular Volume 90, Mean Corpuscular Hemoglobin 30, Mean Corpuscular 

Hemoglobin Concent 34, Red Cell Distribution Width 17.1H, Platelet Count 410H, 

Mean Platelet Volume 9.6, Neutrophils (%) (Auto) 79H, Lymphocytes (%) (Auto) 10L

, Monocytes (%) (Auto) 9, Eosinophils (%) (Auto) 1, Basophils (%) (Auto) 0, 

Neutrophils # (Auto) 9.2H, Lymphocytes # (Auto) 1.2, Monocytes # (Auto) 1.1H, 

Eosinophils # (Auto) 0.1, Basophils # (Auto) 0.0, Sodium Level 134L, Potassium 

Level 4.3, Chloride Level 105, Carbon Dioxide Level 21, Anion Gap 8, Blood Urea 

Nitrogen 10, Creatinine 0.81, Estimat Glomerular Filtration Rate > 60, BUN/

Creatinine Ratio 12, Glucose Level 147H, Calcium Level 8.7, Corrected Calcium 

10.0, Total Bilirubin 0.4, Aspartate Amino Transf (AST/SGOT) 44H, Alanine 

Aminotransferase (ALT/SGPT) 38, Alkaline Phosphatase 179H, Total Protein 4.6L, 

Albumin 2.4L





Assessment/Plan


Assessment/Plan


Assess & Plan/Chief Complaint


Persistent back pain, groin pain





Possible early hardware failure vs psoas abscess





Plan: Check MRI scan L spine and esr/crp.





Clinical Quality Measures


DVT/VTE Risk/Contraindication:


RFS Level Per Nursing on Admit:  4+=Very High











ENZO DONALDSON MD Nov 27, 2018 12:55

## 2018-11-28 VITALS — DIASTOLIC BLOOD PRESSURE: 54 MMHG | SYSTOLIC BLOOD PRESSURE: 114 MMHG

## 2018-11-28 VITALS — DIASTOLIC BLOOD PRESSURE: 69 MMHG | SYSTOLIC BLOOD PRESSURE: 146 MMHG

## 2018-11-28 VITALS — SYSTOLIC BLOOD PRESSURE: 130 MMHG | DIASTOLIC BLOOD PRESSURE: 62 MMHG

## 2018-11-28 LAB
ALBUMIN SERPL-MCNC: 2.3 GM/DL (ref 3.2–4.5)
ALP SERPL-CCNC: 169 U/L (ref 40–136)
ALT SERPL-CCNC: 33 U/L (ref 0–55)
BASOPHILS # BLD AUTO: 0 10^3/UL (ref 0–0.1)
BASOPHILS NFR BLD AUTO: 0 % (ref 0–10)
BILIRUB SERPL-MCNC: 0.4 MG/DL (ref 0.1–1)
BUN/CREAT SERPL: 12
CALCIUM SERPL-MCNC: 8.5 MG/DL (ref 8.5–10.1)
CHLORIDE SERPL-SCNC: 106 MMOL/L (ref 98–107)
CO2 SERPL-SCNC: 21 MMOL/L (ref 21–32)
CREAT SERPL-MCNC: 0.82 MG/DL (ref 0.6–1.3)
EOSINOPHIL # BLD AUTO: 0.3 10^3/UL (ref 0–0.3)
EOSINOPHIL NFR BLD AUTO: 3 % (ref 0–10)
ERYTHROCYTE [DISTWIDTH] IN BLOOD BY AUTOMATED COUNT: 17 % (ref 10–14.5)
GFR SERPLBLD BASED ON 1.73 SQ M-ARVRAT: > 60 ML/MIN
GLUCOSE SERPL-MCNC: 100 MG/DL (ref 70–105)
HCT VFR BLD CALC: 27 % (ref 40–54)
HGB BLD-MCNC: 8.7 G/DL (ref 13.3–17.7)
LYMPHOCYTES # BLD AUTO: 1.9 X 10^3 (ref 1–4)
LYMPHOCYTES NFR BLD AUTO: 17 % (ref 12–44)
MANUAL DIFFERENTIAL PERFORMED BLD QL: NO
MCH RBC QN AUTO: 29 PG (ref 25–34)
MCHC RBC AUTO-ENTMCNC: 33 G/DL (ref 32–36)
MCV RBC AUTO: 90 FL (ref 80–99)
MONOCYTES # BLD AUTO: 1.1 X 10^3 (ref 0–1)
MONOCYTES NFR BLD AUTO: 10 % (ref 0–12)
NEUTROPHILS # BLD AUTO: 7.9 X 10^3 (ref 1.8–7.8)
NEUTROPHILS NFR BLD AUTO: 70 % (ref 42–75)
PLATELET # BLD: 431 10^3/UL (ref 130–400)
PMV BLD AUTO: 9.7 FL (ref 7.4–10.4)
POTASSIUM SERPL-SCNC: 4 MMOL/L (ref 3.6–5)
PROT SERPL-MCNC: 4.4 GM/DL (ref 6.4–8.2)
RBC # BLD AUTO: 2.97 10^6/UL (ref 4.35–5.85)
SODIUM SERPL-SCNC: 135 MMOL/L (ref 135–145)
WBC # BLD AUTO: 11.2 10^3/UL (ref 4.3–11)

## 2018-11-28 RX ADMIN — AMLODIPINE BESYLATE SCH MG: 5 TABLET ORAL at 08:53

## 2018-11-28 RX ADMIN — LACTOBACILLUS ACIDOPHILUS / LACTOBACILLUS BULGARICUS SCH GM: 100 MILLION CFU STRENGTH GRANULES at 11:56

## 2018-11-28 RX ADMIN — COMPOUNDING SYRUP VEHICLE SCH ML: 1 SYRUP at 17:42

## 2018-11-28 RX ADMIN — ACETAMINOPHEN SCH MG: 325 TABLET ORAL at 17:43

## 2018-11-28 RX ADMIN — ALLOPURINOL SCH MG: 300 TABLET ORAL at 08:53

## 2018-11-28 RX ADMIN — PHENAZOPYRIDINE HYDROCHLORIDE SCH MG: 100 TABLET ORAL at 13:37

## 2018-11-28 RX ADMIN — METOPROLOL TARTRATE SCH MG: 25 TABLET, FILM COATED ORAL at 21:18

## 2018-11-28 RX ADMIN — ATORVASTATIN CALCIUM SCH MG: 10 TABLET, FILM COATED ORAL at 08:53

## 2018-11-28 RX ADMIN — INSULIN ASPART SCH UNIT: 100 INJECTION, SOLUTION INTRAVENOUS; SUBCUTANEOUS at 07:48

## 2018-11-28 RX ADMIN — LACTOBACILLUS ACIDOPHILUS / LACTOBACILLUS BULGARICUS SCH GM: 100 MILLION CFU STRENGTH GRANULES at 21:18

## 2018-11-28 RX ADMIN — INSULIN ASPART SCH UNIT: 100 INJECTION, SOLUTION INTRAVENOUS; SUBCUTANEOUS at 21:18

## 2018-11-28 RX ADMIN — PHENAZOPYRIDINE HYDROCHLORIDE SCH MG: 100 TABLET ORAL at 09:06

## 2018-11-28 RX ADMIN — METOPROLOL TARTRATE SCH MG: 25 TABLET, FILM COATED ORAL at 08:53

## 2018-11-28 RX ADMIN — INSULIN ASPART SCH UNIT: 100 INJECTION, SOLUTION INTRAVENOUS; SUBCUTANEOUS at 11:48

## 2018-11-28 RX ADMIN — TAMSULOSIN HYDROCHLORIDE SCH MG: 0.4 CAPSULE ORAL at 17:39

## 2018-11-28 RX ADMIN — COMPOUNDING SYRUP VEHICLE SCH ML: 1 SYRUP at 11:57

## 2018-11-28 RX ADMIN — AMIODARONE HYDROCHLORIDE SCH MG: 200 TABLET ORAL at 08:57

## 2018-11-28 RX ADMIN — INSULIN ASPART SCH UNIT: 100 INJECTION, SOLUTION INTRAVENOUS; SUBCUTANEOUS at 16:22

## 2018-11-28 RX ADMIN — POLYETHYLENE GLYCOL (3350) SCH GM: 17 POWDER, FOR SOLUTION ORAL at 21:18

## 2018-11-28 RX ADMIN — ACETAMINOPHEN SCH MG: 325 TABLET ORAL at 06:20

## 2018-11-28 RX ADMIN — ACETAMINOPHEN SCH MG: 325 TABLET ORAL at 00:29

## 2018-11-28 RX ADMIN — DOCUSATE SODIUM AND SENNOSIDES SCH EA: 8.6; 5 TABLET, FILM COATED ORAL at 08:55

## 2018-11-28 RX ADMIN — BETHANECHOL CHLORIDE SCH MG: 10 TABLET ORAL at 06:19

## 2018-11-28 RX ADMIN — LACTOBACILLUS ACIDOPHILUS / LACTOBACILLUS BULGARICUS SCH GM: 100 MILLION CFU STRENGTH GRANULES at 06:19

## 2018-11-28 RX ADMIN — PIOGLITAZONE SCH MG: 30 TABLET ORAL at 06:19

## 2018-11-28 RX ADMIN — COMPOUNDING SYRUP VEHICLE SCH ML: 1 SYRUP at 06:20

## 2018-11-28 RX ADMIN — PHENAZOPYRIDINE HYDROCHLORIDE SCH MG: 100 TABLET ORAL at 17:43

## 2018-11-28 RX ADMIN — POLYETHYLENE GLYCOL (3350) SCH GM: 17 POWDER, FOR SOLUTION ORAL at 08:55

## 2018-11-28 RX ADMIN — DOCUSATE SODIUM AND SENNOSIDES SCH EA: 8.6; 5 TABLET, FILM COATED ORAL at 21:18

## 2018-11-28 RX ADMIN — ACETAMINOPHEN SCH MG: 325 TABLET ORAL at 11:56

## 2018-11-28 RX ADMIN — COMPOUNDING SYRUP VEHICLE SCH ML: 1 SYRUP at 00:29

## 2018-11-28 RX ADMIN — LISINOPRIL SCH MG: 20 TABLET ORAL at 08:53

## 2018-11-28 RX ADMIN — METFORMIN HYDROCHLORIDE SCH MG: 500 TABLET, FILM COATED ORAL at 17:40

## 2018-11-28 RX ADMIN — SODIUM CHLORIDE SCH MLS/HR: 900 INJECTION, SOLUTION INTRAVENOUS at 21:18

## 2018-11-28 RX ADMIN — LACTOBACILLUS ACIDOPHILUS / LACTOBACILLUS BULGARICUS SCH GM: 100 MILLION CFU STRENGTH GRANULES at 16:22

## 2018-11-28 RX ADMIN — METFORMIN HYDROCHLORIDE SCH MG: 500 TABLET, FILM COATED ORAL at 06:19

## 2018-11-28 NOTE — PROGRESS NOTE-UROLOGY
Progress Note-Urology


Progress Notes/Assess & Plan


Progress/Assessment & Plan


TOV TODAY


Final Diagnosis


URINE RETENTION











TAWIL,ELIAS A MD Nov 28, 2018 6:56 am

## 2018-11-28 NOTE — PROGRESS NOTE-HOSPITALIST
LISA QUIÑONES  11/28/18 1157:


Subjective


HPI/CC On Admission


Date Seen by Provider:  Nov 28, 2018


Time Seen by Provider:  11:30


Subjective/Events-last exam


Updated patient on the plan


Pain is well controlled


Stools are soft no diarrhea





Review of Systems


Musculoskeletal:  back pain





Objective


Exam


Vital Signs





Vital Signs








  Date Time  Temp Pulse Resp B/P (MAP) Pulse Ox O2 Delivery O2 Flow Rate FiO2


 


11/28/18 18:00 99.4 67 20 114/54 (74) 91 Room Air  





Capillary Refill :


General Appearance:  No Apparent Distress, WD/WN, Chronically ill


Respiratory:  Chest Non Tender, Lungs Clear, Normal Breath Sounds, No Accessory 

Muscle Use, No Respiratory Distress


Cardiovascular:  Regular Rate, Rhythm, No Edema, No Gallop, No JVD, No Murmur, 

Normal Peripheral Pulses


Gastrointestinal:  Normal Bowel Sounds, No Organomegaly, No Pulsatile Mass, Non 

Tender, Soft


Neurologic/Psychiatric:  Alert, Oriented x3, No Motor/Sensory Deficits, Normal 

Mood/Affect





Results/Procedures


Lab


Laboratory Tests


11/28/18 06:25








Patient resulted labs reviewed.





Assessment/Plan


Assessment and Plan


Assess & Plan/Chief Complaint


Assessment:


Recurrent severe back pain with referred lower abdominal pain similar to 

initial presentation obtained labs and CT scan and Dr Bustos consultation which 

revealed displaced hardware in need of repeat surgery tomorrow 11/29/18


Debility following spinal abscess status post 2 I&D's uncomplicated per Dr Bustos


Urinary retention acute maintained with panda but Dr Tawil consulted


C. difficile colitis improved after constipation for 3 days now BM 2 days ago 

after Miralax and DC Questran


Severe anemia due to acute blood loss status post 4 units of blood while on 

MedSurg and ICU


CAD


DM


CRI





Plan:


Hardware modification tomorrow


Panda cath and consult Dr Tawil is appreciated


Urecholine and Pyridium to be maintained


Limit pain meds


Miralax and senna


Hold Questran


Vanc PO


Check labs in am


Appreciate Dr Bustos





Diagnosis/Problems


Diagnosis/Problems





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


Status:  Acute


(2) Intraspinal abscess and granuloma


Status:  Resolved


Resolution Date/Time:  11/24/18 @ 14:02


(3) C. difficile colitis


Status:  Acute


(4) Renal insufficiency


Status:  Resolved


Resolution Date/Time:  11/19/18 @ 09:41


(5) Leukocytosis


Status:  Resolved


Resolution Date/Time:  11/24/18 @ 14:02


(6) Transfusion of blood during current hospitalization


Status:  Resolved


Resolution Date/Time:  11/24/18 @ 14:02


(7) Advanced age


Status:  Chronic


(8) CAD (coronary artery disease)


Status:  Chronic


Qualifiers:  


   Coronary Disease-Associated Artery/Lesion type:  native artery  Native vs. 

transplanted heart:  native heart  Associated angina:  without angina  

Qualified Codes:  I25.10 - Atherosclerotic heart disease of native coronary 

artery without angina pectoris


(9) Atrial fibrillation


Status:  Chronic


Qualifiers:  


   Atrial fibrillation type:  paroxysmal  Qualified Codes:  I48.0 - Paroxysmal 

atrial fibrillation


(10) Hx of CABG


Status:  Chronic


(11) Urinary retention


Status:  Acute





Clinical Quality Measures


DVT/VTE Risk/Contraindication:


RFS Level Per Nursing on Admit:  4+=Very High





MACHO QUINTERO MED STUDENT 11/28/18 1515:


Subjective


Subjective/Events-last exam


Patient states that he is feeling much better this morning


He reports no pain


He says that his appetite is returning


Physical therapy is on hold


He is scheduled to have back surgery today to repair failed equipment





Objective


Exam


General Appearance:  No Apparent Distress, WD/WN


Respiratory:  Chest Non Tender, Lungs Clear, Normal Breath Sounds, No Accessory 

Muscle Use, No Respiratory Distress


Cardiovascular:  Regular Rate, Rhythm, No Edema, No Gallop, No JVD, No Murmur


Gastrointestinal:  Normal Bowel Sounds, No Organomegaly, No Pulsatile Mass, Non 

Tender, Soft


Neurologic/Psychiatric:  Alert, Oriented x3, No Motor/Sensory Deficits, Normal 

Mood/Affect


Skin:  Normal Color, Warm/Dry





Assessment/Plan


Assessment and Plan


Assess & Plan/Chief Complaint


Assessment:


1) Back pain due to equipment failure


2) Debility due to repeated illness





Plan:


1) Orthopedic surgery today


2) Inpatient rehabilitation











LISA QUIÑONES DO Nov 28, 2018 11:57


MACHO QUINTERO MED STUDENT Nov 28, 2018 15:15

## 2018-11-28 NOTE — PHYSICAL THERAPY PROGRESS NOTE
Therapy Progress Note


Pt on hold, pending surgical intervention 11/29/18.











QUINTEN ALLEN PT Nov 28, 2018 13:47

## 2018-11-28 NOTE — PM & R (SOAP) PROGRESS NOTE
Subjective


This was a face to face visit with the patient.


Date Seen by Provider:  Nov 28, 2018


Time Seen by Provider:  07:50


Subjective/Events-last exam


Patient was seen in his room this AM Appreciate DR Peña note and CT of the 

Lumbar spine Patient to have redo surgery tomorrow due to loosening 

hardware.Discussed with staff Patient may have therapy as tolerated today 

according to ortho-spine


Review of Systems


Musculoskeletal:  back pain, leg pain





Objective


Physician Exam


Last Set of Vital Signs





Vital Signs








  Date Time  Temp Pulse Resp B/P (MAP) Pulse Ox O2 Delivery O2 Flow Rate FiO2


 


11/28/18 06:38 98.2 71 18 130/62 (84) 95 Room Air  





Capillary Refill :


I&O











Intake and Output 


 


 11/28/18





 00:00


 


Intake Total 750 ml


 


Output Total 1150 ml


 


Balance -400 ml


 


 


 


Intake Oral 750 ml


 


Output Urine Total 1150 ml


 


# Bowel Movements 1








General:  Alert, Cooperative, No Acute Distress


HEENT:  Atraumatic, PERRLA, EOMI, Mucous Memb Moist/Pink, Other ([facility] has 

hearing aids but out for the evening)


Neck:  Supple, No JVD


Lungs:  Clear to Auscultation


Heart:  Regular Rate


Abdomen:  Normal Bowel Sounds, Soft, No Tenderness


Extremities:  No Edema


Skin:  Other (has wound vac over incision)


Neuro:  Other (cognitive impairment with memory strength 4/5 uppers 3+/5 lowers 

sensation intact)


Psych/Mental Status:  Other (Mild memory impairment)





Results


Lab Data


Laboratory Tests


11/25/18 11:31: Glucometer 148H


11/25/18 16:19: Glucometer 159H


11/25/18 20:23: Glucometer 187H


11/26/18 06:39: Glucometer 135H


11/26/18 06:45: 


White Blood Count 13.3H, Red Blood Count 3.17L, Hemoglobin 9.5L, Hematocrit 28L

, Mean Corpuscular Volume 89, Mean Corpuscular Hemoglobin 30, Mean Corpuscular 

Hemoglobin Concent 34, Red Cell Distribution Width 17.0H, Platelet Count 447H, 

Mean Platelet Volume 9.7, Neutrophils (%) (Auto) 73, Lymphocytes (%) (Auto) 15, 

Monocytes (%) (Auto) 11, Eosinophils (%) (Auto) 1, Basophils (%) (Auto) 0, 

Neutrophils # (Auto) 9.7H, Lymphocytes # (Auto) 2.0, Monocytes # (Auto) 1.5H, 

Eosinophils # (Auto) 0.2, Basophils # (Auto) 0.0, Sodium Level 135, Potassium 

Level 4.0, Chloride Level 106, Carbon Dioxide Level 20L, Anion Gap 9, Blood 

Urea Nitrogen 11, Creatinine 0.85, Estimat Glomerular Filtration Rate > 60, BUN/

Creatinine Ratio 13, Glucose Level 135H, Calcium Level 8.9, Corrected Calcium 

10.2H, Total Bilirubin 0.4, Aspartate Amino Transf (AST/SGOT) 49H, Alanine 

Aminotransferase (ALT/SGPT) 45, Alkaline Phosphatase 189H, Total Protein 4.7L, 

Albumin 2.4L


11/26/18 11:06: Glucometer 142H


11/26/18 16:12: Glucometer 177H


11/26/18 19:30: Vancomycin Level Trough 18.1


11/26/18 20:35: Glucometer 136H


11/27/18 05:26: Glucometer 130H


11/27/18 11:50: 


White Blood Count 11.6H, Red Blood Count 3.05L, Hemoglobin 9.2L, Hematocrit 27L

, Mean Corpuscular Volume 90, Mean Corpuscular Hemoglobin 30, Mean Corpuscular 

Hemoglobin Concent 34, Red Cell Distribution Width 17.1H, Platelet Count 410H, 

Mean Platelet Volume 9.6, Neutrophils (%) (Auto) 79H, Lymphocytes (%) (Auto) 10L

, Monocytes (%) (Auto) 9, Eosinophils (%) (Auto) 1, Basophils (%) (Auto) 0, 

Neutrophils # (Auto) 9.2H, Lymphocytes # (Auto) 1.2, Monocytes # (Auto) 1.1H, 

Eosinophils # (Auto) 0.1, Basophils # (Auto) 0.0, Erythrocyte Sedimentation 

Rate 30, Sodium Level 134L, Potassium Level 4.3, Chloride Level 105, Carbon 

Dioxide Level 21, Anion Gap 8, Blood Urea Nitrogen 10, Creatinine 0.81, Estimat 

Glomerular Filtration Rate > 60, BUN/Creatinine Ratio 12, Glucose Level 147H, 

Calcium Level 8.7, Corrected Calcium 10.0, Total Bilirubin 0.4, Aspartate Amino 

Transf (AST/SGOT) 44H, Alanine Aminotransferase (ALT/SGPT) 38, Alkaline 

Phosphatase 179H, C-Reactive Protein High Sensitivity 4.54H, Total Protein 4.6L

, Albumin 2.4L


11/27/18 16:09: Glucometer 190H


11/27/18 21:00: Glucometer 144H


11/28/18 06:25: 


White Blood Count 11.2H, Red Blood Count 2.97L, Hemoglobin 8.7L, Hematocrit 27L

, Mean Corpuscular Volume 90, Mean Corpuscular Hemoglobin 29, Mean Corpuscular 

Hemoglobin Concent 33, Red Cell Distribution Width 17.0H, Platelet Count 431H, 

Mean Platelet Volume 9.7, Neutrophils (%) (Auto) 70, Lymphocytes (%) (Auto) 17, 

Monocytes (%) (Auto) 10, Eosinophils (%) (Auto) 3, Basophils (%) (Auto) 0, 

Neutrophils # (Auto) 7.9H, Lymphocytes # (Auto) 1.9, Monocytes # (Auto) 1.1H, 

Eosinophils # (Auto) 0.3, Basophils # (Auto) 0.0, Sodium Level 135, Potassium 

Level 4.0, Chloride Level 106, Carbon Dioxide Level 21, Anion Gap 8, Blood Urea 

Nitrogen 10, Creatinine 0.82, Estimat Glomerular Filtration Rate > 60, BUN/

Creatinine Ratio 12, Glucose Level 100, Calcium Level 8.5, Corrected Calcium 9.9

, Total Bilirubin 0.4, Aspartate Amino Transf (AST/SGOT) 42H, Alanine 

Aminotransferase (ALT/SGPT) 33, Alkaline Phosphatase 169H, Total Protein 4.4L, 

Albumin 2.3L





Assessment/Plan


Assessment and Plan


Hardware failure lumbar spine surgery


Spinal abscess with sepsis s/p spinal surgery of antibiotics but on wound vac


Urinary retention Luis to remain in due to pending surgery


C dif bowel colitis under treatment and with contact precautions 


A FIB controlled with med


CAD s/p CABG


DM controlled with med


Postop anemia s/p transfusion


HLP


Plan Hold therapy  today 


Redo surgery tomorrow with DR Bustos


Team Conference to be held later today-see report for full functional update 

and POC


Will f/u with SW/Team re disposition after redo surgery


Co-Morbidities that are continuing to impact the rehab process: (include details

)











CJ BROCK MD Nov 28, 2018 08:52

## 2018-11-28 NOTE — CARDIOLOGY PROGRESS NOTE
Cardiology SOAP Progress Note


Subjective:


No cardiac complaints.





Objective:


I&O/Vital Signs











 11/28/18 11/28/18 11/28/18





 06:38 08:51 09:00


 


Temp 98.2  


 


Pulse 71 74 


 


Resp 18  


 


B/P (MAP) 130/62 (84) 146/69 (94) 


 


Pulse Ox 95  


 


O2 Delivery Room Air  Room Air














 11/28/18





 00:00


 


Intake Total 500 ml


 


Output Total 550 ml


 


Balance -50 ml








Weight (Pounds):  193


Weight (Ounces):  8.0


Weight (Calculated Kilograms):  87.223857


Constitutional:  No appears stated age; AAO x 3; No apparent distress, No PERRL

, No well-developed, No well-nourished, No other


Respiratory:  No accessory muscle use, No respiratory distress, No chest tender

, No chest expansion is symmetric; chest is bilaterally symmetric; No lungs 

clear to percussion; lungs clear to auscultation; No crackles, No rhonchi, No 

rales, No stridor, No wheezing, No pleural rub, No other


Cardiovascular:  regular rate-rhythm; No irregularly irregular, No extra beats, 

No parasternal heave is noted, No JVD, No edema, No bradycardia, No tachycardia

, No point of maximal impulse, No cardiac thrills are palpable; S1 and S2; No 

gallop/S3, No gallop/S4, No diastolic murmur, No systolic murmur, No friction 

rub, No click, No other


Gastrointestional:  No tender, No soft, No round, No distended, No pulsatile 

mass, No organomegaly, No guarding, No rebound, No tenderness, No hernia, No 

mass, No audible bowel sounds, No abnormal bowel sounds, No abdominal bruits, 

No spleenomegaly, No other


Extremities:  No normal range of motion, No non-tender, No normal inspection, 

No pedal edema, No calf tenderness, No normal capillary refill, No pelvis stable

, No calf tenderness, No inflammation, No pedal edema, No slow capillary refill

, No swelling, No other, No abrasion, No clubbing, No cyanosis, No ecchymosis, 

No laceration, No no lower extremity edema bilateral, No significant edema, No 

tenderness, No wound


Neurologic/Psychiatric:  no motor/sensory deficits, alert, normal mood/affect, 

oriented x 3


Skin:  No normal color, No warm/dry, No cyanosis, No cool, No diaphoresis, No 

damp, No ecchymosis, No jaundice, No mottled, No pallor, No rash, No tattoos/

piercings, No ulcerations, No rash on exposed areas, No ulcerations on exposed 

areas, No other





Results/Procedures:


Labs


Laboratory Tests


11/27/18 16:09: Glucometer 190H


11/27/18 21:00: Glucometer 144H


11/28/18 06:25: 


White Blood Count 11.2H, Red Blood Count 2.97L, Hemoglobin 8.7L, Hematocrit 27L

, Mean Corpuscular Volume 90, Mean Corpuscular Hemoglobin 29, Mean Corpuscular 

Hemoglobin Concent 33, Red Cell Distribution Width 17.0H, Platelet Count 431H, 

Mean Platelet Volume 9.7, Neutrophils (%) (Auto) 70, Lymphocytes (%) (Auto) 17, 

Monocytes (%) (Auto) 10, Eosinophils (%) (Auto) 3, Basophils (%) (Auto) 0, 

Neutrophils # (Auto) 7.9H, Lymphocytes # (Auto) 1.9, Monocytes # (Auto) 1.1H, 

Eosinophils # (Auto) 0.3, Basophils # (Auto) 0.0, Sodium Level 135, Potassium 

Level 4.0, Chloride Level 106, Carbon Dioxide Level 21, Anion Gap 8, Blood Urea 

Nitrogen 10, Creatinine 0.82, Estimat Glomerular Filtration Rate > 60, BUN/

Creatinine Ratio 12, Glucose Level 100, Calcium Level 8.5, Corrected Calcium 9.9

, Total Bilirubin 0.4, Aspartate Amino Transf (AST/SGOT) 42H, Alanine 

Aminotransferase (ALT/SGPT) 33, Alkaline Phosphatase 169H, Total Protein 4.4L, 

Albumin 2.3L


11/28/18 11:47: Glucometer 115H


11/28/18 12:00: 








A/P:


Assessment/Dx:


Assessment/Dx:


Spinal abscess,


Sepsis,


UTI,


History of CAD, CABG,


Atrial fibrillation on amiodarone.


LVH,


Diastolic dysfunction.


Plan:





Spinal abscessstatus post I&D.  Still complains of back discomfort.  Dr. Lucas aware. post re-operation tomorrow.


Sepsiscontinue broad-spectrum IV antibiotics as per Dr. Pro.  C. difficile, 

on contact precautions.


Paroxysmal atrial fibrillationcurrently in sinus rhythm.  Continue amiodarone.

  I might consider discontinuing amiodarone but since the patient follows Dr. Eckert in [place] I may defer that decision to him.


CAD/history of CABGcontinue lisinopril and statin therapy.  Aspirin low dose 

once okay with Dr. Pro and Dr. Bustos.  Will add low dose beta blocker for 

atrial fibrillation and CAD.


LVH,


Diastolic dysfunction,


Echocardiogram showed normal LV function.


Patient follows with Dr. Eckert in [place].








Thank you for your consultation. Please call me if you have any questions.








KENDALL Zhu MD, FACP, FACC, FSCAI, FHRS, CCDS


Interventional Cardiology


Cardiac Electrophysiology


Vascular Medicine and Endovascular Interventions











MARCIN ZHU MD Nov 28, 2018 2:25 pm

## 2018-11-29 ENCOUNTER — HOSPITAL ENCOUNTER (INPATIENT)
Dept: HOSPITAL 75 - SDC | Age: 78
LOS: 11 days | Discharge: NURSING, REHAB OR LONG-TERM CARE | DRG: 459 | End: 2018-12-10
Attending: ORTHOPAEDIC SURGERY | Admitting: ORTHOPAEDIC SURGERY
Payer: MEDICARE

## 2018-11-29 VITALS — DIASTOLIC BLOOD PRESSURE: 68 MMHG | SYSTOLIC BLOOD PRESSURE: 104 MMHG

## 2018-11-29 VITALS — DIASTOLIC BLOOD PRESSURE: 42 MMHG | SYSTOLIC BLOOD PRESSURE: 105 MMHG

## 2018-11-29 VITALS — SYSTOLIC BLOOD PRESSURE: 87 MMHG | DIASTOLIC BLOOD PRESSURE: 51 MMHG

## 2018-11-29 VITALS — DIASTOLIC BLOOD PRESSURE: 69 MMHG | SYSTOLIC BLOOD PRESSURE: 145 MMHG

## 2018-11-29 VITALS — HEIGHT: 64 IN | WEIGHT: 229.06 LBS | BODY MASS INDEX: 39.11 KG/M2

## 2018-11-29 VITALS — DIASTOLIC BLOOD PRESSURE: 65 MMHG | SYSTOLIC BLOOD PRESSURE: 122 MMHG

## 2018-11-29 VITALS — SYSTOLIC BLOOD PRESSURE: 118 MMHG | DIASTOLIC BLOOD PRESSURE: 107 MMHG

## 2018-11-29 VITALS — SYSTOLIC BLOOD PRESSURE: 123 MMHG | DIASTOLIC BLOOD PRESSURE: 62 MMHG

## 2018-11-29 VITALS — SYSTOLIC BLOOD PRESSURE: 91 MMHG | DIASTOLIC BLOOD PRESSURE: 80 MMHG

## 2018-11-29 VITALS — DIASTOLIC BLOOD PRESSURE: 54 MMHG | SYSTOLIC BLOOD PRESSURE: 114 MMHG

## 2018-11-29 DIAGNOSIS — Z66: ICD-10-CM

## 2018-11-29 DIAGNOSIS — E66.9: ICD-10-CM

## 2018-11-29 DIAGNOSIS — Z51.5: ICD-10-CM

## 2018-11-29 DIAGNOSIS — F41.9: ICD-10-CM

## 2018-11-29 DIAGNOSIS — E78.00: ICD-10-CM

## 2018-11-29 DIAGNOSIS — A04.72: ICD-10-CM

## 2018-11-29 DIAGNOSIS — I25.10: ICD-10-CM

## 2018-11-29 DIAGNOSIS — F32.9: ICD-10-CM

## 2018-11-29 DIAGNOSIS — N40.0: ICD-10-CM

## 2018-11-29 DIAGNOSIS — B95.8: ICD-10-CM

## 2018-11-29 DIAGNOSIS — D62: ICD-10-CM

## 2018-11-29 DIAGNOSIS — G06.1: ICD-10-CM

## 2018-11-29 DIAGNOSIS — R54: ICD-10-CM

## 2018-11-29 DIAGNOSIS — R60.0: ICD-10-CM

## 2018-11-29 DIAGNOSIS — R05: ICD-10-CM

## 2018-11-29 DIAGNOSIS — Z95.1: ICD-10-CM

## 2018-11-29 DIAGNOSIS — E87.6: ICD-10-CM

## 2018-11-29 DIAGNOSIS — T84.216A: Primary | ICD-10-CM

## 2018-11-29 DIAGNOSIS — R33.9: ICD-10-CM

## 2018-11-29 DIAGNOSIS — E11.9: ICD-10-CM

## 2018-11-29 DIAGNOSIS — T84.63XA: ICD-10-CM

## 2018-11-29 DIAGNOSIS — N18.9: ICD-10-CM

## 2018-11-29 DIAGNOSIS — I12.9: ICD-10-CM

## 2018-11-29 DIAGNOSIS — R41.0: ICD-10-CM

## 2018-11-29 DIAGNOSIS — B96.5: ICD-10-CM

## 2018-11-29 DIAGNOSIS — B37.49: ICD-10-CM

## 2018-11-29 DIAGNOSIS — I48.2: ICD-10-CM

## 2018-11-29 DIAGNOSIS — T40.4X5A: ICD-10-CM

## 2018-11-29 LAB
ALBUMIN SERPL-MCNC: 2.2 GM/DL (ref 3.2–4.5)
ALP SERPL-CCNC: 183 U/L (ref 40–136)
ALT SERPL-CCNC: 32 U/L (ref 0–55)
BASOPHILS # BLD AUTO: 0 10^3/UL (ref 0–0.1)
BASOPHILS NFR BLD AUTO: 0 % (ref 0–10)
BILIRUB SERPL-MCNC: 0.3 MG/DL (ref 0.1–1)
BUN/CREAT SERPL: 15
CALCIUM SERPL-MCNC: 8.3 MG/DL (ref 8.5–10.1)
CHLORIDE SERPL-SCNC: 107 MMOL/L (ref 98–107)
CO2 SERPL-SCNC: 20 MMOL/L (ref 21–32)
CREAT SERPL-MCNC: 0.89 MG/DL (ref 0.6–1.3)
EOSINOPHIL # BLD AUTO: 0.2 10^3/UL (ref 0–0.3)
EOSINOPHIL NFR BLD AUTO: 2 % (ref 0–10)
ERYTHROCYTE [DISTWIDTH] IN BLOOD BY AUTOMATED COUNT: 17 % (ref 10–14.5)
GFR SERPLBLD BASED ON 1.73 SQ M-ARVRAT: > 60 ML/MIN
GLUCOSE SERPL-MCNC: 93 MG/DL (ref 70–105)
HCT VFR BLD CALC: 27 % (ref 40–54)
HCT VFR BLD CALC: 28 % (ref 40–54)
HGB BLD-MCNC: 8.8 G/DL (ref 13.3–17.7)
HGB BLD-MCNC: 9.3 G/DL (ref 13.3–17.7)
LYMPHOCYTES # BLD AUTO: 1.7 X 10^3 (ref 1–4)
LYMPHOCYTES NFR BLD AUTO: 16 % (ref 12–44)
MANUAL DIFFERENTIAL PERFORMED BLD QL: NO
MCH RBC QN AUTO: 30 PG (ref 25–34)
MCHC RBC AUTO-ENTMCNC: 33 G/DL (ref 32–36)
MCV RBC AUTO: 90 FL (ref 80–99)
MONOCYTES # BLD AUTO: 1.1 X 10^3 (ref 0–1)
MONOCYTES NFR BLD AUTO: 10 % (ref 0–12)
NEUTROPHILS # BLD AUTO: 7.5 X 10^3 (ref 1.8–7.8)
NEUTROPHILS NFR BLD AUTO: 71 % (ref 42–75)
PLATELET # BLD: 437 10^3/UL (ref 130–400)
PMV BLD AUTO: 9.9 FL (ref 7.4–10.4)
POTASSIUM SERPL-SCNC: 4.4 MMOL/L (ref 3.6–5)
PROT SERPL-MCNC: 4.3 GM/DL (ref 6.4–8.2)
RBC # BLD AUTO: 2.96 10^6/UL (ref 4.35–5.85)
SODIUM SERPL-SCNC: 136 MMOL/L (ref 135–145)
WBC # BLD AUTO: 10.5 10^3/UL (ref 4.3–11)

## 2018-11-29 PROCEDURE — 85027 COMPLETE CBC AUTOMATED: CPT

## 2018-11-29 PROCEDURE — 83605 ASSAY OF LACTIC ACID: CPT

## 2018-11-29 PROCEDURE — 85025 COMPLETE CBC W/AUTO DIFF WBC: CPT

## 2018-11-29 PROCEDURE — 80048 BASIC METABOLIC PNL TOTAL CA: CPT

## 2018-11-29 PROCEDURE — 82962 GLUCOSE BLOOD TEST: CPT

## 2018-11-29 PROCEDURE — 87040 BLOOD CULTURE FOR BACTERIA: CPT

## 2018-11-29 PROCEDURE — 86141 C-REACTIVE PROTEIN HS: CPT

## 2018-11-29 PROCEDURE — 0RGA071 FUSION OF THORACOLUMBAR VERTEBRAL JOINT WITH AUTOLOGOUS TISSUE SUBSTITUTE, POSTERIOR APPROACH, POSTERIOR COLUMN, OPEN APPROACH: ICD-10-PCS | Performed by: ORTHOPAEDIC SURGERY

## 2018-11-29 PROCEDURE — 85018 HEMOGLOBIN: CPT

## 2018-11-29 PROCEDURE — 80202 ASSAY OF VANCOMYCIN: CPT

## 2018-11-29 PROCEDURE — 36415 COLL VENOUS BLD VENIPUNCTURE: CPT

## 2018-11-29 PROCEDURE — 94640 AIRWAY INHALATION TREATMENT: CPT

## 2018-11-29 PROCEDURE — 85652 RBC SED RATE AUTOMATED: CPT

## 2018-11-29 PROCEDURE — 0SP304Z REMOVAL OF INTERNAL FIXATION DEVICE FROM LUMBOSACRAL JOINT, OPEN APPROACH: ICD-10-PCS | Performed by: ORTHOPAEDIC SURGERY

## 2018-11-29 PROCEDURE — 83735 ASSAY OF MAGNESIUM: CPT

## 2018-11-29 PROCEDURE — 86901 BLOOD TYPING SEROLOGIC RH(D): CPT

## 2018-11-29 PROCEDURE — 80053 COMPREHEN METABOLIC PANEL: CPT

## 2018-11-29 PROCEDURE — 87088 URINE BACTERIA CULTURE: CPT

## 2018-11-29 PROCEDURE — 94760 N-INVAS EAR/PLS OXIMETRY 1: CPT

## 2018-11-29 PROCEDURE — 0SG0071 FUSION OF LUMBAR VERTEBRAL JOINT WITH AUTOLOGOUS TISSUE SUBSTITUTE, POSTERIOR APPROACH, POSTERIOR COLUMN, OPEN APPROACH: ICD-10-PCS | Performed by: ORTHOPAEDIC SURGERY

## 2018-11-29 PROCEDURE — 94664 DEMO&/EVAL PT USE INHALER: CPT

## 2018-11-29 PROCEDURE — 85014 HEMATOCRIT: CPT

## 2018-11-29 PROCEDURE — 83540 ASSAY OF IRON: CPT

## 2018-11-29 PROCEDURE — 86920 COMPATIBILITY TEST SPIN: CPT

## 2018-11-29 PROCEDURE — 87186 SC STD MICRODIL/AGAR DIL: CPT

## 2018-11-29 PROCEDURE — 0RG7071 FUSION OF 2 TO 7 THORACIC VERTEBRAL JOINTS WITH AUTOLOGOUS TISSUE SUBSTITUTE, POSTERIOR APPROACH, POSTERIOR COLUMN, OPEN APPROACH: ICD-10-PCS | Performed by: ORTHOPAEDIC SURGERY

## 2018-11-29 PROCEDURE — 86900 BLOOD TYPING SEROLOGIC ABO: CPT

## 2018-11-29 PROCEDURE — 86850 RBC ANTIBODY SCREEN: CPT

## 2018-11-29 PROCEDURE — 87077 CULTURE AEROBIC IDENTIFY: CPT

## 2018-11-29 PROCEDURE — 81000 URINALYSIS NONAUTO W/SCOPE: CPT

## 2018-11-29 PROCEDURE — 84100 ASSAY OF PHOSPHORUS: CPT

## 2018-11-29 PROCEDURE — 85007 BL SMEAR W/DIFF WBC COUNT: CPT

## 2018-11-29 PROCEDURE — 0SP004Z REMOVAL OF INTERNAL FIXATION DEVICE FROM LUMBAR VERTEBRAL JOINT, OPEN APPROACH: ICD-10-PCS | Performed by: ORTHOPAEDIC SURGERY

## 2018-11-29 RX ADMIN — INSULIN ASPART SCH UNIT: 100 INJECTION, SOLUTION INTRAVENOUS; SUBCUTANEOUS at 05:40

## 2018-11-29 RX ADMIN — ACETAMINOPHEN SCH MG: 325 TABLET ORAL at 12:32

## 2018-11-29 RX ADMIN — POLYETHYLENE GLYCOL (3350) SCH GM: 17 POWDER, FOR SOLUTION ORAL at 08:22

## 2018-11-29 RX ADMIN — SODIUM CHLORIDE PRN ML: 900 INJECTION, SOLUTION INTRAVENOUS at 16:50

## 2018-11-29 RX ADMIN — COMPOUNDING SYRUP VEHICLE SCH ML: 1 SYRUP at 00:00

## 2018-11-29 RX ADMIN — ACETAMINOPHEN SCH MG: 325 TABLET ORAL at 05:38

## 2018-11-29 RX ADMIN — METOPROLOL TARTRATE SCH MG: 25 TABLET, FILM COATED ORAL at 08:21

## 2018-11-29 RX ADMIN — COMPOUNDING SYRUP VEHICLE SCH ML: 1 SYRUP at 12:33

## 2018-11-29 RX ADMIN — SODIUM CHLORIDE SCH MLS/HR: 900 INJECTION, SOLUTION INTRAVENOUS at 21:50

## 2018-11-29 RX ADMIN — SODIUM CHLORIDE PRN ML: 900 INJECTION, SOLUTION INTRAVENOUS at 15:59

## 2018-11-29 RX ADMIN — LACTOBACILLUS ACIDOPHILUS / LACTOBACILLUS BULGARICUS SCH GM: 100 MILLION CFU STRENGTH GRANULES at 11:20

## 2018-11-29 RX ADMIN — INSULIN ASPART SCH UNIT: 100 INJECTION, SOLUTION INTRAVENOUS; SUBCUTANEOUS at 11:20

## 2018-11-29 RX ADMIN — LISINOPRIL SCH MG: 20 TABLET ORAL at 08:22

## 2018-11-29 RX ADMIN — SENNOSIDES SCH MG: 8.6 TABLET, FILM COATED ORAL at 22:00

## 2018-11-29 RX ADMIN — DOCUSATE SODIUM SCH MG: 100 CAPSULE ORAL at 22:00

## 2018-11-29 RX ADMIN — AMIODARONE HYDROCHLORIDE SCH MG: 200 TABLET ORAL at 08:22

## 2018-11-29 RX ADMIN — ACETAMINOPHEN SCH MG: 325 TABLET ORAL at 00:00

## 2018-11-29 RX ADMIN — PIOGLITAZONE SCH MG: 30 TABLET ORAL at 05:40

## 2018-11-29 RX ADMIN — AMLODIPINE BESYLATE SCH MG: 5 TABLET ORAL at 08:22

## 2018-11-29 RX ADMIN — COMPOUNDING SYRUP VEHICLE SCH ML: 1 SYRUP at 05:38

## 2018-11-29 RX ADMIN — METFORMIN HYDROCHLORIDE SCH MG: 500 TABLET, FILM COATED ORAL at 05:40

## 2018-11-29 RX ADMIN — PHENAZOPYRIDINE HYDROCHLORIDE SCH MG: 100 TABLET ORAL at 08:21

## 2018-11-29 RX ADMIN — BETHANECHOL CHLORIDE SCH MG: 10 TABLET ORAL at 11:20

## 2018-11-29 RX ADMIN — LACTOBACILLUS ACIDOPHILUS / LACTOBACILLUS BULGARICUS SCH GM: 100 MILLION CFU STRENGTH GRANULES at 05:38

## 2018-11-29 RX ADMIN — PHENAZOPYRIDINE HYDROCHLORIDE SCH MG: 100 TABLET ORAL at 13:02

## 2018-11-29 RX ADMIN — BETHANECHOL CHLORIDE SCH MG: 10 TABLET ORAL at 05:39

## 2018-11-29 RX ADMIN — ALLOPURINOL SCH MG: 300 TABLET ORAL at 08:22

## 2018-11-29 RX ADMIN — ATORVASTATIN CALCIUM SCH MG: 10 TABLET, FILM COATED ORAL at 08:22

## 2018-11-29 RX ADMIN — FAMOTIDINE SCH MG: 20 TABLET, FILM COATED ORAL at 22:00

## 2018-11-29 RX ADMIN — DOCUSATE SODIUM AND SENNOSIDES SCH EA: 8.6; 5 TABLET, FILM COATED ORAL at 08:22

## 2018-11-29 NOTE — PROGRESS NOTE-POST OPERATIVE
Post-Operative Progess Note


Surgeon (s)/Assistant (s)


Surgeon


ENZO DONALDSON MD


Assistant:  Jose Gomez, JOIE





Pre-Operative Diagnosis


Lumbar Hardware Failure and wound infection





Post-Operative Diagnosis





Same





Procedure & Operative Findings


Date of Procedure


11/29/18


Procedure Performed/Findings


T10-Pelvis PSF with revision debridement


Anesthesia Type


GETA





Estimated Blood Loss


Estimated blood loss (mL):  500





Specimens/Packing


Specimens Removed


None











ENZO DONALDSON MD Nov 29, 2018 4:28 pm

## 2018-11-29 NOTE — PROGRESS NOTE-PRE OPERATIVE
Pre-Operative Progress Note


H&P Reviewed


The H&P was reviewed, patient examined and no changes noted.


Date Seen by Provider:  Nov 29, 2018


Time Seen by Provider:  13:48


Date H&P Reviewed:  Nov 29, 2018


Time H&P Reviewed:  13:48


Pre-Operative Diagnosis:  Hardware failure, lumbar wound infection.











ENZO DONALDSON MD Nov 29, 2018 1:56 pm

## 2018-11-29 NOTE — PROGRESS NOTE-HOSPITALIST
Subjective


HPI/CC On Admission


Date Seen by Provider:  Nov 29, 2018


Time Seen by Provider:  09:45


Subjective/Events-last exam


Patient will have his hardware replaced today at 1 p.m. by Dr. Bustos


Resting comfortably on his left side


Wife at bedside and appears to be discouraged but I tried to reassure


Stools are not diarrhea status


Pain is well-controlled





Review of Systems


Musculoskeletal:  back pain





Objective


Exam


Vital Signs





Vital Signs








  Date Time  Temp Pulse Resp B/P (MAP) Pulse Ox O2 Delivery O2 Flow Rate FiO2


 


11/29/18 08:33      Room Air  


 


11/29/18 06:00 98.9 77 21 145/69 (94) 94   





Capillary Refill :


General Appearance:  No Apparent Distress, WD/WN, Chronically ill


Respiratory:  Chest Non Tender, Lungs Clear, Normal Breath Sounds, No Accessory 

Muscle Use, No Respiratory Distress


Cardiovascular:  Regular Rate, Rhythm, No Edema, No Gallop, No JVD, No Murmur, 

Normal Peripheral Pulses


Skin:  Normal Color, Warm/Dry





Results/Procedures


Lab


Laboratory Tests


11/29/18 05:50








Patient resulted labs reviewed.





Assessment/Plan


Assessment and Plan


Assess & Plan/Chief Complaint


Assessment:


Recurrent severe back pain with referred lower abdominal pain similar to 

initial presentation obtained labs and CT scan and Dr Bustos consultation which 

revealed displaced hardware in need of repeat surgery today 11/29/18


Debility following spinal abscess status post 2 I&D's uncomplicated per Dr Bustos


Urinary retention acute maintained with panda but Dr Tawil consulted


C. difficile colitis improved after constipation for 3 days now BM 2 days ago 

after Miralax and DC Questran


Severe anemia due to acute blood loss status post 4 units of blood while on 

MedSurg and ICU


CAD


DM


CRI





Plan:


Hardware modification today


Panda cath and consult Dr Tawil is appreciated


Urecholine and Pyridium to be maintained


Limit pain meds


Miralax and senna


Hold Questran


Vanc PO


Check labs in am


Appreciate Dr Bustos





Diagnosis/Problems


Diagnosis/Problems





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


Status:  Acute


(2) Intraspinal abscess and granuloma


Status:  Resolved


Resolution Date/Time:  11/24/18 @ 14:02


(3) C. difficile colitis


Status:  Acute


(4) Renal insufficiency


Status:  Resolved


Resolution Date/Time:  11/19/18 @ 09:41


(5) Leukocytosis


Status:  Resolved


Resolution Date/Time:  11/24/18 @ 14:02


(6) Transfusion of blood during current hospitalization


Status:  Resolved


Resolution Date/Time:  11/24/18 @ 14:02


(7) Advanced age


Status:  Chronic


(8) CAD (coronary artery disease)


Status:  Chronic


Qualifiers:  


   Coronary Disease-Associated Artery/Lesion type:  native artery  Native vs. 

transplanted heart:  native heart  Associated angina:  without angina  

Qualified Codes:  I25.10 - Atherosclerotic heart disease of native coronary 

artery without angina pectoris


(9) Atrial fibrillation


Status:  Chronic


Qualifiers:  


   Atrial fibrillation type:  paroxysmal  Qualified Codes:  I48.0 - Paroxysmal 

atrial fibrillation


(10) Hx of CABG


Status:  Chronic


(11) Urinary retention


Status:  Acute





Clinical Quality Measures


DVT/VTE Risk/Contraindication:


RFS Level Per Nursing on Admit:  4+=Very High











LISA QUIÑONES DO Nov 29, 2018 10:24

## 2018-11-29 NOTE — DIAGNOSTIC IMAGING REPORT
INDICATION: None given.



EXAMINATION: Intraoperative fluoroscopy used during lumbar fusion

per Dr. Bustos.



FINDINGS: There are pedicle screws seen from L4-S1 with Fusion of

the SI joints on both sides. There are disc prostheses at L4-L5

and L5-S1. There are also pedicle screws visualized at the

thoracolumbar junction. 



FLUOROSCOPY TIME: 23 seconds of fluoroscopy time was used in

surgery.



IMPRESSION: Intraoperative fluoroscopy was used during fusion of

thoracolumbar spine and lumbosacral spine per Dr. Bustos. 



Dictated by: 



  Dictated on workstation # [id number]

## 2018-11-29 NOTE — PM & R (SOAP) PROGRESS NOTE
Subjective


This was a face to face visit with the patient.


Date Seen by Provider:  Nov 29, 2018


Time Seen by Provider:  07:45


Subjective/Events-last exam


Patient was seen in his room this AM Informed by Nursing that patient will be 

discharged from rehab and then to redo spinal surgery with DR Bustos this 

afternoon with transfer to ICU or Med/surg floor postop Discharge orders 

completed


Date Identified:  Nov 29, 2018


Time Identified:  10:45


Medication Intervention:  


Transfer meds reviewed


Review of Systems


Musculoskeletal:  back pain, leg pain





Objective


Physician Exam


Last Set of Vital Signs





Vital Signs








  Date Time  Temp Pulse Resp B/P (MAP) Pulse Ox O2 Delivery O2 Flow Rate FiO2


 


11/29/18 08:33      Room Air  


 


11/29/18 06:00 98.9 77 21 145/69 (94) 94   





Capillary Refill :


I&O











Intake and Output 


 


 11/29/18





 00:00


 


Intake Total 920 ml


 


Output Total 1100 ml


 


Balance -180 ml


 


 


 


Intake Oral 920 ml


 


Output Urine Total 1100 ml








General:  Alert, Cooperative, No Acute Distress


HEENT:  Atraumatic, PERRLA, EOMI, Mucous Memb Moist/Pink, Other ([facility] has 

hearing aids but out for the evening)


Neck:  Supple, No JVD


Lungs:  Clear to Auscultation


Heart:  Regular Rate


Abdomen:  Normal Bowel Sounds, Soft, No Tenderness


Extremities:  No Edema


Skin:  Other (has wound vac over incision)


Neuro:  Other (cognitive impairment with memory strength 4/5 uppers 3+/5 lowers 

sensation intact)


Psych/Mental Status:  Other (Mild memory impairment)





Results


Lab Data


Laboratory Tests


11/26/18 11:06: Glucometer 142H


11/26/18 16:12: Glucometer 177H


11/26/18 19:30: Vancomycin Level Trough 18.1


11/26/18 20:35: Glucometer 136H


11/27/18 05:26: Glucometer 130H


11/27/18 11:50: 


White Blood Count 11.6H, Red Blood Count 3.05L, Hemoglobin 9.2L, Hematocrit 27L

, Mean Corpuscular Volume 90, Mean Corpuscular Hemoglobin 30, Mean Corpuscular 

Hemoglobin Concent 34, Red Cell Distribution Width 17.1H, Platelet Count 410H, 

Mean Platelet Volume 9.6, Neutrophils (%) (Auto) 79H, Lymphocytes (%) (Auto) 10L

, Monocytes (%) (Auto) 9, Eosinophils (%) (Auto) 1, Basophils (%) (Auto) 0, 

Neutrophils # (Auto) 9.2H, Lymphocytes # (Auto) 1.2, Monocytes # (Auto) 1.1H, 

Eosinophils # (Auto) 0.1, Basophils # (Auto) 0.0, Erythrocyte Sedimentation 

Rate 30, Sodium Level 134L, Potassium Level 4.3, Chloride Level 105, Carbon 

Dioxide Level 21, Anion Gap 8, Blood Urea Nitrogen 10, Creatinine 0.81, Estimat 

Glomerular Filtration Rate > 60, BUN/Creatinine Ratio 12, Glucose Level 147H, 

Calcium Level 8.7, Corrected Calcium 10.0, Total Bilirubin 0.4, Aspartate Amino 

Transf (AST/SGOT) 44H, Alanine Aminotransferase (ALT/SGPT) 38, Alkaline 

Phosphatase 179H, C-Reactive Protein High Sensitivity 4.54H, Total Protein 4.6L

, Albumin 2.4L


11/27/18 16:09: Glucometer 190H


11/27/18 21:00: Glucometer 144H


11/28/18 06:25: 


White Blood Count 11.2H, Red Blood Count 2.97L, Hemoglobin 8.7L, Hematocrit 27L

, Mean Corpuscular Volume 90, Mean Corpuscular Hemoglobin 29, Mean Corpuscular 

Hemoglobin Concent 33, Red Cell Distribution Width 17.0H, Platelet Count 431H, 

Mean Platelet Volume 9.7, Neutrophils (%) (Auto) 70, Lymphocytes (%) (Auto) 17, 

Monocytes (%) (Auto) 10, Eosinophils (%) (Auto) 3, Basophils (%) (Auto) 0, 

Neutrophils # (Auto) 7.9H, Lymphocytes # (Auto) 1.9, Monocytes # (Auto) 1.1H, 

Eosinophils # (Auto) 0.3, Basophils # (Auto) 0.0, Sodium Level 135, Potassium 

Level 4.0, Chloride Level 106, Carbon Dioxide Level 21, Anion Gap 8, Blood Urea 

Nitrogen 10, Creatinine 0.82, Estimat Glomerular Filtration Rate > 60, BUN/

Creatinine Ratio 12, Glucose Level 100, Calcium Level 8.5, Corrected Calcium 9.9

, Total Bilirubin 0.4, Aspartate Amino Transf (AST/SGOT) 42H, Alanine 

Aminotransferase (ALT/SGPT) 33, Alkaline Phosphatase 169H, Total Protein 4.4L, 

Albumin 2.3L


11/28/18 11:47: Glucometer 115H


11/28/18 12:00: Iron Level 22L


11/28/18 16:21: Glucometer 85


11/29/18 05:50: 


White Blood Count 10.5, Red Blood Count 2.96L, Hemoglobin 8.8L, Hematocrit 27L, 

Mean Corpuscular Volume 90, Mean Corpuscular Hemoglobin 30, Mean Corpuscular 

Hemoglobin Concent 33, Red Cell Distribution Width 17.0H, Platelet Count 437H, 

Mean Platelet Volume 9.9, Neutrophils (%) (Auto) 71, Lymphocytes (%) (Auto) 16, 

Monocytes (%) (Auto) 10, Eosinophils (%) (Auto) 2, Basophils (%) (Auto) 0, 

Neutrophils # (Auto) 7.5, Lymphocytes # (Auto) 1.7, Monocytes # (Auto) 1.1H, 

Eosinophils # (Auto) 0.2, Basophils # (Auto) 0.0, Sodium Level 136, Potassium 

Level 4.4, Chloride Level 107, Carbon Dioxide Level 20L, Anion Gap 9, Blood 

Urea Nitrogen 13, Creatinine 0.89, Estimat Glomerular Filtration Rate > 60, BUN/

Creatinine Ratio 15, Glucose Level 93, Calcium Level 8.3L, Corrected Calcium 9.7

, Total Bilirubin 0.3, Aspartate Amino Transf (AST/SGOT) 44H, Alanine 

Aminotransferase (ALT/SGPT) 32, Alkaline Phosphatase 183H, Total Protein 4.3L, 

Albumin 2.2L





Assessment/Plan


Assessment and Plan


Discharge from IRU as per above


Will f/u with staff postop re disposition


May require SNU placement


See orders


Co-Morbidities that are continuing to impact the rehab process: (include details

)











CJ BROCK MD Nov 29, 2018 10:51

## 2018-11-29 NOTE — THERAPY TEAM DISCHARGE SUMMARY
Therapy Discharge Summary


Discharge Recommendations


Date of Discharge


11/29/2018


Therapy D/C Recommendations:  24 hr Supervision





Physical Therapy


This patient was transferred to ARU from acute care post surgical intervention 

to replace hardware in his back due to sepsis and spinal abscess.  Prior to 

acute hospital stay, he was mod indep with all mobility using a fWW.  Upon 

admit to ARU, he was mod assist with transfers and walked 10 ft with FWW with 

CGA.  Treatment was limited as pt began having progressive pain that limited 

his abilty to participate with skilled therapy.  Treatment did consist of LE 

strength, functional transfers and gait.  However, at last visit he was nearly 

dependent for all mobility due to pain and it was found that the hardware had 

failed.  He is to discharge this date and return to surgery for repair.  DC PT.





Occupational Therapy


Decreased Activ Tolerance, Decreased UE Strength, Dependent Transfers, Impaired 

Bed Mobility, Impaired Funct Balance, Impaired I ADL's, Impaired Self-Care 

Skills, Restricted Funct UE ROM





PT Long Term Goals


Long Term Goals


PT Long Term Goals Time Frame:  Dec 14, 2018


Transfers (B,C,W/C) (FIM):  5


Roll Left to Right (QC):  4


Sit to Lying (QC):  4


Lying-Sitting on Side/Bed(QC):  4


Sit to Stand (QC):  4


Chair/Bed-to-Chair Xfer(QC):  4


Car Transfer (QC):  4


Gait (FIM):  5


Distance:  150'


Walk 10 feet (QC):  4


Walk 10ft-Uneven Surface(QC):  4


Walk 50ft with 2 Turns (QC):  4


Walk 150 ft (QC):  4


Gait Level of Assist:  5


Gait Assistive Device:  FWW


Stairs (FIM):  2


# of Steps:  4


1 Step (curb) (QC):  4


4 Steps (QC):  4


Stairs Level Of Assist:  4


No goals met





OT Long Term Goals


Long Term Goals


Time Frame:  Dec 14, 2018


Eating (FIM):  7


Eating (QC):  6


Oral Hygiene (QC):  6


Grooming(FIM):  6


Bathing(FIM):  6


Shower/Bathe Self (QC):  6


Upper Body Dressing(FIM):  6


Upper Body Dressing (QC):  6


Lower Body Dressing(FIM):  6


Lower Body Dressing (QC):  6


On/Off Footwear (QC):  6


Toileting(FIM):  6


Toileting Hygiene (QC):  6


Toilet/Commode Transfer(FIM):  6


Toilet/Commode Transfer (QC):  6


Shower Transfer(FIM):  5 (if allowed per wound vac)


Additional Goals:  1-Demonstrate ADL Tasks, 2-Verbalize Understanding, 3-

ImproveStrength/Samina


1=Demonstrate adherence to instructed precautions during ADL tasks.


2=Patient will verbalize/demonstrate understanding of assistive devices/

modifications for ADL.


3=Patient will improve strength/tolerance for activity to enable patient to 

perform ADL's.





Speech Long Term Goals


Long Term Goals


Patient will improve memory and problem solving tasks with minimal cues for 

increased safety and independence at 90% accuracy.











QUINTEN ALLEN PT Nov 29, 2018 09:30

## 2018-11-29 NOTE — OCC THERAPY PROGRESS NOTE
Therapy Progress Note


Late entry for 11/28/18    Pt on hold pending surgery on back on 11/29/18.











FERNANDO SMITH OT Nov 29, 2018 08:34

## 2018-11-29 NOTE — PROGRESS NOTE-PRE OPERATIVE
Pre-Operative Progress Note


H&P Reviewed


The H&P was reviewed, patient examined and no changes noted.


Time Seen by Provider:  13:48


Date H&P Reviewed:  Nov 29, 2018


Time H&P Reviewed:  13:48


Pre-Operative Diagnosis:  Lumbar Hardware Failure and wound infection











ENZO DONALDSON MD Nov 29, 2018 1:57 pm

## 2018-11-29 NOTE — THERAPY TEAM DISCHARGE SUMMARY
Therapy Discharge Summary


Discharge Recommendations


Date of Discharge





Therapy D/C Recommendations:  Other, See Comments





Occupational Therapy


Decreased Activ Tolerance, Decreased UE Strength, Dependent Transfers, Impaired 

Bed Mobility, Impaired Funct Balance, Impaired I ADL's, Impaired Self-Care 

Skills, Restricted Funct UE ROM





Speech-Language Pathology


Patient was seen initially due to back surgeries and his admit to the rehab 

unit. At that time he was evaluated for cognitive function. It was determined 

based on his scores that he had deficits in memory and problem solving. He was 

seen for 2 visits with focus on memory and problem solving related to his 

safety and independence. Due to decreased skilled ST days minimal progress was 

made. He is being discharged due to being scheduled for another back surgery.





PT Long Term Goals


Long Term Goals


PT Long Term Goals Time Frame:  Dec 14, 2018


Transfers (B,C,W/C) (FIM):  5


Roll Left to Right (QC):  4


Sit to Lying (QC):  4


Lying-Sitting on Side/Bed(QC):  4


Sit to Stand (QC):  4


Chair/Bed-to-Chair Xfer(QC):  4


Car Transfer (QC):  4


Gait (FIM):  5


Distance:  150'


Walk 10 feet (QC):  4


Walk 10ft-Uneven Surface(QC):  4


Walk 50ft with 2 Turns (QC):  4


Walk 150 ft (QC):  4


Gait Level of Assist:  5


Gait Assistive Device:  FWW


Stairs (FIM):  2


# of Steps:  4


1 Step (curb) (QC):  4


4 Steps (QC):  4


Stairs Level Of Assist:  4





OT Long Term Goals


Long Term Goals


Time Frame:  Dec 14, 2018


Eating (FIM):  7 (not met)


Eating (QC):  6 (not met)


Oral Hygiene (QC):  6 (not met)


Grooming(FIM):  6 (not met)


Bathing(FIM):  6 (not met)


Shower/Bathe Self (QC):  6 (not met)


Upper Body Dressing(FIM):  6 (not met)


Upper Body Dressing (QC):  6 (not met)


Lower Body Dressing(FIM):  6 (not met)


Lower Body Dressing (QC):  6 (not met)


On/Off Footwear (QC):  6 (not met)


Toileting(FIM):  6 (not met)


Toileting Hygiene (QC):  6 (not met)


Toilet/Commode Transfer(FIM):  6 (not met)


Toilet/Commode Transfer (QC):  6 (not met)


Shower Transfer(FIM):  5 (if allowed per wound vac)


Additional Goals:  1-Demonstrate ADL Tasks, 2-Verbalize Understanding, 3-

ImproveStrength/Samina


1=Demonstrate adherence to instructed precautions during ADL tasks.


2=Patient will verbalize/demonstrate understanding of assistive devices/

modifications for ADL.


3=Patient will improve strength/tolerance for activity to enable patient to 

perform ADL's.





Speech Long Term Goals


Long Term Goals


Patient will improve memory and problem solving tasks with minimal cues for 

increased safety and independence at 90% accuracy. Goal not met. Completed at 60

% accuracy.











SAURABH VALLE Nov 29, 2018 12:27

## 2018-11-29 NOTE — CARDIOLOGY PROGRESS NOTE
Cardiology SOAP Progress Note


Subjective:


No cardiac complaints.





Objective:


I&O/Vital Signs











 11/29/18 11/29/18





 06:00 08:33


 


Temp 98.9 


 


Pulse 77 


 


Resp 21 


 


B/P (MAP) 145/69 (94) 


 


Pulse Ox 94 


 


O2 Delivery Room Air Room Air














 11/29/18





 00:00


 


Intake Total 720 ml


 


Output Total 450 ml


 


Balance 270 ml








Weight (Pounds):  193


Weight (Ounces):  8.0


Weight (Calculated Kilograms):  87.523750


Constitutional:  No appears stated age; AAO x 3; No apparent distress, No PERRL

, No well-developed, No well-nourished, No other


Respiratory:  No accessory muscle use, No respiratory distress, No chest tender

, No chest expansion is symmetric; chest is bilaterally symmetric; No lungs 

clear to percussion; lungs clear to auscultation; No crackles, No rhonchi, No 

rales, No stridor, No wheezing, No pleural rub, No other


Cardiovascular:  regular rate-rhythm; No irregularly irregular, No extra beats, 

No parasternal heave is noted, No JVD, No edema, No bradycardia, No tachycardia

, No point of maximal impulse, No cardiac thrills are palpable; S1 and S2; No 

gallop/S3, No gallop/S4, No diastolic murmur, No systolic murmur, No friction 

rub, No click, No other


Gastrointestional:  No tender, No soft, No round, No distended, No pulsatile 

mass, No organomegaly, No guarding, No rebound, No tenderness, No hernia, No 

mass, No audible bowel sounds, No abnormal bowel sounds, No abdominal bruits, 

No spleenomegaly, No other


Extremities:  No normal range of motion, No non-tender, No normal inspection, 

No pedal edema, No calf tenderness, No normal capillary refill, No pelvis stable

, No calf tenderness, No inflammation, No pedal edema, No slow capillary refill

, No swelling, No other, No abrasion, No clubbing, No cyanosis, No ecchymosis, 

No laceration, No no lower extremity edema bilateral, No significant edema, No 

tenderness, No wound


Neurologic/Psychiatric:  no motor/sensory deficits, alert, normal mood/affect, 

oriented x 3


Skin:  No normal color, No warm/dry, No cyanosis, No cool, No diaphoresis, No 

damp, No ecchymosis, No jaundice, No mottled, No pallor, No rash, No tattoos/

piercings, No ulcerations, No rash on exposed areas, No ulcerations on exposed 

areas, No other





Results/Procedures:


Labs


Laboratory Tests


11/28/18 16:21: Glucometer 85


11/29/18 05:50: 


White Blood Count 10.5, Red Blood Count 2.96L, Hemoglobin 8.8L, Hematocrit 27L, 

Mean Corpuscular Volume 90, Mean Corpuscular Hemoglobin 30, Mean Corpuscular 

Hemoglobin Concent 33, Red Cell Distribution Width 17.0H, Platelet Count 437H, 

Mean Platelet Volume 9.9, Neutrophils (%) (Auto) 71, Lymphocytes (%) (Auto) 16, 

Monocytes (%) (Auto) 10, Eosinophils (%) (Auto) 2, Basophils (%) (Auto) 0, 

Neutrophils # (Auto) 7.5, Lymphocytes # (Auto) 1.7, Monocytes # (Auto) 1.1H, 

Eosinophils # (Auto) 0.2, Basophils # (Auto) 0.0, Sodium Level 136, Potassium 

Level 4.4, Chloride Level 107, Carbon Dioxide Level 20L, Anion Gap 9, Blood 

Urea Nitrogen 13, Creatinine 0.89, Estimat Glomerular Filtration Rate > 60, BUN/

Creatinine Ratio 15, Glucose Level 93, Calcium Level 8.3L, Corrected Calcium 9.7

, Total Bilirubin 0.3, Aspartate Amino Transf (AST/SGOT) 44H, Alanine 

Aminotransferase (ALT/SGPT) 32, Alkaline Phosphatase 183H, Total Protein 4.3L, 

Albumin 2.2L


11/29/18 10:58: Glucometer 106








A/P:


Assessment/Dx:


Assessment/Dx:


Spinal abscess,


Sepsis,


UTI,


History of CAD, CABG,


Atrial fibrillation on amiodarone.


LVH,


Diastolic dysfunction.


Plan:





Spinal abscessstatus post I&D.  Still complains of back discomfort.  Dr. Lucas aware.  Reoperation today.


Sepsiscontinue broad-spectrum IV antibiotics as per Dr. Pro.  C. difficile, 

on contact precautions.


Paroxysmal atrial fibrillationcurrently in sinus rhythm.  Continue amiodarone.

  I might consider discontinuing amiodarone but since the patient follows Dr. Eckert in [place] I may defer that decision to him.


CAD/history of CABGcontinue lisinopril and statin therapy.  Aspirin low dose 

once okay with Dr. Pro and Dr. Bustos.  Will add low dose beta blocker for 

atrial fibrillation and CAD.


LVH,


Diastolic dysfunction,


Echocardiogram showed normal LV function.


Patient follows with Dr. Eckert in [place].








Thank you for your consultation. Please call me if you have any questions.








KENDALL Zhu MD, FACP, FACC, FSCAI, FHRS, CCDS


Interventional Cardiology


Cardiac Electrophysiology


Vascular Medicine and Endovascular Interventions











MARCIN ZHU MD Nov 29, 2018 12:25

## 2018-11-29 NOTE — THERAPY TEAM DISCHARGE SUMMARY
Therapy Discharge Summary


Discharge Recommendations


Date of Discharge


11-29-18


Therapy D/C Recommendations:  Other, See Comments





Occupational Therapy


Pt. has been seen by occupational therapy to increase overall strength and 

independence with daily tasks.  Pt. is having surgery this date for change in 

medical status.  Will discharge OT services at this time pending new orders.  

No goals met at this time.


Decreased Activ Tolerance, Decreased UE Strength, Dependent Transfers, Impaired 

Bed Mobility, Impaired Funct Balance, Impaired I ADL's, Impaired Self-Care 

Skills, Restricted Funct UE ROM





PT Long Term Goals


Long Term Goals


PT Long Term Goals Time Frame:  Dec 14, 2018


Transfers (B,C,W/C) (FIM):  5


Roll Left to Right (QC):  4


Sit to Lying (QC):  4


Lying-Sitting on Side/Bed(QC):  4


Sit to Stand (QC):  4


Chair/Bed-to-Chair Xfer(QC):  4


Car Transfer (QC):  4


Gait (FIM):  5


Distance:  150'


Walk 10 feet (QC):  4


Walk 10ft-Uneven Surface(QC):  4


Walk 50ft with 2 Turns (QC):  4


Walk 150 ft (QC):  4


Gait Level of Assist:  5


Gait Assistive Device:  FWW


Stairs (FIM):  2


# of Steps:  4


1 Step (curb) (QC):  4


4 Steps (QC):  4


Stairs Level Of Assist:  4





OT Long Term Goals


Long Term Goals


Time Frame:  Dec 14, 2018


Eating (FIM):  7 (not met)


Eating (QC):  6 (not met)


Oral Hygiene (QC):  6 (not met)


Grooming(FIM):  6 (not met)


Bathing(FIM):  6 (not met)


Shower/Bathe Self (QC):  6 (not met)


Upper Body Dressing(FIM):  6 (not met)


Upper Body Dressing (QC):  6 (not met)


Lower Body Dressing(FIM):  6 (not met)


Lower Body Dressing (QC):  6 (not met)


On/Off Footwear (QC):  6 (not met)


Toileting(FIM):  6 (not met)


Toileting Hygiene (QC):  6 (not met)


Toilet/Commode Transfer(FIM):  6 (not met)


Toilet/Commode Transfer (QC):  6 (not met)


Shower Transfer(FIM):  5 (if allowed per wound vac)


Additional Goals:  1-Demonstrate ADL Tasks, 2-Verbalize Understanding, 3-

ImproveStrength/Samina


1=Demonstrate adherence to instructed precautions during ADL tasks.


2=Patient will verbalize/demonstrate understanding of assistive devices/

modifications for ADL.


3=Patient will improve strength/tolerance for activity to enable patient to 

perform ADL's.





Speech Long Term Goals


Long Term Goals


Patient will improve memory and problem solving tasks with minimal cues for 

increased safety and independence at 90% accuracy.











JOSE MOSS OT Nov 29, 2018 10:51

## 2018-11-30 VITALS — DIASTOLIC BLOOD PRESSURE: 62 MMHG | SYSTOLIC BLOOD PRESSURE: 117 MMHG

## 2018-11-30 VITALS — DIASTOLIC BLOOD PRESSURE: 37 MMHG | SYSTOLIC BLOOD PRESSURE: 116 MMHG

## 2018-11-30 VITALS — SYSTOLIC BLOOD PRESSURE: 110 MMHG | DIASTOLIC BLOOD PRESSURE: 42 MMHG

## 2018-11-30 VITALS — SYSTOLIC BLOOD PRESSURE: 135 MMHG | DIASTOLIC BLOOD PRESSURE: 57 MMHG

## 2018-11-30 VITALS — DIASTOLIC BLOOD PRESSURE: 51 MMHG | SYSTOLIC BLOOD PRESSURE: 122 MMHG

## 2018-11-30 VITALS — SYSTOLIC BLOOD PRESSURE: 114 MMHG | DIASTOLIC BLOOD PRESSURE: 34 MMHG

## 2018-11-30 VITALS — DIASTOLIC BLOOD PRESSURE: 53 MMHG | SYSTOLIC BLOOD PRESSURE: 125 MMHG

## 2018-11-30 VITALS — SYSTOLIC BLOOD PRESSURE: 118 MMHG | DIASTOLIC BLOOD PRESSURE: 50 MMHG

## 2018-11-30 VITALS — DIASTOLIC BLOOD PRESSURE: 42 MMHG | SYSTOLIC BLOOD PRESSURE: 111 MMHG

## 2018-11-30 VITALS — SYSTOLIC BLOOD PRESSURE: 117 MMHG | DIASTOLIC BLOOD PRESSURE: 40 MMHG

## 2018-11-30 VITALS — DIASTOLIC BLOOD PRESSURE: 45 MMHG | SYSTOLIC BLOOD PRESSURE: 106 MMHG

## 2018-11-30 VITALS — SYSTOLIC BLOOD PRESSURE: 109 MMHG | DIASTOLIC BLOOD PRESSURE: 48 MMHG

## 2018-11-30 VITALS — SYSTOLIC BLOOD PRESSURE: 113 MMHG | DIASTOLIC BLOOD PRESSURE: 37 MMHG

## 2018-11-30 VITALS — SYSTOLIC BLOOD PRESSURE: 126 MMHG | DIASTOLIC BLOOD PRESSURE: 54 MMHG

## 2018-11-30 VITALS — DIASTOLIC BLOOD PRESSURE: 57 MMHG | SYSTOLIC BLOOD PRESSURE: 127 MMHG

## 2018-11-30 VITALS — SYSTOLIC BLOOD PRESSURE: 133 MMHG | DIASTOLIC BLOOD PRESSURE: 76 MMHG

## 2018-11-30 VITALS — SYSTOLIC BLOOD PRESSURE: 135 MMHG | DIASTOLIC BLOOD PRESSURE: 81 MMHG

## 2018-11-30 VITALS — SYSTOLIC BLOOD PRESSURE: 121 MMHG | DIASTOLIC BLOOD PRESSURE: 48 MMHG

## 2018-11-30 VITALS — SYSTOLIC BLOOD PRESSURE: 122 MMHG | DIASTOLIC BLOOD PRESSURE: 48 MMHG

## 2018-11-30 VITALS — SYSTOLIC BLOOD PRESSURE: 114 MMHG | DIASTOLIC BLOOD PRESSURE: 42 MMHG

## 2018-11-30 LAB
ALBUMIN SERPL-MCNC: 2.2 GM/DL (ref 3.2–4.5)
ALP SERPL-CCNC: 155 U/L (ref 40–136)
ALT SERPL-CCNC: 37 U/L (ref 0–55)
BILIRUB SERPL-MCNC: 0.4 MG/DL (ref 0.1–1)
BUN/CREAT SERPL: 14
CALCIUM SERPL-MCNC: 7.8 MG/DL (ref 8.5–10.1)
CHLORIDE SERPL-SCNC: 108 MMOL/L (ref 98–107)
CO2 SERPL-SCNC: 16 MMOL/L (ref 21–32)
CREAT SERPL-MCNC: 1.11 MG/DL (ref 0.6–1.3)
ERYTHROCYTE [DISTWIDTH] IN BLOOD BY AUTOMATED COUNT: 17 % (ref 10–14.5)
GFR SERPLBLD BASED ON 1.73 SQ M-ARVRAT: > 60 ML/MIN
GLUCOSE SERPL-MCNC: 150 MG/DL (ref 70–105)
HCT VFR BLD CALC: 27 % (ref 40–54)
HGB BLD-MCNC: 8.9 G/DL (ref 13.3–17.7)
MCH RBC QN AUTO: 30 PG (ref 25–34)
MCHC RBC AUTO-ENTMCNC: 33 G/DL (ref 32–36)
MCV RBC AUTO: 90 FL (ref 80–99)
PLATELET # BLD: 409 10^3/UL (ref 130–400)
PMV BLD AUTO: 9.8 FL (ref 7.4–10.4)
POTASSIUM SERPL-SCNC: 4.9 MMOL/L (ref 3.6–5)
PROT SERPL-MCNC: 4.3 GM/DL (ref 6.4–8.2)
RBC # BLD AUTO: 2.99 10^6/UL (ref 4.35–5.85)
SODIUM SERPL-SCNC: 135 MMOL/L (ref 135–145)
WBC # BLD AUTO: 16.9 10^3/UL (ref 4.3–11)

## 2018-11-30 RX ADMIN — SODIUM CHLORIDE SCH MLS/HR: 900 INJECTION, SOLUTION INTRAVENOUS at 11:50

## 2018-11-30 RX ADMIN — FAMOTIDINE SCH MG: 20 TABLET, FILM COATED ORAL at 08:33

## 2018-11-30 RX ADMIN — FAMOTIDINE SCH MG: 20 TABLET, FILM COATED ORAL at 20:42

## 2018-11-30 RX ADMIN — SENNOSIDES SCH MG: 8.6 TABLET, FILM COATED ORAL at 20:42

## 2018-11-30 RX ADMIN — SENNOSIDES SCH MG: 8.6 TABLET, FILM COATED ORAL at 08:32

## 2018-11-30 RX ADMIN — Medication SCH EA: at 06:00

## 2018-11-30 RX ADMIN — SODIUM CHLORIDE SCH MLS/HR: 900 INJECTION, SOLUTION INTRAVENOUS at 20:42

## 2018-11-30 RX ADMIN — DOCUSATE SODIUM SCH MG: 100 CAPSULE ORAL at 08:32

## 2018-11-30 RX ADMIN — DOCUSATE SODIUM SCH MG: 100 CAPSULE ORAL at 20:42

## 2018-11-30 NOTE — CONSULTATION-HOSPITALIST
LISA QUIÑONES DO 18 1155:


HPI


History of Present Illness:


HPI/Chief Complaint


CC: s/p spinal hardware failure replacement





HPI: This is an 78-year-old white male who was transferred from inpatient 

rehabilitation department to ICU for close monitoring following spinal hardware 

failure replacement yesterday.  He had an uncomplicated surgery and is 

currently recovering and doing well overall.  Urinary output has decreased and 

blood pressure is 115 so will initiate low flow IV fluid of normal saline and 

encouraged him to take in nutrition and fluids today.  He will be fitted for a 

shell back brace and then we will get him out of bed.  Overall patient is doing 

much better wife was updated and patient feels better overall.


Source:  patient


Exam Limitations:  no limitations


Date Seen


18


Attending Physician


Wilder Bustos MD


PCP


No,Local Physician


Referring Physician





Date of Admission








Home Medications & Allergies


Home Medications


Reviewed patient Home Medication Reconciliation performed by pharmacy 

medication reconciliations technician and/or nursing.


Patients Allergies have been reviewed.





Allergies





Allergies


Coded Allergies


  cephalexin (Verified Allergy, Unknown, 18)


  hydrocodone (Verified Allergy, Unknown, 18)








Past Medical-Social-Family Hx


Past Med/Social Hx:  Reviewed Nursing Past Med/Soc Hx, Reviewed and Corrections 

made


Patient Social History


Marrital Status:  


Employed/Student:  retired


Alcohol Use:  Rarely Uses


Number of Drinks Today:  0


Alcohol Beverage of Choice:  Wine


Recreational Drug Use:  No


Smoking Status:  Never a Smoker


Physical Abuse Screen:  No


Sexual Abuse:  No


Recent Foreign Travel:  No


Contact w/other who traveled:  No


Recent Hopitalizations:  Yes


Recent Infectious Disease Expo:  No





Immunizations Up To Date


Pediatric:  Yes


Date of Pneumonia Vaccine:  Oct 15, 2017


Date of Influenza Vaccine:  Oct 1, 2018





Seasonal Allergies


Seasonal Allergies:  No





Past Medical History


Surgeries:  CABG, Neurological, Orthopedic


Currently Using CPAP:  No


Cardiac:  Atrial Fibrillation, Coronary Artery Disease, High Cholesterol, 

Hypertension


Sexually Transmitted Disease:  No


HIV/AIDS:  No


Genitourinary:  Benign Prostatic Hyperpl, Bladder Infection


Gastrointestinal:  C-Diff


Musculoskeletal:  Chronic Back Pain


Endocrine:  Diabetes, Non-Insulin dep


HEENT:  Double Vision


Loss of Vision:  Bilateral


Hearing Impairment:  Bilateral Hearing Aide


History of Blood Disorders:  Yes


Adverse Reaction to Blood Levy:  No





Family History





Alzheimer's disease (MOTHER AND SISTER)


Diabetes mellitus (MOTHER,)


Heart Disease





Review of Systems


Constitutional:  see HPI, weakness


EENTM:  no symptoms reported


Respiratory:  no symptoms reported


Cardiovascular:  no symptoms reported


Gastrointestinal:  no symptoms reported


Genitourinary:  no symptoms reported


Musculoskeletal:  back pain


Skin:  no symptoms reported


Psychiatric/Neurological:  No Symptoms Reported


All Other Systems Reviewed


Negative Unless Noted:  Yes





Physical Exam


Physical Exam


Vital Signs





Vital Signs - First Documented








 18





 18:00 18:30 18:40


 


Pulse 92  


 


Resp  17 


 


B/P (MAP) 118/107 (111)  


 


Pulse Ox  97 


 


O2 Delivery   OxyMask


 


O2 Flow Rate   5.00





Capillary Refill :


Height, Weight, BMI


Height: 5'4.00"


Weight: 179lbs. 0.0oz. 81.998383oh; 30.4 BMI


Method:


General Appearance:  No Apparent Distress, WD/WN, Chronically ill, Obese


Eyes:  Bilateral Eye Normal Inspection, Bilateral Eye PERRL


HEENT:  PERRL/EOMI, Normal ENT Inspection, Pharynx Normal


Neck:  Full Range of Motion, Normal Inspection, Non Tender, Supple, Carotid 

Bruit


Respiratory:  Chest Non Tender, Lungs Clear, Normal Breath Sounds, No Accessory 

Muscle Use, No Respiratory Distress


Cardiovascular:  Regular Rate, Rhythm, No Edema, No Gallop, No JVD, No Murmur, 

Normal Peripheral Pulses


Gastrointestinal:  Normal Bowel Sounds, No Organomegaly, No Pulsatile Mass, Non 

Tender, Soft


Back:  Decreased Range of Motion


Extremity:  Normal Capillary Refill, Normal Inspection, Normal Range of Motion, 

Non Tender, No Calf Tenderness, No Pedal Edema


Neurologic/Psychiatric:  Alert, Oriented x3, No Motor/Sensory Deficits, Normal 

Mood/Affect


Skin:  Normal Color, Warm/Dry


Lymphatic:  No Adenopathy





Results


Results/Procedures


Labs


Laboratory Tests


18 22:00








18 04:28








Patient resulted labs reviewed.





Assessment/Plan


Assessment and Plan


Assess & Plan/Chief Complaint


Assessment:


Recurrent severe back pain with referred lower abdominal pain similar to 

initial presentation obtained labs and CT scan and Dr Bustos consultation which 

revealed displaced hardware in need of repeat surgery s/p on 18


Debility following spinal abscess status post 2 I&D's uncomplicated per Dr Bustos


Urinary retention acute maintained with panda but Dr Tawil consulted


C. difficile colitis improved after constipation resolved


Severe anemia due to acute blood loss status post 4 units of blood while on 

MedSurg and ICU


CAD


DM


CRI





Plan:


Pain control


IVF gently to increase UOP


Increase PO nutrition and fluids





Diagnosis/Problems


Diagnosis/Problems





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


Status:  Acute


(2) Intraspinal abscess and granuloma


Status:  Resolved


Resolution Date/Time:  18 @ 14:02


(3) Urinary retention


Status:  Acute


(4) Transfusion of blood during current hospitalization


Status:  Resolved


Resolution Date/Time:  18 @ 14:02


(5) Advanced age


Status:  Chronic


(6) C. difficile colitis


Status:  Acute


(7) Renal insufficiency


Status:  Resolved


Resolution Date/Time:  18 @ 09:41


(8) Leukocytosis


Status:  Resolved


Resolution Date/Time:  18 @ 14:02


(9) CAD (coronary artery disease)


Status:  Chronic


(10) Atrial fibrillation


Status:  Chronic





Clinical Quality Measures


DVT/VTE Risk/Contraindication:


Risk Factor Score Per Nursin


RFS Level Per Nursing on Admit:  4+=Very High





MACHO QUINTERO MED STUDENT 18 1217:


HPI


History of Present Illness:


HPI/Chief Complaint


The patient is recovering in the ICU after a repeat back surgery to stabilize 

failed equipment from previous back surgery. He is comfortable and complaining 

of pain in his back when he moves. He states that the pain in his pelvis and 

lower abdomen has resolved. He reports that he slept very well last night and 

seems to be in much better spirits than yesterday.


Source:  patient


Exam Limitations:  no limitations





Home Medications & Allergies


Home Medications





Active Scripts








 Medications  Dose


 Route/Sig


 Max Daily Dose Days Date Category


 


 Novolog (Insulin


 Aspart) 100


 Unit/1 Ml Susp  0 Unit


 SC ACHS


  18 Rx


 


 Floranex Granules


 Packet (L.


 Acidophilus/Bulgaricus)


 1 Each Gran.pack  1 Gm


 PO ACHS


  18 Rx


 


 Phenazopyridine


 HCl 100 Mg Tablet  100 Mg


 PO TIDPC


  18 Rx


 


 Tramadol HCl 50


 Mg Tablet   Mg


 PO Q4H PRN


  10 11/29/18 Rx


 


 Fentanyl Patch 25


 MCG (Fentanyl) 1


 Each Patch.td72  25 Mcg


 TD Q72H


  10 11/29/18 Rx


 


 Amlodipine


 Besylate 5 Mg


 Tablet  5 Mg


 PO DAILY


  18 Rx


 


 Metoprolol


 Tartrate 25 Mg


 Tablet  25 Mg


 PO BID


  18 Rx


 


 Flomax


  (Tamsulosin HCl)


 0.4 Mg Cap  0.4 Mg


 PO DAILY@1800


  30 18 Rx


 


 Bethanechol


 Chloride 10 Mg


 Tablet  10 Mg


 PO ACHS


  30 18 Rx


 


 Amiodarone HCl


 200 Mg Tablet  200 Mg


 PO DAILY


   18 Reported


 


 Buprenorphine 1


 Each Patch.tdwk  1 Patch


 TD WEEK


   18 Reported


 


 Metformin HCl 500


 Mg Tablet  500 Mg


 PO BID


   18 Reported


 


 Pioglitazone HCl


 45 Mg Tablet  45 Mg


 PO DAILY


   18 Reported


 


 Lovastatin 20 Mg


 Tablet  20 Mg


 PO DAILY


   18 Reported


 


 Allopurinol 300


 Mg Tablet  300 Mg


 PO DAILY


   18 Reported


 


 Lisinopril 20 Mg


 Tablet  20 Mg


 PO DAILY


   18 Reported


 


 Oxybutynin


 Chloride 5 Mg


 Tablet  5 Mg


 PO TID


   18 Reported











Past Medical-Social-Family Hx


Patient Social History


Marrital Status:  


Alcohol Use:  Rarely Uses


Alcohol Beverage of Choice:  Wine


Recreational Drug Use:  No


Smoking Status:  Never a Smoker





Past Medical History


Surgeries:  CABG, Neurological, Orthopedic


Cardiac:  Atrial Fibrillation, Coronary Artery Disease, High Cholesterol, 

Hypertension


Genitourinary:  Benign Prostatic Hyperpl, Bladder Infection


Musculoskeletal:  Chronic Back Pain


Endocrine:  Diabetes, Non-Insulin dep


Hearing Impairment:  Bilateral Hearing Aide





Family History





Alzheimer's disease (MOTHER AND SISTER)


Diabetes mellitus (MOTHER,)





Review of Systems


Constitutional:  weakness


EENTM:  no symptoms reported


Respiratory:  no symptoms reported


Cardiovascular:  no symptoms reported


Gastrointestinal:  no symptoms reported


Genitourinary:  no symptoms reported


Musculoskeletal:  back pain


Skin:  no symptoms reported


Psychiatric/Neurological:  No Symptoms Reported





Physical Exam


Physical Exam


General Appearance:  No Apparent Distress, WD/WN


Respiratory:  Chest Non Tender, Lungs Clear, Normal Breath Sounds, No Accessory 

Muscle Use, No Respiratory Distress


Cardiovascular:  Regular Rate, Rhythm, No Edema, No Gallop, No JVD, No Murmur


Gastrointestinal:  Normal Bowel Sounds, No Organomegaly, No Pulsatile Mass, Non 

Tender, Soft


Neurologic/Psychiatric:  Alert, Oriented x3, No Motor/Sensory Deficits, Normal 

Mood/Affect


Skin:  Normal Color, Warm/Dry





Assessment/Plan


Assessment and Plan


Assess & Plan/Chief Complaint


Assessment:


1) Spinal equipment failure


2) Diabetes Mellitus





Plan:


1) ICU observation


2) Inpatient rehabilitation











LISA QUIÑONES DO 2018 11:55


MACHO QUINTERO STUDENT 2018 12:17

## 2018-11-30 NOTE — OCCUPATIONAL THERAPY EVAL
OT Evaluation-General/PLF


Medical Diagnosis


Admission Date


Nov 29, 2018


Medical Diagnosis:  Lumbar hardware failure, Wound infection


Onset Date:  Nov 30, 2018





Therapy Diagnosis


Therapy Diagnosis:  impaired self care skills





Height/Weight


Height (Feet):  5


Height (Inches):  4.00


Weight (Pounds):  179


Weight (Ounces):  0.0





Precautions


Precautions/Isolations:  Fall Prevention, Standard Precautions


Safety Interventions:  Bed Exit Alarm


Comments


Pt is to be fitted with TLSO, but per physician order is okay to get out of bed 

without brace until it is available.





Referral


Physician:  Juli





Medical History


Pertinent Medical History:  Atrial Fib, CABG, CAD, DM, HTN


Additional Medical History


chronic back pain, BPH


Current History


Pt underwent T10-Pelvis revision with wound debridement. This is pt's 4th back 

surgery since October





Social History


Home:  Single Level


Current Living Status:  Spouse


Entry Into Home:  Stairs With Railing


 Steps Into Home:  3





ADL-Prior Level of Function


Therapy Code Descriptions/Definitions 





Functional [place] Measure:


0=Not Assessed/NA        4=Minimal Assistance


1=Total Assistance        5=Supervision or Setup


2=Maximal Assistance  6=Modified [place]


3=Moderate Assistance 7=Complete [place]








Therapy Quality Codes:


6    Independent with activity with or without an assistive device


5    Patient requires set up or clean up by helper.  Patient completes activity

  by  themselves


4    Supervision or touching assist (CGA). [place] provide cues , steadying 

assist


3    The helper provides less than half the effort to complete the activity


2    The helper provides more than half the effort to complete the activity


1    Dependent.  The helper does all the effort to complete an activity 


7    Patient refused to complete or attempt activity


9    The patient did not perform the activity before the current illness or 

injury


88  Not attempted due to Medical conditions or safety concerns





Functional Abilities and Goals:


Independent: Patient completed the activities by him/herself, with or without 

an assistive device, with no assistance from a helper.


Needed Some Help: Patient needed partial assistance from another person to 

complete activities.


Dependent: A helper completed the activities for the patient. 


Unknown:


Not Applicable:


ADL PLOF Comments


Pt has been requiring assist with ADLs and mobility since back surgery in 

October. Prior to that, pt was able to complete ADLs independently.


Self Care:  Needed Some Help





OT Current Status


Subjective


Pt agreeable to OT/PT, reports 7/10 back pain, but states he is feeling better 

today.





Mental Status/Objective


Patient Orientation:  Person


Attachments:  Drains, Luis Catheter, IV, Oxygen, Other-See Comments (wound vac)





Current


Upper Extremity ROM


Shoulder ROM to ~90degrees. Remainder grossly WFL


Upper Extremity Coordination


Fair


Upper Extremity Strength


decreased bilaterally





ADL-Treatment


ADL-Current


Pt transferred supine to sit and bed to chair with PT with assist x2 for 

safety. While seated in chair pt participated in UE assessment. Pt washed face 

with set up while seated in chair. Requires verbal cues for task completion. Pt 

fatigues with mobility and ADLs and requires rest breaks. Spouse states she 

assisted pt with eating lunch today. Education provided regarding plan of care. 

Pt states understanding and is in agreement with plan. Pt sitting in chair with 

needs met and spouse present. RN notified.


Therapy Code Descriptions/Definitions 





Functional [place] Measure:


0=Not Assessed/NA        4=Minimal Assistance


1=Total Assistance        5=Supervision or Setup


2=Maximal Assistance  6=Modified [place]


3=Moderate Assistance 7=Complete [place]








Therapy Quality Codes:


6    Independent with activity with or without an assistive device


5    Patient requires set up or clean up by helper.  Patient completes activity

  by  themselves


4    Supervision or touching assist (CGA). [place] provide cues , steadying 

assist


3    The helper provides less than half the effort to complete the activity


2    The helper provides more than half the effort to complete the activity


1    Dependent.  The helper does all the effort to complete an activity 


7    Patient refused to complete or attempt activity


9    The patient did not perform the activity before the current illness or 

injury


88  Not attempted due to Medical conditions or safety concerns





Education


OT Patient Education:  Rehab process


Teaching Recipient:  Patient


Teaching Methods:  Discussion


Response to Teaching:  Verbalize Understanding, Reinforcement Needed





OT Short Term Goals


Short Term Goals


Time Frame:  Dec 7, 2018


Eating(FIM):  5


Upper Body Dressing(FIM):  4


Lower Body Dressing(FIM):  3


Transfers (B,C,W/C) (FIM):  3


Additional Short Term Goals:  1-Demonstrate ADL Tasks, 2-Verbalize Understanding

, 3-ImproveStrength/Samina


1=Demonstrate adherence to instructed precautions during ADL tasks.


2=Patient will verbalize/demonstrate understanding of assistive devices/

modifications for ADL.


3=Patient will improve strength/tolerance for activity to enable patient to 

perform ADL's.





OT Long Term Goals


Long Term Goals


Time Frame:  Dec 14, 2018


Grooming(FIM):  5


Upper Body Dressing(FIM):  5


Lower Body Dressing(FIM):  4


Toileting(FIM):  4


Toilet/Commode Transfer(FIM):  4


Additional Goals:  1-Demonstrate ADL Tasks, 2-Verbalize Understanding, 3-

ImproveStrength/Samina


1=Demonstrate adherence to instructed precautions during ADL tasks.


2=Patient will verbalize/demonstrate understanding of assistive devices/

modifications for ADL.


3=Patient will improve strength/tolerance for activity to enable patient to 

perform ADL's.





OT Education/Plan


Problem List/Assessment


Assessment:  Decreased Activ Tolerance, Decreased Safety Aware, Decreased UE 

Strength, Dependent Transfers, Impaired Funct Balance, Impaired I ADL's, 

Impaired Self-Care Skills


Pt to benefit from skilled OT intervention for ADL training, transfers, 

strengthening, adaptive equipment training, and safety education to increase 

functional independence.





Discharge Recommendations


Plan/Recommendations:  Continue POC





Treatment Plan/Plan of Care


Treatment,Training & Education:  Yes


Patient would benefit from OT for education, treatment and training to promote 

independence in ADL's, mobility, safety and/or upper extremity function for ADL'

s.


Plan of Care:  ADL Retraining, Functional Mobility, UE Funct Exercise/Act


Treatment Duration:  Dec 14, 2018


Frequency:  5 times per week


Estimated Hrs Per Day:  .25 hour per day


Rehab Potential:  Fair





Time/GCodes


Start Time:  13:10


Stop Time:  13:22


Total Time Billed (hr/min):  12


Billed Treatment Time


1 visit, NADYA(12minutes)











MANJINDER GIRON OT Nov 30, 2018 14:47

## 2018-11-30 NOTE — ANESTHESIA-GENERAL POST-OP
General


Patient Condition


Mental Status/LOC:  Same as Preop


Cardiovascular:  Satisfactory


Nausea/Vomiting:  Absent


Respiratory:  Satisfactory


Pain:  Controlled


Complications:  Absent





Post Op Complications


Complications


None





Follow Up Care/Instructions


Patient Instructions


None needed.





Anesthesia/Patient Condition


Patient Condition


Patient is doing well, no complaints, stable vital signs, no apparent adverse 

anesthesia problems.   


No complications reported per nursing.


D/C home per [facility] Criteria:  GLENDA Sepulveda CRNA Nov 30, 2018 06:58

## 2018-11-30 NOTE — PHYSICAL THERAPY EVALUATION
PT Evaluation-General


Medical Diagnosis


Admission Date


18


Medical Diagnosis:  Lumbar hardware failure, Wound infection


Onset Date:  2018





Therapy Diagnosis


Therapy Diagnosis:  General weakness / Debility





Height/Weight


Height (Feet):  5


Height (Inches):  4.00


Weight (Pounds):  179


Weight (Ounces):  0.0





Precautions


Precautions/Isolations:  Fall Prevention, Standard Precautions





Weight Bear Status


Right Lower Extremity:  Right


Weight Bearing/Tolerated


Left Lower Extremity:  Left


Weight Bearing/Tolerated





Referral


Physician:  Juli


Reason for Referral:  Evaluation/Treatment





Medical History


Pertinent Medical History:  Atrial Fib, CABG, CAD, DM, HTN


Current History


4th major back surgery in the past 6 weeks, T10-Pelvis revision with wound 

debridement.


Reviewed History:  Yes





Social History


Home:  Single Level


Current Living Status:  Spouse





Prior/Core FIM


Prior Level of Function


Therapy Code Descriptions/Definitions 





Functional [place] Measure:


0=Not Assessed/NA        4=Minimal Assistance


1=Total Assistance        5=Supervision or Setup


2=Maximal Assistance  6=Modified [place]


3=Moderate Assistance 7=Complete [place]








Therapy Quality Codes:


6    Independent with activity with or without an assistive device


5    Patient requires set up or clean up by helper.  Patient completes activity

  by  themselves


4    Supervision or touching assist (CGA). [place] provide cues , steadying 

assist


3    The helper provides less than half the effort to complete the activity


2    The helper provides more than half the effort to complete the activity


1    Dependent.  The helper does all the effort to complete an activity 


7    Patient refused to complete or attempt activity


9    The patient did not perform the activity before the current illness or 

injury


88  Not attempted due to Medical conditions or safety concerns





Functional Abilities and Goals:


Independent: Patient completed the activities by him/herself, with or without 

an assistive device, with no assistance from a helper.


Needed Some Help: Patient needed partial assistance from another person to 

complete activities.


Dependent: A helper completed the activities for the patient. 


Unknown:


Not Applicable:


Bed Mobility:  6


Transfers (B,C,W/C) (FIM):  6


Gait:  6


Stairs:  6


Indoor Mobility (Ambulation):  Needed Some Help


Stairs:  Needed Some Help


Prior Devices Use:  Walker





PT Evaluation-Current


Subjective


Pt awake in room with wife when PT and OT arrived. Pt agreed to get up and 

transfer to bedside recliner.





Pain





   Numeric Pain Scale:  7


   Location:  Posterior


   Location Body Site:  Back


   Pain Description:  Acute





Objective


Patient Orientation:  Normal For Age


Problem Solving:  Fair


Attachments:  Oxygen, Luis Catheter, IV


wound vac





ROM/Strength


ROM Upper Extremities


WNL


ROM Lower Extremities


WNL


Strength Upper Extremities


WNL


Strength Lower Extremities


3+/5 BLE





Integumentary/Posture


Integumentary


refer to nursing notes (wound vac T 10 - pelvis)


Bowel Incontinence:  No


Bladder Incontinence:  Luis Cath


Posture


WFL





Neuromuscular


(Tone, Coordination, Reflexes)


Gross motor coordination intact





Sensory


Vision:  Functional


Hearing:  Hearing Aid/Aides


Sensation Right Upper Extremit:  Intact


Sensation Left Upper Extremity:  Intact


Sensation Right Lower Extremit:  Intact


Sensation Left Lower Extremity:  Intact





Transfers


Therapy Code Descriptions/Definitions 





Functional [place] Measure:


0=Not Assessed/NA        4=Minimal Assistance


1=Total Assistance        5=Supervision or Setup


2=Maximal Assistance  6=Modified [place]


3=Moderate Assistance 7=Complete [place]


Transfers (B, C, W/C) (FIM):  2


Scootin


Rollin


Supine to/from Sit:  2


Sit to/from Stand:  2


x 2 assistants for transfers





Gait


Mode of Locomotion:  Walk


Anticipated Mode of Locomotion:  Walk


Gait (FIM):  1


Distance (FIM):  1=up to 49 ft


Distance:  5'


Gait Level of Assist:  2


Gait Persons Needed:  2


Gait Assistive Device:  None


Comments/Gait Description


with assist of PT x 2





Balance


Sitting Static:  Fair


Sitting Dynamic:  Fair


Standing Static:  Fair


 Standing Dynamic:  Fair





Assessment/Needs


Patient currently has 3+/5 BLE for gross motor strength. Patient is max assist 

for transfers and requires the assistance of two therapist. Patient fatigues 

easily and O2 Sat dropped to 82 during transfer to bedside chair. Pts O2 

increased to 4L of oxygen and his 02 Sat recovered to mid 90s within a minute.


Rehab Potential:  Fair





PT Short Term Goals


Short Term Goals


Time Frame:  Dec 8, 2018


Transfers (B,C,W/C) (FIM):  3


Gait (FIM):  1


Distance (FIM):  1=up to 49 ft


Gait Distance Comment:  20'


Gait Level of Assist:  3


Gait Assistive Device:  FWW





PT Long Term Goals


Long Term Goals


PT Long Term Goals Time Frame:  Dec 22, 2018


Transfers (B,C,W/C) (FIM):  6


Gait (FIM):  6


Gait distance (FIM):  3=150 ft


Distance:  150'


Gait Level of Assist:  6


Gait Assistive Device:  FWW





PT Plan


Problem List


Problem List:  Activity Tolerance, Functional Strength, Safety, Balance, Gait, 

Transfer, Bed Mobility, ROM





Treatment/Plan


Treatment Plan:  Continue Plan of Care


Treatment Plan:  Bed Mobility, Education, Functional Activity [place], Functional 

Strength, Gait, Safety, Therapeutic Exercise, Transfers


Treatment Duration:  Dec 22, 2018


Frequency:  11 times per week


Estimated Hrs Per Day:  .5 hour per day


Patient and/or Family Agrees t:  Yes





Discharge Recommendations


Therapy D/C Recommendations:  Acute Rehab





Time/GCodes


Time In:  1250


Time Out:  1310


Total Billed Treatment Time:  20


Total Billed Treatment


1 [facility] x 10 min[facility]











DANA CROOKS PT 2018 11:41

## 2018-11-30 NOTE — PROGRESS NOTE (SOAP)
Subjective


Date Seen by a Provider:  2018


Time Seen by a Provider:  06:46


Subjective/Events-last exam


Feels much better, awake alert, legs moving better and pain more tolerable.





Objective


Exam





Vital Signs








  Date Time  Temp Pulse Resp B/P (MAP) Pulse Ox O2 Delivery O2 Flow Rate FiO2


 


18 06:00  91 22 122/51 (74) 96 Simple Mask 2.00 


 


18 05:00  91 29 121/48 (72) 97 Simple Mask 2.00 


 


18 04:00  92 9 126/54 (78) 97 Simple Mask 2.00 


 


18 04:00      Simple Mask  


 


18 03:56 99.7       


 


18 03:00  96 14 133/76 (95) 94 Simple Mask 2.00 


 


18 02:00  92 21 117/62 (80) 97 Simple Mask 2.00 


 


18 01:00  94      


 


18 01:00  92 19 127/57 (80) 99 Simple Mask 2.00 


 


18 00:00 99.8       


 


18 00:00  87 23 125/53 (77) 94 Simple Mask 2.00 


 


18 00:00      Simple Mask  


 


18 23:00  87 19 122/65 (84) 97 Simple Mask 2.00 


 


18 22:34     98 OxyMask 2.00 


 


18 22:00  91 23 114/54 (74) 97 Simple Mask 2.00 


 


18 21:00  92 19 123/62 (82) 97 Simple Mask 2.00 


 


18 20:00      Simple Mask  


 


18 20:00 97.2 90 16 91/80 (84) 98 Simple Mask 2.00 


 


18 19:38  92      


 


18 19:00  92 20 87/51 (63) 100   


 


18 18:45  91 17 104/68 (80) 97   


 


18 18:40      OxyMask 5.00 


 


18 18:30  91 17 105/42 (63) 97   


 


18 18:03  92      


 


18 18:00  92  118/107 (111)    


 


18 18:00  92      














I & O 


 


 18





 07:00


 


Intake Total 2690 ml


 


Output Total 1005 ml


 


Balance 1685 ml





Capillary Refill :


General Appearance:  No Apparent Distress


Respiratory:  No Accessory Muscle Use, No Respiratory Distress


Cardiovascular:  Regular Rate, Rhythm, Normal Peripheral Pulses


Extremity:  No Calf Tenderness


Neurologic/Psychiatric:  Alert, Oriented x3, No Motor/Sensory Deficits





Results


Lab


Laboratory Tests


18 22:00: 


Hemoglobin 9.3L, Hematocrit 28L


18 04:28: 


Hemoglobin 8.9L, Hematocrit 27L, White Blood Count 16.9H, Red Blood Count 2.99L

, Mean Corpuscular Volume 90, Mean Corpuscular Hemoglobin 30, Mean Corpuscular 

Hemoglobin Concent 33, Red Cell Distribution Width 17.0H, Platelet Count 409H, 

Mean Platelet Volume 9.8, Sodium Level 135, Potassium Level 4.9, Chloride Level 

108H, Carbon Dioxide Level 16L, Anion Gap 11, Blood Urea Nitrogen 15, 

Creatinine 1.11, Estimat Glomerular Filtration Rate > 60, BUN/Creatinine Ratio 

14, Glucose Level 150H, Calcium Level 7.8L, Corrected Calcium 9.2, Total 

Bilirubin 0.4, Aspartate Amino Transf (AST/SGOT) 54H, Alanine Aminotransferase (

ALT/SGPT) 37, Alkaline Phosphatase 155H, Total Protein 4.3L, Albumin 2.2L





Assessment/Plan


Assessment/Plan


Assess & Plan/Chief Complaint


Lumbar Wound infection with Staph Epi


Lumbar Hardware Failure


Deconditioning


Acute Blood Loss anemia








Plan: Continue to monitor blood count


Mobilize


Fit with TLSO Clamshell Brace when up OOB





Clinical Quality Measures


DVT/VTE Risk/Contraindication:


Risk Factor Score Per Nursin


RFS Level Per Nursing on Admit:  4+=Very High











ENZO DONALDSON MD 2018 06:48

## 2018-11-30 NOTE — OPERATIVE REPORT
DATE OF SERVICE:  11/29/2018



PREOPERATIVE DIAGNOSES:

Lumbar hardware failure, lumbar wound infection, lumbago.



POSTOPERATIVE DIAGNOSES:

Lumbar hardware failure, lumbar wound infection, lumbago.



PROCEDURE PERFORMED:

Removal of segmental instrumentation L2-S1 with reinsertion of spinal fixation

and new instrumentation from thoracic 10 to the pelvis with pelvic fixation at

the caudal end of the construct and T10-11, T11-12, T12-L1 and L1-L2 posterior

spinal fusion, debridement of lumbar spine, superficial and deep.



DATE AND TIME OF SURGERY:

Please see anesthesia record.



IMPLANTS USED:

NuVasive Reline instrumentation and DBM bone graft.



SURGEON:

Wilder Bustos MD



ASSISTANT:

JOIE Soni



ROLE OF FIRST ASSISTANT:

Aid in retraction of the procedure, aid in implantation, instrumentation and

wound closure.



ANESTHESIA:

General endotracheal.



ESTIMATED BLOOD LOSS:

500 mL.



INTRAVENOUS FLUIDS:

Please see anesthesia record.



ANTIBIOTICS:

Vancomycin.



COMPLICATIONS:

None.



INDICATIONS FOR PROCEDURE:

The patient is an 78-year-old male previous fusion, he has had acute early

breakdown above the construct with hardware failure, pain, desires operative

treatment.



DESCRIPTION OF PROCEDURE:

The patient was taken to the preoperative holding area and brought back to the

operative suite.  After adequate induction of general anesthesia, preoperative

antibiotics, placed supine, neuro monitoring was placed, carefully turned prone 
on the

Jorge table, careful padding to all extremities.  Previous wound was opened

and carried proximally.  Hardware was exposed.  It was noted that L2 screws were

acutely loose bilaterally.  His spine was unstable at this segment.  Hardware

was removed.  Wounds were debrided.  Soft tissues, bone and hematoma were

removed and pulse lavage irrigation with gentamicin was utilized to further

clean the wound and then bilateral T10, T11, T12 and L1 screws were placed,

checked with fluoroscopy noted to be satisfactory and bilateral pelvic screws

were placed.  The screws at L4, L5 and S1 were left in place as they were solid

and the new rods were fashioned and contoured from T10 to the pelvis.  Cross

connectors were placed.  Final tightening was performed.  High-speed bur was

used to decorticate the posterior elements from T10 to L2 and then local

autograft and allograft bone was placed.  Deep drain was placed.  Wound was

closed deep and packed open at the top.  The patient was transferred to recovery

room in stable condition having tolerated the procedure well.





Job ID: [id number]

DocumentID: [id number]

Dictated Date:  11/29/2018 16:31:38

Transcription Date: 11/30/2018 00:37:43

Dictated By: WILDER BUSTOS MD

MTDD

## 2018-12-01 VITALS — DIASTOLIC BLOOD PRESSURE: 59 MMHG | SYSTOLIC BLOOD PRESSURE: 134 MMHG

## 2018-12-01 VITALS — DIASTOLIC BLOOD PRESSURE: 52 MMHG | SYSTOLIC BLOOD PRESSURE: 146 MMHG

## 2018-12-01 VITALS — SYSTOLIC BLOOD PRESSURE: 129 MMHG | DIASTOLIC BLOOD PRESSURE: 68 MMHG

## 2018-12-01 VITALS — DIASTOLIC BLOOD PRESSURE: 100 MMHG | SYSTOLIC BLOOD PRESSURE: 127 MMHG

## 2018-12-01 VITALS — DIASTOLIC BLOOD PRESSURE: 62 MMHG | SYSTOLIC BLOOD PRESSURE: 134 MMHG

## 2018-12-01 VITALS — DIASTOLIC BLOOD PRESSURE: 60 MMHG | SYSTOLIC BLOOD PRESSURE: 130 MMHG

## 2018-12-01 VITALS — SYSTOLIC BLOOD PRESSURE: 152 MMHG | DIASTOLIC BLOOD PRESSURE: 59 MMHG

## 2018-12-01 VITALS — DIASTOLIC BLOOD PRESSURE: 87 MMHG | SYSTOLIC BLOOD PRESSURE: 140 MMHG

## 2018-12-01 VITALS — SYSTOLIC BLOOD PRESSURE: 148 MMHG | DIASTOLIC BLOOD PRESSURE: 56 MMHG

## 2018-12-01 VITALS — DIASTOLIC BLOOD PRESSURE: 52 MMHG | SYSTOLIC BLOOD PRESSURE: 116 MMHG

## 2018-12-01 VITALS — SYSTOLIC BLOOD PRESSURE: 108 MMHG | DIASTOLIC BLOOD PRESSURE: 65 MMHG

## 2018-12-01 VITALS — DIASTOLIC BLOOD PRESSURE: 44 MMHG | SYSTOLIC BLOOD PRESSURE: 103 MMHG

## 2018-12-01 VITALS — DIASTOLIC BLOOD PRESSURE: 95 MMHG | SYSTOLIC BLOOD PRESSURE: 122 MMHG

## 2018-12-01 VITALS — SYSTOLIC BLOOD PRESSURE: 92 MMHG | DIASTOLIC BLOOD PRESSURE: 58 MMHG

## 2018-12-01 VITALS — DIASTOLIC BLOOD PRESSURE: 67 MMHG | SYSTOLIC BLOOD PRESSURE: 113 MMHG

## 2018-12-01 VITALS — DIASTOLIC BLOOD PRESSURE: 60 MMHG | SYSTOLIC BLOOD PRESSURE: 145 MMHG

## 2018-12-01 VITALS — SYSTOLIC BLOOD PRESSURE: 121 MMHG | DIASTOLIC BLOOD PRESSURE: 58 MMHG

## 2018-12-01 VITALS — SYSTOLIC BLOOD PRESSURE: 133 MMHG | DIASTOLIC BLOOD PRESSURE: 68 MMHG

## 2018-12-01 VITALS — SYSTOLIC BLOOD PRESSURE: 134 MMHG | DIASTOLIC BLOOD PRESSURE: 59 MMHG

## 2018-12-01 VITALS — DIASTOLIC BLOOD PRESSURE: 62 MMHG | SYSTOLIC BLOOD PRESSURE: 146 MMHG

## 2018-12-01 VITALS — SYSTOLIC BLOOD PRESSURE: 127 MMHG | DIASTOLIC BLOOD PRESSURE: 60 MMHG

## 2018-12-01 VITALS — SYSTOLIC BLOOD PRESSURE: 131 MMHG | DIASTOLIC BLOOD PRESSURE: 51 MMHG

## 2018-12-01 VITALS — DIASTOLIC BLOOD PRESSURE: 63 MMHG | SYSTOLIC BLOOD PRESSURE: 127 MMHG

## 2018-12-01 LAB
ALBUMIN SERPL-MCNC: 2.1 GM/DL (ref 3.2–4.5)
ALP SERPL-CCNC: 148 U/L (ref 40–136)
ALT SERPL-CCNC: 29 U/L (ref 0–55)
BASOPHILS # BLD AUTO: 0 10^3/UL (ref 0–0.1)
BASOPHILS NFR BLD AUTO: 0 % (ref 0–10)
BILIRUB SERPL-MCNC: 0.4 MG/DL (ref 0.1–1)
BUN/CREAT SERPL: 16
CALCIUM SERPL-MCNC: 7.4 MG/DL (ref 8.5–10.1)
CHLORIDE SERPL-SCNC: 106 MMOL/L (ref 98–107)
CO2 SERPL-SCNC: 18 MMOL/L (ref 21–32)
CREAT SERPL-MCNC: 1.28 MG/DL (ref 0.6–1.3)
EOSINOPHIL # BLD AUTO: 0.1 10^3/UL (ref 0–0.3)
EOSINOPHIL NFR BLD AUTO: 1 % (ref 0–10)
ERYTHROCYTE [DISTWIDTH] IN BLOOD BY AUTOMATED COUNT: 17 % (ref 10–14.5)
GFR SERPLBLD BASED ON 1.73 SQ M-ARVRAT: 54 ML/MIN
GLUCOSE SERPL-MCNC: 152 MG/DL (ref 70–105)
HCT VFR BLD CALC: 23 % (ref 40–54)
HGB BLD-MCNC: 7.6 G/DL (ref 13.3–17.7)
LYMPHOCYTES # BLD AUTO: 1.4 X 10^3 (ref 1–4)
LYMPHOCYTES NFR BLD AUTO: 10 % (ref 12–44)
MAGNESIUM SERPL-MCNC: 1.1 MG/DL (ref 1.8–2.4)
MANUAL DIFFERENTIAL PERFORMED BLD QL: NO
MCH RBC QN AUTO: 30 PG (ref 25–34)
MCHC RBC AUTO-ENTMCNC: 33 G/DL (ref 32–36)
MCV RBC AUTO: 90 FL (ref 80–99)
MONOCYTES # BLD AUTO: 1.5 X 10^3 (ref 0–1)
MONOCYTES NFR BLD AUTO: 11 % (ref 0–12)
NEUTROPHILS # BLD AUTO: 10.7 X 10^3 (ref 1.8–7.8)
NEUTROPHILS NFR BLD AUTO: 78 % (ref 42–75)
PHOSPHATE SERPL-MCNC: 2.6 MG/DL (ref 2.3–4.7)
PLATELET # BLD: 315 10^3/UL (ref 130–400)
PMV BLD AUTO: 9.9 FL (ref 7.4–10.4)
POTASSIUM SERPL-SCNC: 4.2 MMOL/L (ref 3.6–5)
PROT SERPL-MCNC: 4 GM/DL (ref 6.4–8.2)
RBC # BLD AUTO: 2.55 10^6/UL (ref 4.35–5.85)
SODIUM SERPL-SCNC: 133 MMOL/L (ref 135–145)
WBC # BLD AUTO: 13.7 10^3/UL (ref 4.3–11)

## 2018-12-01 RX ADMIN — SENNOSIDES SCH MG: 8.6 TABLET, FILM COATED ORAL at 20:15

## 2018-12-01 RX ADMIN — SODIUM CHLORIDE SCH MG: 900 INJECTION, SOLUTION INTRAVENOUS at 13:46

## 2018-12-01 RX ADMIN — Medication SCH EA: at 06:54

## 2018-12-01 RX ADMIN — MAGNESIUM SULFATE IN DEXTROSE SCH MLS/HR: 10 INJECTION, SOLUTION INTRAVENOUS at 17:50

## 2018-12-01 RX ADMIN — MAGNESIUM SULFATE IN DEXTROSE SCH MLS/HR: 10 INJECTION, SOLUTION INTRAVENOUS at 15:48

## 2018-12-01 RX ADMIN — MAGNESIUM SULFATE IN DEXTROSE SCH MLS/HR: 10 INJECTION, SOLUTION INTRAVENOUS at 17:49

## 2018-12-01 RX ADMIN — CYCLOBENZAPRINE HYDROCHLORIDE PRN MG: 10 TABLET, FILM COATED ORAL at 14:09

## 2018-12-01 RX ADMIN — SODIUM CHLORIDE SCH MLS/HR: 900 INJECTION, SOLUTION INTRAVENOUS at 20:14

## 2018-12-01 RX ADMIN — SENNOSIDES SCH MG: 8.6 TABLET, FILM COATED ORAL at 08:00

## 2018-12-01 RX ADMIN — FAMOTIDINE SCH MG: 20 TABLET, FILM COATED ORAL at 08:01

## 2018-12-01 RX ADMIN — DOCUSATE SODIUM SCH MG: 100 CAPSULE ORAL at 08:00

## 2018-12-01 RX ADMIN — SODIUM CHLORIDE SCH MLS/HR: 900 INJECTION, SOLUTION INTRAVENOUS at 03:39

## 2018-12-01 RX ADMIN — DOCUSATE SODIUM SCH MG: 100 CAPSULE ORAL at 20:15

## 2018-12-01 NOTE — PHYSICAL THERAPY PROGRESS NOTE
Therapy Progress Note


1105am Pt receiving blood transfusion. No PT treatment today.











JAYCOB HEARD PTA Dec 1, 2018 12:12

## 2018-12-01 NOTE — PROGRESS NOTE-HOSPITALIST
Subjective


HPI/CC On Admission


Date Seen by Provider:  Dec 1, 2018


Time Seen by Provider:  11:00


CC: s/p spinal hardware failure replacement





HPI: This is an 78-year-old white male who was transferred from inpatient 

rehabilitation department to ICU for close monitoring following spinal hardware 

failure replacement yesterday.  He had an uncomplicated surgery and is 

currently recovering and doing well overall.  Urinary output has decreased and 

blood pressure is 115 so will initiate low flow IV fluid of normal saline and 

encouraged him to take in nutrition and fluids today.  He will be fitted for a 

shell back brace and then we will get him out of bed.  Overall patient is doing 

much better wife was updated and patient feels better overall.


Subjective/Events-last exam


Patient is doing about the same


Spine surgery ordered 2 more units of blood


Patient not eating too well but drinking ensure


Wheezing noted on first initial progress so encouraged use of incentive 

spirometer


Bowels not moving today yet


Catheter still in place





Review of Systems


General:  Fatigue


Genitourinary:  Retention


Musculoskeletal:  back pain





Objective


Exam


Vital Signs





Vital Signs








  Date Time  Temp Pulse Resp B/P (MAP) Pulse Ox O2 Delivery O2 Flow Rate FiO2


 


18 12:22 99.2 91 15 134/59 94 Nasal Cannula 2.00 





Capillary Refill :


General Appearance:  No Apparent Distress, WD/WN, Chronically ill


Respiratory:  Chest Non Tender, Lungs Clear, Normal Breath Sounds, No Accessory 

Muscle Use, No Respiratory Distress


Cardiovascular:  Regular Rate, Rhythm, No Edema, No Gallop, No JVD, No Murmur, 

Normal Peripheral Pulses


Neurologic/Psychiatric:  Alert, Oriented x3, No Motor/Sensory Deficits, Normal 

Mood/Affect


Skin:  Normal Color, Warm/Dry





Results/Procedures


Lab


Laboratory Tests


18 03:30








Patient resulted labs reviewed.





Assessment/Plan


Assessment and Plan


Assess & Plan/Chief Complaint


Assessment:


Recurrent severe back pain with referred lower abdominal pain similar to 

initial presentation obtained labs and CT scan and Dr Bustos consultation which 

revealed displaced hardware in need of repeat surgery s/p on 18 POD # 2


Debility following spinal abscess status post 2 I&D's uncomplicated per Dr Bustos


Urinary retention acute maintained with panda but Dr Tawil consulted


C. difficile colitis resolved


Severe anemia due to acute blood loss status post 4 units of blood while on 

MedSurg and ICU requiring another 2 units today


CAD


DM


CRI





Plan:


Pain control


Give blood


Increase PO nutrition and fluids


Use IS faithfully





Diagnosis/Problems


Diagnosis/Problems





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


Status:  Resolved


Resolution Date/Time:  18 @ 12:52


(2) Intraspinal abscess and granuloma


Status:  Resolved


Resolution Date/Time:  18 @ 14:02


(3) Urinary retention


Status:  Acute


(4) Transfusion of blood during current hospitalization


Status:  Acute


(5) Advanced age


Status:  Chronic


(6) C. difficile colitis


Status:  Acute


(7) Renal insufficiency


Status:  Resolved


Resolution Date/Time:  18 @ 09:41


(8) Leukocytosis


Status:  Resolved


Resolution Date/Time:  18 @ 14:02


(9) CAD (coronary artery disease)


Status:  Chronic


(10) Atrial fibrillation


Status:  Chronic


Qualifiers:  


   Atrial fibrillation type:  unspecified  Qualified Codes:  I48.91 - 

Unspecified atrial fibrillation





Clinical Quality Measures


DVT/VTE Risk/Contraindication:


Risk Factor Score Per Nursin


RFS Level Per Nursing on Admit:  4+=Very High











LISA QUIÑONES DO Dec 1, 2018 12:38

## 2018-12-01 NOTE — DIAGNOSTIC IMAGING REPORT
INDICATION: Thoracolumbar fusion.



Time of Exam 9:43 AM



FINDINGS:  

Further spinal fixation has occurred since the prior CT from

11/27/2018. There are now vertical dipak extending from T10 through

the sacrum with biventricular screws at T10, T11 and T12 as well

as L1. The screws at L2 have been removed. There are bipedicle

screws at L4, L5, S1 and S2. The vertebral body heights are

maintained. Antibiotic beads now within the disc space at L2-3

level are noted. There is intervertebral prostheses at L3-4, L4-5

and L5 S. S1 levels which appear to be in stable position.

Atherosclerotic throughout the aorta are noted



IMPRESSION:

Revision of previously noted posterior instrumented fusion of the

lumbar spine, which extends from T10-S2. Hardware appears to be

intact. Overall alignment is anatomic.



Dictated by: 



  Dictated on workstation # [id number]

## 2018-12-01 NOTE — PROGRESS NOTE (SOAP)
Subjective


Time Seen by a Provider:  08:14


Subjective/Events-last exam


Pt is doing good.  He has some low back pain, but resting comfortably for the 

most part.  Feels that it's an improvement from before the procedure.





Objective


Exam





Vital Signs








  Date Time  Temp Pulse Resp B/P (MAP) Pulse Ox O2 Delivery O2 Flow Rate FiO2


 


18 07:56 98.6       


 


18 06:00  71 20 116/52 (73) 97 Nasal Cannula 2.00 


 


18 04:00 99.3       


 


18 04:00      Nasal Cannula 2.00 


 


18 01:33 99.5 93 33 121/58 (79) 96 Nasal Cannula 2.00 


 


18 01:00  92      


 


18 00:00      Nasal Cannula 2.00 


 


18 23:00  93 36  96 Simple Mask 2.00 


 


18 20:00      Nasal Cannula 2.00 


 


18 20:00 98.5       


 


18 20:00  96 28 135/57 (83) 93 Simple Mask 2.00 


 


18 19:00  95      


 


18 19:00  98 20  95 Simple Mask 2.00 


 


18 18:00  92 31 135/81 (99) 96 Simple Mask 2.00 


 


18 17:00  91 43 114/34 (60) 96 Simple Mask 2.00 


 


18 16:42      Nasal Cannula 2.00 


 


18 16:34 97.8       


 


18 16:06      Nasal Cannula 2.00 


 


18 16:00  90 47 117/40 (65) 96 Simple Mask 2.00 


 


18 15:00  97 21 113/37 (62) 96 Simple Mask 2.00 


 


18 14:00  91 22 122/48 (72) 94 Simple Mask 2.00 


 


18 13:39  93      


 


18 13:00  98 41 116/37 (63)  Simple Mask 2.00 


 


18 13:00  98      


 


18 12:00  93 29 118/50 (72) 96 Simple Mask 2.00 


 


18 11:53 97.6       


 


18 11:52      Nasal Cannula 2.00 


 


18 11:00  92 22 114/42 (66) 95 Simple Mask 2.00 


 


18 10:00  89 25 110/42 (64) 97 Simple Mask 2.00 


 


18 09:00  90 23 109/48 (68) 92 Simple Mask 2.00 


 


18 08:30      Nasal Cannula 2.00 














I & O 


 


 18





 07:00


 


Intake Total 1000 ml


 


Output Total 905 ml


 


Balance 95 ml





Capillary Refill :


General Appearance:  No Apparent Distress, WD/WN


Respiratory:  No Accessory Muscle Use, No Respiratory Distress


Extremity:  No Calf Tenderness


Neurologic/Psychiatric:  Alert, Oriented x3, No Motor/Sensory Deficits





Results


Lab


Laboratory Tests


18 09:55: 


18 11:31: Glucometer 168H


18 16:28: Glucometer 221H


18 03:30: 


White Blood Count 13.7H, Red Blood Count 2.55L, Hemoglobin 7.6L, Hematocrit 23L

, Mean Corpuscular Volume 90, Mean Corpuscular Hemoglobin 30, Mean Corpuscular 

Hemoglobin Concent 33, Red Cell Distribution Width 17.0H, Platelet Count 315, 

Mean Platelet Volume 9.9, Neutrophils (%) (Auto) 78H, Lymphocytes (%) (Auto) 10L

, Monocytes (%) (Auto) 11, Eosinophils (%) (Auto) 1, Basophils (%) (Auto) 0, 

Neutrophils # (Auto) 10.7H, Lymphocytes # (Auto) 1.4, Monocytes # (Auto) 1.5H, 

Eosinophils # (Auto) 0.1, Basophils # (Auto) 0.0, Sodium Level 133L, Potassium 

Level 4.2, Chloride Level 106, Carbon Dioxide Level 18L, Anion Gap 9, Blood 

Urea Nitrogen 20H, Creatinine 1.28, Estimat Glomerular Filtration Rate 54, BUN/

Creatinine Ratio 16, Glucose Level 152H, Calcium Level 7.4L, Corrected Calcium 

8.9, Phosphorus Level 2.6, Magnesium Level 1.1L, Total Bilirubin 0.4, Aspartate 

Amino Transf (AST/SGOT) 36H, Alanine Aminotransferase (ALT/SGPT) 29, Alkaline 

Phosphatase 148H, Total Protein 4.0L, Albumin 2.1L





Assessment/Plan


Assessment/Plan


Assess & Plan/Chief Complaint


S/P revision T10-Pelvis fusion


ABLA





Transfuse 2 units today


Continue PT and pain control


RAPHAEL 60, can remove if output less than 50 in 8 hrs





Clinical Quality Measures


DVT/VTE Risk/Contraindication:


Risk Factor Score Per Nursin


RFS Level Per Nursing on Admit:  4+=Very High











HUMBERTO CHARLES Dec 1, 2018 08:20

## 2018-12-02 VITALS — SYSTOLIC BLOOD PRESSURE: 128 MMHG | DIASTOLIC BLOOD PRESSURE: 58 MMHG

## 2018-12-02 VITALS — DIASTOLIC BLOOD PRESSURE: 52 MMHG | SYSTOLIC BLOOD PRESSURE: 141 MMHG

## 2018-12-02 VITALS — DIASTOLIC BLOOD PRESSURE: 53 MMHG | SYSTOLIC BLOOD PRESSURE: 131 MMHG

## 2018-12-02 VITALS — SYSTOLIC BLOOD PRESSURE: 138 MMHG | DIASTOLIC BLOOD PRESSURE: 67 MMHG

## 2018-12-02 VITALS — DIASTOLIC BLOOD PRESSURE: 51 MMHG | SYSTOLIC BLOOD PRESSURE: 124 MMHG

## 2018-12-02 VITALS — DIASTOLIC BLOOD PRESSURE: 55 MMHG | SYSTOLIC BLOOD PRESSURE: 132 MMHG

## 2018-12-02 VITALS — SYSTOLIC BLOOD PRESSURE: 150 MMHG | DIASTOLIC BLOOD PRESSURE: 70 MMHG

## 2018-12-02 VITALS — DIASTOLIC BLOOD PRESSURE: 58 MMHG | SYSTOLIC BLOOD PRESSURE: 135 MMHG

## 2018-12-02 VITALS — SYSTOLIC BLOOD PRESSURE: 123 MMHG | DIASTOLIC BLOOD PRESSURE: 74 MMHG

## 2018-12-02 VITALS — DIASTOLIC BLOOD PRESSURE: 65 MMHG | SYSTOLIC BLOOD PRESSURE: 126 MMHG

## 2018-12-02 VITALS — DIASTOLIC BLOOD PRESSURE: 56 MMHG | SYSTOLIC BLOOD PRESSURE: 129 MMHG

## 2018-12-02 VITALS — DIASTOLIC BLOOD PRESSURE: 58 MMHG | SYSTOLIC BLOOD PRESSURE: 127 MMHG

## 2018-12-02 VITALS — SYSTOLIC BLOOD PRESSURE: 139 MMHG | DIASTOLIC BLOOD PRESSURE: 61 MMHG

## 2018-12-02 VITALS — DIASTOLIC BLOOD PRESSURE: 60 MMHG | SYSTOLIC BLOOD PRESSURE: 129 MMHG

## 2018-12-02 VITALS — SYSTOLIC BLOOD PRESSURE: 135 MMHG | DIASTOLIC BLOOD PRESSURE: 60 MMHG

## 2018-12-02 VITALS — DIASTOLIC BLOOD PRESSURE: 67 MMHG | SYSTOLIC BLOOD PRESSURE: 149 MMHG

## 2018-12-02 LAB
BASOPHILS # BLD AUTO: 0 10^3/UL (ref 0–0.1)
BASOPHILS NFR BLD AUTO: 0 % (ref 0–10)
BUN/CREAT SERPL: 15
CALCIUM SERPL-MCNC: 7.6 MG/DL (ref 8.5–10.1)
CHLORIDE SERPL-SCNC: 105 MMOL/L (ref 98–107)
CO2 SERPL-SCNC: 19 MMOL/L (ref 21–32)
CREAT SERPL-MCNC: 1.2 MG/DL (ref 0.6–1.3)
EOSINOPHIL # BLD AUTO: 0.3 10^3/UL (ref 0–0.3)
EOSINOPHIL NFR BLD AUTO: 3 % (ref 0–10)
ERYTHROCYTE [DISTWIDTH] IN BLOOD BY AUTOMATED COUNT: 16.3 % (ref 10–14.5)
GFR SERPLBLD BASED ON 1.73 SQ M-ARVRAT: 58 ML/MIN
GLUCOSE SERPL-MCNC: 117 MG/DL (ref 70–105)
HCT VFR BLD CALC: 26 % (ref 40–54)
HGB BLD-MCNC: 8.9 G/DL (ref 13.3–17.7)
LYMPHOCYTES # BLD AUTO: 1.1 X 10^3 (ref 1–4)
LYMPHOCYTES NFR BLD AUTO: 10 % (ref 12–44)
MAGNESIUM SERPL-MCNC: 1.8 MG/DL (ref 1.8–2.4)
MANUAL DIFFERENTIAL PERFORMED BLD QL: NO
MCH RBC QN AUTO: 30 PG (ref 25–34)
MCHC RBC AUTO-ENTMCNC: 34 G/DL (ref 32–36)
MCV RBC AUTO: 89 FL (ref 80–99)
MONOCYTES # BLD AUTO: 0.9 X 10^3 (ref 0–1)
MONOCYTES NFR BLD AUTO: 9 % (ref 0–12)
NEUTROPHILS # BLD AUTO: 8.7 X 10^3 (ref 1.8–7.8)
NEUTROPHILS NFR BLD AUTO: 79 % (ref 42–75)
PHOSPHATE SERPL-MCNC: 2 MG/DL (ref 2.3–4.7)
PLATELET # BLD: 318 10^3/UL (ref 130–400)
PMV BLD AUTO: 9.7 FL (ref 7.4–10.4)
POTASSIUM SERPL-SCNC: 4.1 MMOL/L (ref 3.6–5)
RBC # BLD AUTO: 2.95 10^6/UL (ref 4.35–5.85)
SODIUM SERPL-SCNC: 131 MMOL/L (ref 135–145)
WBC # BLD AUTO: 11.1 10^3/UL (ref 4.3–11)

## 2018-12-02 RX ADMIN — ALBUTEROL SULFATE SCH MG: 2.5 SOLUTION RESPIRATORY (INHALATION) at 19:41

## 2018-12-02 RX ADMIN — SENNOSIDES SCH MG: 8.6 TABLET, FILM COATED ORAL at 09:01

## 2018-12-02 RX ADMIN — BISACODYL PRN MG: 5 TABLET, COATED ORAL at 10:01

## 2018-12-02 RX ADMIN — CYCLOBENZAPRINE HYDROCHLORIDE PRN MG: 10 TABLET, FILM COATED ORAL at 09:00

## 2018-12-02 RX ADMIN — Medication SCH EA: at 05:33

## 2018-12-02 RX ADMIN — BISACODYL PRN MG: 10 SUPPOSITORY RECTAL at 09:02

## 2018-12-02 RX ADMIN — DOCUSATE SODIUM SCH MG: 100 CAPSULE ORAL at 09:01

## 2018-12-02 RX ADMIN — ALBUTEROL SULFATE SCH MG: 2.5 SOLUTION RESPIRATORY (INHALATION) at 14:27

## 2018-12-02 RX ADMIN — DOCUSATE SODIUM SCH MG: 100 CAPSULE ORAL at 20:28

## 2018-12-02 RX ADMIN — SODIUM CHLORIDE SCH MLS/HR: 900 INJECTION, SOLUTION INTRAVENOUS at 04:14

## 2018-12-02 RX ADMIN — FAMOTIDINE SCH MG: 20 TABLET, FILM COATED ORAL at 09:01

## 2018-12-02 RX ADMIN — SODIUM CHLORIDE SCH MLS/HR: 900 INJECTION, SOLUTION INTRAVENOUS at 20:33

## 2018-12-02 RX ADMIN — SENNOSIDES SCH MG: 8.6 TABLET, FILM COATED ORAL at 20:28

## 2018-12-02 NOTE — PROGRESS NOTE (SOAP)
Subjective


Date Seen by a Provider:  Dec 2, 2018


Time Seen by a Provider:  12:39


Subjective/Events-last exam


Doing better, awake, alert, concerned about bloating.





Objective


Exam





Vital Signs








  Date Time  Temp Pulse Resp B/P (MAP) Pulse Ox O2 Delivery O2 Flow Rate FiO2


 


18 12:00  84 14 135/58 (83) 97 Nasal Cannula 4.00 


 


18 11:00  86 19 129/60 (83) 97 Nasal Cannula 4.00 


 


18 10:00  85 19 149/67 (94) 98 Nasal Cannula 4.00 


 


18 09:00  80 16 129/56 (80) 98 Nasal Cannula 4.00 


 


18 08:00      Nasal Cannula 2.00 


 


18 08:00  87 21 141/52 (81) 97 Nasal Cannula 4.00 


 


18 08:00 98.7     Nasal Cannula 4.00 


 


18 07:00  80      


 


18 07:00  80 28 131/53 (79) 97 Nasal Cannula 4.00 


 


18 06:00  83 24 139/61 (87) 97 Nasal Cannula 4.00 


 


18 05:00  83 20 135/60 (85) 97 Nasal Cannula 4.00 


 


18 04:00  81 17 126/65 (85) 96 Nasal Cannula 4.00 


 


18 04:00 98.7       


 


18 04:00      Nasal Cannula 2.00 


 


18 03:00  84 17 123/74 (90) 92 Nasal Cannula 4.00 


 


18 02:00  84 18 132/55 (80) 96 Nasal Cannula 4.00 


 


18 01:00  90 20 128/58 (81) 96 Nasal Cannula 4.00 


 


18 01:00  90      


 


18 00:00 98.8       


 


18 00:00      Nasal Cannula 2.00 


 


18 00:00  90 20 131/58 (82) 91 Nasal Cannula 4.00 


 


18 23:00  89 19 127/63 (84) 94 Nasal Cannula 4.00 


 


18 22:00  87 19 131/51 (77) 94 Nasal Cannula 4.00 


 


18 21:00  87 19 130/60 (83) 97 Nasal Cannula 4.00 


 


18 20:00  87 18 129/68 (88) 97 Nasal Cannula 4.00 


 


18 20:00      Nasal Cannula 2.00 


 


18 19:00  94      


 


18 19:00  92 16 127/60 (82) 97 Nasal Cannula 4.00 


 


18 18:00  98 15 146/62 (90) 92 Nasal Cannula 4.00 


 


18 17:00  93 27 122/95 (104) 94 Nasal Cannula 2.00 


 


18 16:30      Nasal Cannula 2.00 


 


18 16:00  93 21 133/68 (89) 95 Nasal Cannula 2.00 


 


18 15:49 99.8 90 27 134/62 95 Nasal Cannula 2.00 


 


18 15:00  90 12 127/100 (109) 98 Nasal Cannula 2.00 


 


18 14:00  90 23 108/65 (79) 92 Nasal Cannula 2.00 


 


18 14:00 98.9 90 14 108/65 95 Nasal Cannula 2.00 


 


18 13:36 99.0 90 19 148/56 97 Nasal Cannula 2.00 


 


18 13:00  88 18 146/52 (83) 90 Nasal Cannula 2.00 


 


18 13:00  89      














I & O 


 


 18





 07:00


 


Intake Total 700 ml


 


Output Total 1840 ml


 


Balance -1140 ml





Capillary Refill :


General Appearance:  No Apparent Distress


Neck:  Non Tender, Supple


Respiratory:  No Accessory Muscle Use, No Respiratory Distress


Cardiovascular:  Regular Rate, Rhythm, Normal Peripheral Pulses


Gastrointestinal:  non tender, distended


Extremity:  Normal Capillary Refill, Swelling


Neurologic/Psychiatric:  Oriented x3, Motor Weakness





Results


Lab


Laboratory Tests


18 17:42: Glucometer 204H


18 21:35: Magnesium Level 1.9


18 03:35: 


Magnesium Level 1.8, White Blood Count 11.1H, Red Blood Count 2.95L, Hemoglobin 

8.9L, Hematocrit 26L, Mean Corpuscular Volume 89, Mean Corpuscular Hemoglobin 30

, Mean Corpuscular Hemoglobin Concent 34, Red Cell Distribution Width 16.3H, 

Platelet Count 318, Mean Platelet Volume 9.7, Neutrophils (%) (Auto) 79H, 

Lymphocytes (%) (Auto) 10L, Monocytes (%) (Auto) 9, Eosinophils (%) (Auto) 3, 

Basophils (%) (Auto) 0, Neutrophils # (Auto) 8.7H, Lymphocytes # (Auto) 1.1, 

Monocytes # (Auto) 0.9, Eosinophils # (Auto) 0.3, Basophils # (Auto) 0.0, 

Sodium Level 131L, Potassium Level 4.1, Chloride Level 105, Carbon Dioxide 

Level 19L, Anion Gap 7, Blood Urea Nitrogen 18, Creatinine 1.20, Estimat 

Glomerular Filtration Rate 58, BUN/Creatinine Ratio 15, Glucose Level 117H, 

Calcium Level 7.6L, Phosphorus Level 2.0L





Assessment/Plan


Assessment/Plan


Assess & Plan/Chief Complaint


Lumbar Wound infection with Staph Epi


Lumbar Hardware Failure


Deconditioning


Acute Blood Loss anemia








Plan: Continue to monitor blood count


Mobilize


Fit with TLSO Clamshell Brace when up OOB


Check ultrasounds of 4 extremities.





Clinical Quality Measures


DVT/VTE Risk/Contraindication:


Risk Factor Score Per Nursin


RFS Level Per Nursing on Admit:  4+=Very High











ENZO DONALDSON MD Dec 2, 2018 12:41 pm

## 2018-12-02 NOTE — PROGRESS NOTE-HOSPITALIST
Subjective


HPI/CC On Admission


Date Seen by Provider:  Dec 2, 2018


Time Seen by Provider:  11:15


CC: s/p spinal hardware failure replacement





HPI: This is an 78-year-old white male who was transferred from inpatient 

rehabilitation department to ICU for close monitoring following spinal hardware 

failure replacement yesterday.  He had an uncomplicated surgery and is 

currently recovering and doing well overall.  Urinary output has decreased and 

blood pressure is 115 so will initiate low flow IV fluid of normal saline and 

encouraged him to take in nutrition and fluids today.  He will be fitted for a 

shell back brace and then we will get him out of bed.  Overall patient is doing 

much better wife was updated and patient feels better overall.


Subjective/Events-last exam


Patient doing better just slowly


Repeat hgb improved after a total of 3 units of blood since 18


Shell back brace arrived yesterday


Pain is improved


Soft abdomen and good BS but he needs to have BM and had some cramps earlier


Using IS


Does have a cough but lungs are clear but will start Nebs





Review of Systems


General:  Fatigue


Pulmonary:  Cough


Gastrointestinal:  Constipation


Musculoskeletal:  back pain





Objective


Exam


Vital Signs





Vital Signs








  Date Time  Temp Pulse Resp B/P (MAP) Pulse Ox O2 Delivery O2 Flow Rate FiO2


 


18 14:00  85 16 124/51 (75) 99 Nasal Cannula 4.00 


 


18 08:00 98.7       





Capillary Refill :


General Appearance:  No Apparent Distress, WD/WN, Chronically ill


Respiratory:  Chest Non Tender, Lungs Clear, Normal Breath Sounds, No Accessory 

Muscle Use, No Respiratory Distress


Cardiovascular:  Regular Rate, Rhythm, No Edema, No Gallop, No JVD, No Murmur, 

Normal Peripheral Pulses


Gastrointestinal:  Normal Bowel Sounds, No Organomegaly, No Pulsatile Mass, Non 

Tender, Soft


Neurologic/Psychiatric:  Alert, Oriented x3, No Motor/Sensory Deficits, Normal 

Mood/Affect





Results/Procedures


Lab


Laboratory Tests


18 03:35








Patient resulted labs reviewed.





Assessment/Plan


Assessment and Plan


Assess & Plan/Chief Complaint


Assessment:


Recurrent severe back pain with referred lower abdominal pain similar to 

initial presentation obtained labs and CT scan and Dr Bustos consultation which 

revealed displaced hardware in need of repeat surgery s/p on 18 POD # 3


Debility following spinal abscess status post 2 I&D's uncomplicated per Dr Bustos


Urinary retention acute maintained with panda but Dr Tawil consulted


C. difficile colitis resolved


Severe anemia due to acute blood loss status post 4 units of blood while on 

MedSurg and ICU then 1 unit  then requiring another 2 units yesterday


CAD


DM


CRI





Plan:


Pain control


Increase PO nutrition and fluids


Use IS faithfully


Monitor cough


BM regimen


Nebs


IRF versus SB





Diagnosis/Problems


Diagnosis/Problems





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


Status:  Resolved


Resolution Date/Time:  18 @ 12:52


(2) Intraspinal abscess and granuloma


Status:  Resolved


Resolution Date/Time:  18 @ 14:02


(3) Urinary retention


Status:  Acute


(4) Transfusion of blood during current hospitalization


Status:  Acute


(5) Advanced age


Status:  Chronic


(6) C. difficile colitis


Status:  Acute


(7) Renal insufficiency


Status:  Resolved


Resolution Date/Time:  18 @ 09:41


(8) Leukocytosis


Status:  Resolved


Resolution Date/Time:  18 @ 14:02


(9) CAD (coronary artery disease)


Status:  Chronic


(10) Atrial fibrillation


Status:  Chronic


Qualifiers:  


   Atrial fibrillation type:  unspecified  Qualified Codes:  I48.91 - 

Unspecified atrial fibrillation





Clinical Quality Measures


DVT/VTE Risk/Contraindication:


Risk Factor Score Per Nursin


RFS Level Per Nursing on Admit:  4+=Very High











LISA QUIÑONES DO Dec 2, 2018 11:46

## 2018-12-03 VITALS — DIASTOLIC BLOOD PRESSURE: 59 MMHG | SYSTOLIC BLOOD PRESSURE: 127 MMHG

## 2018-12-03 VITALS — SYSTOLIC BLOOD PRESSURE: 127 MMHG | DIASTOLIC BLOOD PRESSURE: 57 MMHG

## 2018-12-03 VITALS — DIASTOLIC BLOOD PRESSURE: 53 MMHG | SYSTOLIC BLOOD PRESSURE: 110 MMHG

## 2018-12-03 VITALS — DIASTOLIC BLOOD PRESSURE: 57 MMHG | SYSTOLIC BLOOD PRESSURE: 123 MMHG

## 2018-12-03 VITALS — DIASTOLIC BLOOD PRESSURE: 76 MMHG | SYSTOLIC BLOOD PRESSURE: 174 MMHG

## 2018-12-03 VITALS — DIASTOLIC BLOOD PRESSURE: 60 MMHG | SYSTOLIC BLOOD PRESSURE: 133 MMHG

## 2018-12-03 LAB
ALBUMIN SERPL-MCNC: 2 GM/DL (ref 3.2–4.5)
ALP SERPL-CCNC: 171 U/L (ref 40–136)
ALT SERPL-CCNC: 35 U/L (ref 0–55)
BASOPHILS # BLD AUTO: 0 10^3/UL (ref 0–0.1)
BASOPHILS NFR BLD AUTO: 0 % (ref 0–10)
BILIRUB SERPL-MCNC: 0.5 MG/DL (ref 0.1–1)
BUN/CREAT SERPL: 14
CALCIUM SERPL-MCNC: 7.9 MG/DL (ref 8.5–10.1)
CHLORIDE SERPL-SCNC: 106 MMOL/L (ref 98–107)
CO2 SERPL-SCNC: 20 MMOL/L (ref 21–32)
CREAT SERPL-MCNC: 1.08 MG/DL (ref 0.6–1.3)
EOSINOPHIL # BLD AUTO: 0.4 10^3/UL (ref 0–0.3)
EOSINOPHIL NFR BLD AUTO: 4 % (ref 0–10)
ERYTHROCYTE [DISTWIDTH] IN BLOOD BY AUTOMATED COUNT: 16.2 % (ref 10–14.5)
GFR SERPLBLD BASED ON 1.73 SQ M-ARVRAT: > 60 ML/MIN
GLUCOSE SERPL-MCNC: 103 MG/DL (ref 70–105)
HCT VFR BLD CALC: 27 % (ref 40–54)
HGB BLD-MCNC: 9.1 G/DL (ref 13.3–17.7)
LYMPHOCYTES # BLD AUTO: 1.2 X 10^3 (ref 1–4)
LYMPHOCYTES NFR BLD AUTO: 12 % (ref 12–44)
MANUAL DIFFERENTIAL PERFORMED BLD QL: NO
MCH RBC QN AUTO: 30 PG (ref 25–34)
MCHC RBC AUTO-ENTMCNC: 33 G/DL (ref 32–36)
MCV RBC AUTO: 90 FL (ref 80–99)
MONOCYTES # BLD AUTO: 1.1 X 10^3 (ref 0–1)
MONOCYTES NFR BLD AUTO: 11 % (ref 0–12)
NEUTROPHILS # BLD AUTO: 7.4 X 10^3 (ref 1.8–7.8)
NEUTROPHILS NFR BLD AUTO: 73 % (ref 42–75)
PLATELET # BLD: 320 10^3/UL (ref 130–400)
PMV BLD AUTO: 9.9 FL (ref 7.4–10.4)
POTASSIUM SERPL-SCNC: 4.4 MMOL/L (ref 3.6–5)
PROT SERPL-MCNC: 3.8 GM/DL (ref 6.4–8.2)
RBC # BLD AUTO: 3.05 10^6/UL (ref 4.35–5.85)
SODIUM SERPL-SCNC: 132 MMOL/L (ref 135–145)
WBC # BLD AUTO: 10.1 10^3/UL (ref 4.3–11)

## 2018-12-03 RX ADMIN — LISINOPRIL SCH MG: 20 TABLET ORAL at 10:58

## 2018-12-03 RX ADMIN — CYCLOBENZAPRINE HYDROCHLORIDE PRN MG: 10 TABLET, FILM COATED ORAL at 10:07

## 2018-12-03 RX ADMIN — ALLOPURINOL SCH MG: 300 TABLET ORAL at 10:59

## 2018-12-03 RX ADMIN — METFORMIN HYDROCHLORIDE SCH MG: 500 TABLET, FILM COATED ORAL at 16:10

## 2018-12-03 RX ADMIN — BISACODYL PRN MG: 5 TABLET, COATED ORAL at 10:14

## 2018-12-03 RX ADMIN — AMIODARONE HYDROCHLORIDE SCH MG: 200 TABLET ORAL at 10:59

## 2018-12-03 RX ADMIN — SENNOSIDES SCH MG: 8.6 TABLET, FILM COATED ORAL at 08:05

## 2018-12-03 RX ADMIN — DOCUSATE SODIUM SCH MG: 100 CAPSULE ORAL at 22:24

## 2018-12-03 RX ADMIN — PIOGLITAZONE SCH MG: 30 TABLET ORAL at 10:58

## 2018-12-03 RX ADMIN — BETHANECHOL CHLORIDE SCH MG: 10 TABLET ORAL at 10:58

## 2018-12-03 RX ADMIN — OXYBUTYNIN CHLORIDE SCH MG: 5 TABLET ORAL at 13:17

## 2018-12-03 RX ADMIN — PHENAZOPYRIDINE HYDROCHLORIDE SCH MG: 100 TABLET ORAL at 18:06

## 2018-12-03 RX ADMIN — SODIUM CHLORIDE SCH MG: 900 INJECTION, SOLUTION INTRAVENOUS at 08:05

## 2018-12-03 RX ADMIN — METOPROLOL TARTRATE SCH MG: 25 TABLET, FILM COATED ORAL at 10:58

## 2018-12-03 RX ADMIN — DOCUSATE SODIUM SCH MG: 100 CAPSULE ORAL at 08:05

## 2018-12-03 RX ADMIN — Medication SCH EA: at 08:05

## 2018-12-03 RX ADMIN — TAMSULOSIN HYDROCHLORIDE SCH MG: 0.4 CAPSULE ORAL at 18:05

## 2018-12-03 RX ADMIN — BISACODYL PRN MG: 10 SUPPOSITORY RECTAL at 14:58

## 2018-12-03 RX ADMIN — LACTOBACILLUS ACIDOPHILUS / LACTOBACILLUS BULGARICUS SCH GM: 100 MILLION CFU STRENGTH GRANULES at 22:24

## 2018-12-03 RX ADMIN — PHENAZOPYRIDINE HYDROCHLORIDE SCH MG: 100 TABLET ORAL at 13:17

## 2018-12-03 RX ADMIN — SODIUM CHLORIDE SCH MLS/HR: 900 INJECTION, SOLUTION INTRAVENOUS at 22:23

## 2018-12-03 RX ADMIN — OXYBUTYNIN CHLORIDE SCH MG: 5 TABLET ORAL at 22:24

## 2018-12-03 RX ADMIN — AMLODIPINE BESYLATE SCH MG: 5 TABLET ORAL at 10:59

## 2018-12-03 RX ADMIN — ALBUTEROL SULFATE SCH MG: 2.5 SOLUTION RESPIRATORY (INHALATION) at 19:52

## 2018-12-03 RX ADMIN — ALBUTEROL SULFATE SCH MG: 2.5 SOLUTION RESPIRATORY (INHALATION) at 08:08

## 2018-12-03 RX ADMIN — LACTOBACILLUS ACIDOPHILUS / LACTOBACILLUS BULGARICUS SCH GM: 100 MILLION CFU STRENGTH GRANULES at 16:10

## 2018-12-03 RX ADMIN — LACTOBACILLUS ACIDOPHILUS / LACTOBACILLUS BULGARICUS SCH GM: 100 MILLION CFU STRENGTH GRANULES at 10:59

## 2018-12-03 RX ADMIN — FAMOTIDINE SCH MG: 20 TABLET, FILM COATED ORAL at 08:05

## 2018-12-03 RX ADMIN — SIMVASTATIN SCH MG: 10 TABLET, FILM COATED ORAL at 22:24

## 2018-12-03 RX ADMIN — BETHANECHOL CHLORIDE SCH MG: 10 TABLET ORAL at 16:10

## 2018-12-03 RX ADMIN — ALBUTEROL SULFATE SCH MG: 2.5 SOLUTION RESPIRATORY (INHALATION) at 15:37

## 2018-12-03 RX ADMIN — BETHANECHOL CHLORIDE SCH MG: 10 TABLET ORAL at 22:27

## 2018-12-03 RX ADMIN — METOPROLOL TARTRATE SCH MG: 25 TABLET, FILM COATED ORAL at 22:24

## 2018-12-03 RX ADMIN — SENNOSIDES SCH MG: 8.6 TABLET, FILM COATED ORAL at 22:24

## 2018-12-03 NOTE — PHYSICAL THERAPY DAILY NOTE
PT Daily Note-Current


Subjective


Pt awake in chair when PT arrived. Pt agreed to be transferred back to bed.





Pain





   Numeric Pain Scale:  7


   Location:  Posterior


   Location Body Site:  Back


   Pain Description:  Heavy





Mental Status


Patient Orientation:  [place], Normal For Age


Attachments:  Drains, IV





Transfers


Therapy Code Descriptions/Definitions 





Functional [place] Measure:


0=Not Assessed/NA        4=Minimal Assistance


1=Total Assistance        5=Supervision or Setup


2=Maximal Assistance  6=Modified [place]


3=Moderate Assistance 7=Complete [place]








Therapy Quality Codes:


6    Independent with activity with or without an assistive device


5    Patient requires set up or clean up by helper.  Patient completes activity

  by  themselves


4    Supervision or touching assist (CGA). [place] provide cues , steadying 

assist


3    The helper provides less than half the effort to complete the activity


2    The helper provides more than half the effort to complete the activity


1    Dependent.  The helper does all the effort to complete an activity 


7    Patient refused to complete or attempt activity


9    The patient did not perform the activity before the current illness or 

injury


88  Not attempted due to Medical conditions or safety concerns


Transfers (B, C, W/C) (FIM):  2


Scooting:  3


Supine to/from Sit:  2


Sit to/from Stand:  3





Weight Bearing


Right Lower Extremity:  Right


Weight Bearing/Tolerated


Left Lower Extremity:  Left


Weight Bearing/Tolerated





Assessment


Pt is transferred from bedside recliner back to bed with a standing pivot 

transfer. Patient needed cueing in shifting weight prior to transfer and is Mod 

A. Patient requires two therapist in transfer from sit/supine.





PT Short Term Goals


Short Term Goals


Time Frame:  Dec 8, 2018


Transfers (B,C,W/C) (FIM):  3


Gait (FIM):  1


Distance (FIM):  1=up to 49 ft


Gait Distance Comment:  20'


Gait Level of Assist:  3


Gait Assistive Device:  FWW





PT Long Term Goals


Long Term Goals


PT Long Term Goals Time Frame:  Dec 22, 2018


Transfers (B,C,W/C) (FIM):  6


Gait (FIM):  6


Gait distance (FIM):  3=150 ft


Distance:  150'


Gait Level of Assist:  6


Gait Assistive Device:  FWW





PT Plan


Problem List


Problem List:  Activity Tolerance, Functional Strength, Safety, Balance, Gait, 

Transfer, Bed Mobility, ROM





Treatment/Plan


Treatment Plan:  Continue Plan of Care


Treatment Plan:  Bed Mobility, Education, Functional Activity [place], Functional 

Strength, Gait, Safety, Therapeutic Exercise, Transfers


Treatment Duration:  Dec 22, 2018


Frequency:  11 times per week


Estimated Hrs Per Day:  .5 hour per day


Patient and/or Family Agrees t:  Yes





Time/GCodes


Time In:  1125


Time Out:  1138


Total Billed Treatment Time:  13


Total Billed Treatment


1 Visit


FA - 13'











DANA CROOKS PT Dec 3, 2018 11:57

## 2018-12-03 NOTE — PROGRESS NOTE-UROLOGY
Progress Note-Urology


Progress Notes/Assess & Plan


Progress/Assessment & Plan


TOV TODAY AND PLAN PER ORDERS


Final Diagnosis


URINE RETENTION











TAWIL,ELIAS A MD Dec 3, 2018 09:52

## 2018-12-03 NOTE — OCCUPATIONAL THER DAILY NOTE
OT Current Status-Daily Note


Subjective


Pt. states that he is waiting on his pain pill.  Spouse in room.  Does not 

report pain level, but nursing does come in to give him pain medication.





Appearance


Pt. is in bed.  Somewhat groggy and confused.





Mental Status/Objective


Patient Orientation:  Unable to Assess


Therapy Code Descriptions/Definitions 





Functional [place] Measure:


0=Not Assessed/NA        4=Minimal Assistance


1=Total Assistance        5=Supervision or Setup


2=Maximal Assistance  6=Modified [place]


3=Moderate Assistance 7=Complete [place]





Other Treatment


Spouse in room.  States that pt. has just been up and went back to bed.  Pt. is 

encouraged several times to transfer to side of bed again.  Pt. declines and 

states that he can't.  Does agree to bilateral UE exercises with AROM for 

shoulder flexion/elbow flexion, x 15 reps.  Pt. then completes 20 hand squeezes 

with therapy sponge, and then completes 2 bilateral UE exercises x 10 reps each 

with yellow theraband in multiple planes.  TLSO brace has arrived and is 

sitting in chair.  OT encourages pt. to try this on, as he has not had it on 

yet.  Pt. declines, and continues to talk about the woman sitting in that chair 

on her phone.  There is no woman in that chair, and spouse states that pt. has 

been confused.  OT brings brace over to pt. and educates him and spouse on how 

it works, where the straps go, etc... Pt. feels the brace but states that he 

wants to wait until tomorrow.  Nursing lets pt. know that a suppository will be 

given when pt. is able to turn the next time on his side.  Pt. is encouraged to 

roll in bed, lay on side, etc.... Pt. declines at this time.  States that he is 

comfortable where he is.  All needs are met at this time.





Education


OT Patient Education:  Correct positioning, Modified ADL techniques, Progress 

toward Goal/Update tx plan, Purpose of tx/functional activities, Reviewed 

precautions, Rehab process, Transfer techniques


Teaching Recipient:  Patient, Significant Other


Teaching Methods:  Demonstration, Discussion


Response to Teaching:  Verbalize Understanding





OT Short Term Goals


Short Term Goals


Time Frame:  Dec 7, 2018


Eating(FIM):  5


Upper Body Dressing(FIM):  4


Lower Body Dressing(FIM):  3


Transfers (B,C,W/C) (FIM):  3


Additional Short Term Goals:  1-Demonstrate ADL Tasks, 2-Verbalize Understanding

, 3-ImproveStrength/[place]


1=Demonstrate adherence to instructed precautions during ADL tasks.


2=Patient will verbalize/demonstrate understanding of assistive devices/

modifications for ADL.


3=Patient will improve strength/tolerance for activity to enable patient to 

perform ADL's.





OT Long Term Goals


Long Term Goals


Time Frame:  Dec 14, 2018


Grooming(FIM):  5


Upper Body Dressing(FIM):  5


Lower Body Dressing(FIM):  4


Toileting(FIM):  4


Toilet/Commode Transfer(FIM):  4


Additional Goals:  1-Demonstrate ADL Tasks, 2-Verbalize Understanding, 3-

ImproveStrength/[place]


1=Demonstrate adherence to instructed precautions during ADL tasks.


2=Patient will verbalize/demonstrate understanding of assistive devices/

modifications for ADL.


3=Patient will improve strength/tolerance for activity to enable patient to 

perform ADL's.





OT Education/Plan


Problem List/Assessment


Assessment:  Decreased Activ Tolerance, Decreased UE Strength, Dependent 

Transfers, Impaired Bed Mobility, Impaired Cognition, Impaired Funct Balance, 

Impaired I ADL's, Impaired Self-Care Skills, Restricted Funct UE ROM


Pt to benefit from skilled OT intervention for ADL training, transfers, 

strengthening, adaptive equipment training, and safety education to increase 

functional independence.





Discharge Recommendations


Plan/Recommendations:  Continue POC


Therapy D/C Recommendations:  24 hr Supervision





Treatment Plan/Plan of Care


Treatment,Training & Education:  Yes


Patient would benefit from OT for education, treatment and training to promote 

independence in ADL's, mobility, safety and/or upper extremity function for ADL'

s.


Plan of Care:  ADL Retraining, Functional Mobility, UE Funct Exercise/Act


Treatment Duration:  Dec 14, 2018


Frequency:  5 times per week


Estimated Hrs Per Day:  .25 hour per day


Agreement:  Yes


Rehab Potential:  Fair





Time/GCodes


Start Time:  13:10


Stop Time:  13:30


Total Time Billed (hr/min):  20


Billed Treatment Time


1, EX











JOSE MOSS OT Dec 3, 2018 14:18

## 2018-12-03 NOTE — PROGRESS NOTE (SOAP)
Subjective


Date Seen by a Provider:  Dec 3, 2018


Time Seen by a Provider:  13:00


Subjective/Events-last exam


Doing ok, wishes he could have flatus and bowel movements easier.  Hard to walk 

still. Swelling much better in his arms.





Objective


Exam





Vital Signs








  Date Time  Temp Pulse Resp B/P (MAP) Pulse Ox O2 Delivery O2 Flow Rate FiO2


 


12/3/18 12:35 98.6 76 18 127/57 (80) 96 Nasal Cannula 1.00 


 


12/3/18 08:59 98.4 86 20 174/76 (108) 96 Nasal Cannula 1.50 


 


12/3/18 08:09     97 Nasal Cannula 2.00 


 


12/3/18 08:05      Nasal Cannula 1.00 


 


12/3/18 04:00 98.9 88 18 127/59 (81) 93 Nasal Cannula 1.50 


 


12/3/18 00:02 98.4 83 20 133/60 (84) 97 Nasal Cannula 1.50 


 


18 21:15     97 Nasal Cannula 1.50 


 


18 19:41     96 Nasal Cannula 2.00 


 


18 19:30 98.6 87 24 128/58 (81) 96 Nasal Cannula 1.50 


 


18 16:11     99 Nasal Cannula 2.00 


 


18 15:35 99.5 82 20 150/70 (96) 96 Nasal Cannula 4.00 


 


18 14:30      Nasal Cannula 2.00 


 


18 14:00  85 16 124/51 (75) 99 Nasal Cannula 4.00 


 


18 13:00  82      


 


18 13:00  79 16 138/67 (90) 98 Nasal Cannula 4.00 














I & O 


 


 12/3/18





 07:00


 


Intake Total 1380 ml


 


Output Total 1315 ml


 


Balance 65 ml





Capillary Refill :


General Appearance:  No Apparent Distress


Respiratory:  No Accessory Muscle Use, No Respiratory Distress


Cardiovascular:  Regular Rate, Rhythm, Normal Peripheral Pulses


Neurologic/Psychiatric:  Other (Stable)


Skin:  Other (Wound vac in place, looks ok.)





Results


Lab


Laboratory Tests


12/3/18 05:50: 


White Blood Count 10.1, Red Blood Count 3.05L, Hemoglobin 9.1L, Hematocrit 27L, 

Mean Corpuscular Volume 90, Mean Corpuscular Hemoglobin 30, Mean Corpuscular 

Hemoglobin Concent 33, Red Cell Distribution Width 16.2H, Platelet Count 320, 

Mean Platelet Volume 9.9, Neutrophils (%) (Auto) 73, Lymphocytes (%) (Auto) 12, 

Monocytes (%) (Auto) 11, Eosinophils (%) (Auto) 4, Basophils (%) (Auto) 0, 

Neutrophils # (Auto) 7.4, Lymphocytes # (Auto) 1.2, Monocytes # (Auto) 1.1H, 

Eosinophils # (Auto) 0.4H, Basophils # (Auto) 0.0, Sodium Level 132L, Potassium 

Level 4.4, Chloride Level 106, Carbon Dioxide Level 20L, Anion Gap 6, Blood 

Urea Nitrogen 15, Creatinine 1.08, Estimat Glomerular Filtration Rate > 60, BUN/

Creatinine Ratio 14, Glucose Level 103, Calcium Level 7.9L, Corrected Calcium 

9.5, Total Bilirubin 0.5, Aspartate Amino Transf (AST/SGOT) 58H, Alanine 

Aminotransferase (ALT/SGPT) 35, Alkaline Phosphatase 171H, Total Protein 3.8L, 

Albumin 2.0L


12/3/18 12:09: Lab Scanned Report Transfusion Reaction Form





Assessment/Plan


Assessment/Plan


Assess & Plan/Chief Complaint


Lumbar Wound infection with Staph Epi


Lumbar Hardware Failure


Deconditioning


Acute Blood Loss anemia








Continue post op care


Continue to mobilize 


Bowel program





Clinical Quality Measures


DVT/VTE Risk/Contraindication:


Risk Factor Score Per Nursin


RFS Level Per Nursing on Admit:  4+=Very High











ENZO DONALDSON MD Dec 3, 2018 13:02

## 2018-12-03 NOTE — PROGRESS NOTE-HOSPITALIST
Subjective


HPI/CC On Admission


Date Seen by Provider:  Dec 3, 2018


Time Seen by Provider:  08:15


CC: s/p spinal hardware failure replacement





HPI: This is an 78-year-old white male who was transferred from inpatient 

rehabilitation department to ICU for close monitoring following spinal hardware 

failure replacement yesterday.  He had an uncomplicated surgery and is 

currently recovering and doing well overall.  Urinary output has decreased and 

blood pressure is 115 so will initiate low flow IV fluid of normal saline and 

encouraged him to take in nutrition and fluids today.  He will be fitted for a 

shell back brace and then we will get him out of bed.  Overall patient is doing 

much better wife was updated and patient feels better overall.


Subjective/Events-last exam


Patient having some delirium at night


Bowels are moving


Eating better


Nebulizer treatments of cleared lungs


Labs noted


Inpatient rehabilitation eval





Review of Systems


General:  Fatigue


Neurological:  Confusion





Objective


Exam


Vital Signs





Vital Signs








  Date Time  Temp Pulse Resp B/P (MAP) Pulse Ox O2 Delivery O2 Flow Rate FiO2


 


12/3/18 08:59 98.4 86 20 174/76 (108) 96 Nasal Cannula 1.50 





Capillary Refill :


General Appearance:  No Apparent Distress, WD/WN, Chronically ill


Respiratory:  Chest Non Tender, Lungs Clear, Normal Breath Sounds, No Accessory 

Muscle Use, No Respiratory Distress


Cardiovascular:  Regular Rate, Rhythm, No Edema, No Gallop, No JVD, No Murmur, 

Normal Peripheral Pulses


Back:  Decreased Range of Motion


Neurologic/Psychiatric:  Alert, Oriented x3, No Motor/Sensory Deficits, Normal 

Mood/Affect





Results/Procedures


Lab


Laboratory Tests


12/3/18 05:50








Patient resulted labs reviewed.





Assessment/Plan


Assessment and Plan


Assess & Plan/Chief Complaint


Assessment:


Recurrent severe back pain with referred lower abdominal pain similar to 

initial presentation obtained labs and CT scan and Dr Bustos consultation which 

revealed displaced hardware in need of repeat surgery s/p on 18 POD # 4


Debility following spinal abscess status post 2 I&D's uncomplicated per Dr Bustos


Urinary retention acute maintained with panda but Dr Tawil consulted and he 

will see before DC catheter


C. difficile colitis resolved


Severe anemia due to acute blood loss status post 4 units of blood while on 

MedSurg and ICU then 1 unit  then requiring another 2 units 2 days ago


CAD


DM


CRI


Delirium





Plan:


Pain control


Increase PO nutrition and fluids


Use IS faithfully


Monitor cough but cont nebs


BM regimen


Nebs


IRF





Diagnosis/Problems


Diagnosis/Problems





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


Status:  Resolved


Resolution Date/Time:  18 @ 12:52


(2) Intraspinal abscess and granuloma


Status:  Resolved


Resolution Date/Time:  18 @ 14:02


(3) Urinary retention


Status:  Acute


(4) Transfusion of blood during current hospitalization


Status:  Acute


(5) Advanced age


Status:  Chronic


(6) C. difficile colitis


Status:  Acute


(7) Renal insufficiency


Status:  Resolved


Resolution Date/Time:  18 @ 09:41


(8) Leukocytosis


Status:  Resolved


Resolution Date/Time:  18 @ 14:02


(9) CAD (coronary artery disease)


Status:  Chronic


(10) Atrial fibrillation


Status:  Chronic


Qualifiers:  


   Atrial fibrillation type:  unspecified  Qualified Codes:  I48.91 - 

Unspecified atrial fibrillation


(11) Delirium


Status:  Acute





Clinical Quality Measures


DVT/VTE Risk/Contraindication:


Risk Factor Score Per Nursin


RFS Level Per Nursing on Admit:  4+=Very High











LISA QUIÑONES DO Dec 3, 2018 08:38

## 2018-12-03 NOTE — PHYSICAL THERAPY DAILY NOTE
PT Daily Note-Current


Subjective


Pt awake in bed watching tv with his wife when PT entered room. Pt requested 

assistance in transferring to commode.





Pain





   Numeric Pain Scale:  7


   Location:  Posterior


   Location Body Site:  Back


   Pain Description:  Acute





Mental Status


Patient Orientation:  [place], Normal For Age


Attachments:  Drains, Luis Catheter, IV





Transfers


Therapy Code Descriptions/Definitions 





Functional [place] Measure:


0=Not Assessed/NA        4=Minimal Assistance


1=Total Assistance        5=Supervision or Setup


2=Maximal Assistance  6=Modified [place]


3=Moderate Assistance 7=Complete [place]








Therapy Quality Codes:


6    Independent with activity with or without an assistive device


5    Patient requires set up or clean up by helper.  Patient completes activity

  by  themselves


4    Supervision or touching assist (CGA). [place] provide cues , steadying 

assist


3    The helper provides less than half the effort to complete the activity


2    The helper provides more than half the effort to complete the activity


1    Dependent.  The helper does all the effort to complete an activity 


7    Patient refused to complete or attempt activity


9    The patient did not perform the activity before the current illness or 

injury


88  Not attempted due to Medical conditions or safety concerns


Transfers (B, C, W/C) (FIM):  3


Scooting:  3


Rolling:  3


Supine to/from Sit:  3


Sit to/from Stand:  3


bed to commode to recliner





Weight Bearing


Right Lower Extremity:  Right


Weight Bearing/Tolerated


Left Lower Extremity:  Left


Weight Bearing/Tolerated





Exercises


Supine Ex:  Heel Slides, Hip abd/add





Assessment


Pt was able to perform LE exercises prior to being transferred to commode. Pt 

required Mod A to transfer from supine to sitting EOB. Patient is Mod A in sit/

stand when being transferred over to commode. Pt will continue therapy to 

increase overall level of function. Patient self limits due to pain.





PT Short Term Goals


Short Term Goals


Time Frame:  Dec 8, 2018


Transfers (B,C,W/C) (FIM):  3


Gait (FIM):  1


Distance (FIM):  1=up to 49 ft


Gait Distance Comment:  20'


Gait Level of Assist:  3


Gait Assistive Device:  FWW





PT Long Term Goals


Long Term Goals


PT Long Term Goals Time Frame:  Dec 22, 2018


Transfers (B,C,W/C) (FIM):  6


Gait (FIM):  6


Gait distance (FIM):  3=150 ft


Distance:  150'


Gait Level of Assist:  6


Gait Assistive Device:  FWW





PT Plan


Problem List


Problem List:  Activity Tolerance, Functional Strength, Safety, Balance, Gait, 

Transfer, Bed Mobility





Treatment/Plan


Treatment Plan:  Continue Plan of Care


Treatment Plan:  Bed Mobility, Education, Functional Activity Samina, Functional 

Strength, Gait, Safety, Therapeutic Exercise, Transfers


Treatment Duration:  Dec 22, 2018


Frequency:  11 times per week


Estimated Hrs Per Day:  .5 hour per day


Patient and/or Family Agrees t:  Yes





Time/GCodes


Time In:  930


Time Out:  949


Total Billed Treatment Time:  19


Total Billed Treatment


1 Visit


FA - 19'











DANA CROOKS PT Dec 3, 2018 10:39

## 2018-12-04 VITALS — DIASTOLIC BLOOD PRESSURE: 75 MMHG | SYSTOLIC BLOOD PRESSURE: 144 MMHG

## 2018-12-04 VITALS — SYSTOLIC BLOOD PRESSURE: 118 MMHG | DIASTOLIC BLOOD PRESSURE: 57 MMHG

## 2018-12-04 VITALS — DIASTOLIC BLOOD PRESSURE: 53 MMHG | SYSTOLIC BLOOD PRESSURE: 124 MMHG

## 2018-12-04 VITALS — SYSTOLIC BLOOD PRESSURE: 107 MMHG | DIASTOLIC BLOOD PRESSURE: 55 MMHG

## 2018-12-04 VITALS — DIASTOLIC BLOOD PRESSURE: 62 MMHG | SYSTOLIC BLOOD PRESSURE: 109 MMHG

## 2018-12-04 VITALS — SYSTOLIC BLOOD PRESSURE: 127 MMHG | DIASTOLIC BLOOD PRESSURE: 64 MMHG

## 2018-12-04 VITALS — DIASTOLIC BLOOD PRESSURE: 61 MMHG | SYSTOLIC BLOOD PRESSURE: 116 MMHG

## 2018-12-04 RX ADMIN — LACTOBACILLUS ACIDOPHILUS / LACTOBACILLUS BULGARICUS SCH GM: 100 MILLION CFU STRENGTH GRANULES at 06:38

## 2018-12-04 RX ADMIN — LACTOBACILLUS ACIDOPHILUS / LACTOBACILLUS BULGARICUS SCH GM: 100 MILLION CFU STRENGTH GRANULES at 21:19

## 2018-12-04 RX ADMIN — DOCUSATE SODIUM SCH MG: 100 CAPSULE ORAL at 08:27

## 2018-12-04 RX ADMIN — HALOPERIDOL LACTATE PRN MG: 5 INJECTION, SOLUTION INTRAMUSCULAR at 21:20

## 2018-12-04 RX ADMIN — BETHANECHOL CHLORIDE SCH MG: 10 TABLET ORAL at 06:38

## 2018-12-04 RX ADMIN — AMLODIPINE BESYLATE SCH MG: 5 TABLET ORAL at 08:26

## 2018-12-04 RX ADMIN — ALLOPURINOL SCH MG: 300 TABLET ORAL at 08:26

## 2018-12-04 RX ADMIN — METFORMIN HYDROCHLORIDE SCH MG: 500 TABLET, FILM COATED ORAL at 17:45

## 2018-12-04 RX ADMIN — BETHANECHOL CHLORIDE SCH MG: 25 TABLET ORAL at 21:23

## 2018-12-04 RX ADMIN — TAMSULOSIN HYDROCHLORIDE SCH MG: 0.4 CAPSULE ORAL at 17:45

## 2018-12-04 RX ADMIN — INSULIN ASPART SCH UNIT: 100 INJECTION, SOLUTION INTRAVENOUS; SUBCUTANEOUS at 11:35

## 2018-12-04 RX ADMIN — OXYBUTYNIN CHLORIDE SCH MG: 5 TABLET ORAL at 21:19

## 2018-12-04 RX ADMIN — PHENAZOPYRIDINE HYDROCHLORIDE SCH MG: 100 TABLET ORAL at 13:13

## 2018-12-04 RX ADMIN — SIMVASTATIN SCH MG: 10 TABLET, FILM COATED ORAL at 21:19

## 2018-12-04 RX ADMIN — BETHANECHOL CHLORIDE SCH MG: 25 TABLET ORAL at 16:22

## 2018-12-04 RX ADMIN — METFORMIN HYDROCHLORIDE SCH MG: 500 TABLET, FILM COATED ORAL at 06:38

## 2018-12-04 RX ADMIN — METOPROLOL TARTRATE SCH MG: 25 TABLET, FILM COATED ORAL at 21:19

## 2018-12-04 RX ADMIN — ALBUTEROL SULFATE SCH MG: 2.5 SOLUTION RESPIRATORY (INHALATION) at 09:42

## 2018-12-04 RX ADMIN — FAMOTIDINE SCH MG: 20 TABLET, FILM COATED ORAL at 08:27

## 2018-12-04 RX ADMIN — RISPERIDONE SCH MG: 0.25 TABLET, FILM COATED ORAL at 11:31

## 2018-12-04 RX ADMIN — BETHANECHOL CHLORIDE SCH MG: 25 TABLET ORAL at 11:27

## 2018-12-04 RX ADMIN — PHENAZOPYRIDINE HYDROCHLORIDE SCH MG: 100 TABLET ORAL at 17:45

## 2018-12-04 RX ADMIN — PIOGLITAZONE SCH MG: 30 TABLET ORAL at 06:38

## 2018-12-04 RX ADMIN — Medication SCH EA: at 06:38

## 2018-12-04 RX ADMIN — DOCUSATE SODIUM SCH MG: 100 CAPSULE ORAL at 21:19

## 2018-12-04 RX ADMIN — PHENAZOPYRIDINE HYDROCHLORIDE SCH MG: 100 TABLET ORAL at 08:26

## 2018-12-04 RX ADMIN — LACTOBACILLUS ACIDOPHILUS / LACTOBACILLUS BULGARICUS SCH GM: 100 MILLION CFU STRENGTH GRANULES at 11:27

## 2018-12-04 RX ADMIN — OXYBUTYNIN CHLORIDE SCH MG: 5 TABLET ORAL at 13:13

## 2018-12-04 RX ADMIN — OXYBUTYNIN CHLORIDE SCH MG: 5 TABLET ORAL at 08:27

## 2018-12-04 RX ADMIN — INSULIN ASPART SCH UNIT: 100 INJECTION, SOLUTION INTRAVENOUS; SUBCUTANEOUS at 20:58

## 2018-12-04 RX ADMIN — METOPROLOL TARTRATE SCH MG: 25 TABLET, FILM COATED ORAL at 08:26

## 2018-12-04 RX ADMIN — AMIODARONE HYDROCHLORIDE SCH MG: 200 TABLET ORAL at 08:26

## 2018-12-04 RX ADMIN — SENNOSIDES SCH MG: 8.6 TABLET, FILM COATED ORAL at 21:19

## 2018-12-04 RX ADMIN — SENNOSIDES SCH MG: 8.6 TABLET, FILM COATED ORAL at 08:26

## 2018-12-04 RX ADMIN — ALBUTEROL SULFATE SCH MG: 2.5 SOLUTION RESPIRATORY (INHALATION) at 20:25

## 2018-12-04 RX ADMIN — LISINOPRIL SCH MG: 20 TABLET ORAL at 08:27

## 2018-12-04 RX ADMIN — ALBUTEROL SULFATE SCH MG: 2.5 SOLUTION RESPIRATORY (INHALATION) at 14:28

## 2018-12-04 RX ADMIN — RISPERIDONE SCH MG: 0.25 TABLET, FILM COATED ORAL at 21:19

## 2018-12-04 RX ADMIN — ENOXAPARIN SODIUM SCH MG: 100 INJECTION SUBCUTANEOUS at 11:27

## 2018-12-04 RX ADMIN — LACTOBACILLUS ACIDOPHILUS / LACTOBACILLUS BULGARICUS SCH GM: 100 MILLION CFU STRENGTH GRANULES at 16:22

## 2018-12-04 RX ADMIN — INSULIN ASPART SCH UNIT: 100 INJECTION, SOLUTION INTRAVENOUS; SUBCUTANEOUS at 16:45

## 2018-12-04 NOTE — PHYSICAL THERAPY DAILY NOTE
PT Daily Note-Current


Subjective


Patient awake in bed working with RT when PT arrived. Pt agreed to continue 

with therapy.





Pain





   Numeric Pain Scale:  0-No Pain


   Location:  No Pain Reported


   Location Body Site:  Back


   Pain Description:  Acute





Mental Status


Patient Orientation:  Confused, Mumbles


Attachments:  IV





Transfers


Therapy Code Descriptions/Definitions 





Functional [place] Measure:


0=Not Assessed/NA        4=Minimal Assistance


1=Total Assistance        5=Supervision or Setup


2=Maximal Assistance  6=Modified [place]


3=Moderate Assistance 7=Complete Power








Therapy Quality Codes:


6    Independent with activity with or without an assistive device


5    Patient requires set up or clean up by helper.  Patient completes activity

  by  themselves


4    Supervision or touching assist (CGA). [place] provide cues , steadying 

assist


3    The helper provides less than half the effort to complete the activity


2    The helper provides more than half the effort to complete the activity


1    Dependent.  The helper does all the effort to complete an activity 


7    Patient refused to complete or attempt activity


9    The patient did not perform the activity before the current illness or 

injury


88  Not attempted due to Medical conditions or safety concerns





Weight Bearing


Right Lower Extremity:  Right


Weight Bearing/Tolerated


Left Lower Extremity:  Left


Weight Bearing/Tolerated





Exercises


Supine Ex:  Ankle pumps, Knee to chest, Hip abd/add


Supine Reps:  20





Assessment


Pt is able to perform AAROM LE exercises in supine. Pt will continue therapy to 

maintain current level of function. Due to confusion and lethargy, patient is 

unsafe to perform OOB activities.





PT Short Term Goals


Short Term Goals


Time Frame:  Dec 8, 2018


Transfers (B,C,W/C) (FIM):  3


Gait (FIM):  1


Distance (FIM):  1=up to 49 ft


Gait Distance Comment:  20'


Gait Level of Assist:  3


Gait Assistive Device:  FWW





PT Long Term Goals


Long Term Goals


PT Long Term Goals Time Frame:  Dec 22, 2018


Transfers (B,C,W/C) (FIM):  6


Gait (FIM):  6


Gait distance (FIM):  3=150 ft


Distance:  150'


Gait Level of Assist:  6


Gait Assistive Device:  FWW





PT Plan


Problem List


Problem List:  Activity Tolerance, Functional Strength, Safety, Balance, Gait, 

Transfer, Bed Mobility, ROM





Treatment/Plan


Treatment Plan:  Continue Plan of Care


Treatment Plan:  Bed Mobility, Education, Functional Activity Samina, Functional 

Strength, Gait, Safety, Therapeutic Exercise, Transfers


Treatment Duration:  Dec 22, 2018


Frequency:  11 times per week


Estimated Hrs Per Day:  .5 hour per day


Patient and/or Family Agrees t:  Yes





Time/GCodes


Time In:  1430


Time Out:  1440


Total Billed Treatment Time:  10


Total Billed Treatment


1 Visit


EX - 10'











DANA CROOKS PT Dec 4, 2018 14:56

## 2018-12-04 NOTE — PROGRESS NOTE-HOSPITALIST
NURIALISA DO 18 0959:


Subjective


HPI/CC On Admission


Date Seen by Provider:  Dec 4, 2018


Time Seen by Provider:  09:00


CC: s/p spinal hardware failure replacement





HPI: This is an 78-year-old white male who was transferred from inpatient 

rehabilitation department to ICU for close monitoring following spinal hardware 

failure replacement yesterday.  He had an uncomplicated surgery and is 

currently recovering and doing well overall.  Urinary output has decreased and 

blood pressure is 115 so will initiate low flow IV fluid of normal saline and 

encouraged him to take in nutrition and fluids today.  He will be fitted for a 

shell back brace and then we will get him out of bed.  Overall patient is doing 

much better wife was updated and patient feels better overall.


Subjective/Events-last exam


Patient experiencing delirium


Pt has been in the hospital and NH for 2 months contributing to this issue


Fentanyl patch was replaced last night and his wife noted the increase in 

confusion so will stop Fentanyl patch and start on low dose Risperdal


Pt angry and hallucinating





Review of Systems


General:  Fatigue


Musculoskeletal:  back pain


Neurological:  Confusion





Objective


Exam


Vital Signs





Vital Signs








  Date Time  Temp Pulse Resp B/P (MAP) Pulse Ox O2 Delivery O2 Flow Rate FiO2


 


18 16:00 98.1 71 18 109/62 (78) 97 Nasal Cannula 1.00 





Capillary Refill :


General Appearance:  No Apparent Distress, WD/WN, Chronically ill


Respiratory:  Chest Non Tender, Lungs Clear, Normal Breath Sounds, No Accessory 

Muscle Use, No Respiratory Distress


Cardiovascular:  Regular Rate, Rhythm, No Edema, No Gallop, No JVD, No Murmur, 

Normal Peripheral Pulses


Gastrointestinal:  Normal Bowel Sounds, No Organomegaly, No Pulsatile Mass, Non 

Tender, Soft


Neurologic/Psychiatric:  Alert, No Motor/Sensory Deficits, CNs II-XII Norm as 

Tested, Disoriented


Skin:  Normal Color, Warm/Dry





Results/Procedures


Lab


Patient resulted labs reviewed.





Assessment/Plan


Assessment and Plan


Assess & Plan/Chief Complaint


Assessment:


Acute and severe delirium will stop Fentanyl patch and start Risperdal


Recurrent severe back pain with referred lower abdominal pain similar to 

initial presentation obtained labs and CT scan and Dr Bustos consultation which 

revealed displaced hardware in need of repeat surgery s/p on 18 POD # 5


Debility following spinal abscess status post 2 I&D's uncomplicated per Dr Bustos


Urinary retention acute maintained with panda but Dr Tawil consulted and he 

will see before DC catheter


C. difficile colitis resolved


Severe anemia due to acute blood loss status post 4 units of blood while on 

MedSurg and ICU then 1 unit  then requiring another 2 units 3 days ago


CAD


DM


CRI


Delirium





Plan:


Pain control but will stop Fentanyl patch since I suspect that to be the main 

factor in delirium


Increase PO nutrition and fluids


Use IS faithfully


Monitor cough but cont nebs


BM regimen


Nebs


IRF if delirium resolves





Diagnosis/Problems


Diagnosis/Problems





(1) Delirium


Status:  Acute


(2) Loosening of hardware in spine


Status:  Resolved


Resolution Date/Time:  18 @ 12:52


(3) Intraspinal abscess and granuloma


Status:  Resolved


Resolution Date/Time:  18 @ 14:02


(4) Urinary retention


Status:  Acute


(5) Transfusion of blood during current hospitalization


Status:  Acute


(6) Advanced age


Status:  Chronic


(7) C. difficile colitis


Status:  Acute


(8) Renal insufficiency


Status:  Resolved


Resolution Date/Time:  18 @ 09:41


(9) Leukocytosis


Status:  Resolved


Resolution Date/Time:  18 @ 14:02


(10) CAD (coronary artery disease)


Status:  Chronic


(11) Atrial fibrillation


Status:  Chronic


Qualifiers:  


   Atrial fibrillation type:  unspecified  Qualified Codes:  I48.91 - 

Unspecified atrial fibrillation





Clinical Quality Measures


DVT/VTE Risk/Contraindication:


Risk Factor Score Per Nursin


RFS Level Per Nursing on Admit:  4+=Very High





MACHO QUINTERO MED STUDENT 18 1013:


Subjective


Subjective/Events-last exam


Patient is very confused today


States that he is experiencing pain with ambulation


Pain is well controlled otherwise


Patient is aggravated with staff and wanting to leave





Objective


Exam


General Appearance:  No Apparent Distress, WD/WN


Respiratory:  Chest Non Tender, Lungs Clear, Normal Breath Sounds, No Accessory 

Muscle Use, No Respiratory Distress


Cardiovascular:  Regular Rate, Rhythm, No Edema, No Gallop, No JVD, No Murmur, 

Normal Peripheral Pulses


Gastrointestinal:  Normal Bowel Sounds, Non Tender


Neurologic/Psychiatric:  Alert, Disoriented


Skin:  Normal Color, Warm/Dry





Assessment/Plan


Assessment and Plan


Assess & Plan/Chief Complaint





Assessment:


1) Debility due to repeated back surgery


2) Spinal abscess


3) Delerium


4) Diabetes mellitus





Plan:


1) Continue to monitor


2) Physical rehabilitation


3) Risperdal for delirium











LISA QUIÑONES DO Dec 4, 2018 09:59


MACHO QUINTERO MED STUDENT Dec 4, 2018 10:13

## 2018-12-04 NOTE — PROGRESS NOTE-UROLOGY
Progress Note-Urology


Progress Notes/Assess & Plan


Progress/Assessment & Plan


DID NOT VOID ON OWN YET. STRAIGHT CATH TWICE  AND 1150CC. URECHOLINE 

INCREASED TO 25 QID. TOLERATED WELL PLAN PER ORDERS


Final Diagnosis


URINE RETENTION











TAWIL,ELIAS A MD Dec 4, 2018 10:09

## 2018-12-04 NOTE — PHYSICAL THERAPY DAILY NOTE
PT Daily Note-Current


Subjective


Pt awake in bed with wife when PT entered room. Patients wife reported Pt had 

an adverse reaction to pain medication and has been unstable recently. Patient 

will be kept in bed for supine exercises.





Mental Status


Patient Orientation:  Confused





Transfers


Therapy Code Descriptions/Definitions 





Functional [place] Measure:


0=Not Assessed/NA        4=Minimal Assistance


1=Total Assistance        5=Supervision or Setup


2=Maximal Assistance  6=Modified [place]


3=Moderate Assistance 7=Complete [place]








Therapy Quality Codes:


6    Independent with activity with or without an assistive device


5    Patient requires set up or clean up by helper.  Patient completes activity

  by  themselves


4    Supervision or touching assist (CGA). [place] provide cues , steadying 

assist


3    The helper provides less than half the effort to complete the activity


2    The helper provides more than half the effort to complete the activity


1    Dependent.  The helper does all the effort to complete an activity 


7    Patient refused to complete or attempt activity


9    The patient did not perform the activity before the current illness or 

injury


88  Not attempted due to Medical conditions or safety concerns





Weight Bearing


Right Lower Extremity:  Right


Weight Bearing/Tolerated


Left Lower Extremity:  Left


Weight Bearing/Tolerated





Exercises


Supine Ex:  Ankle pumps, Heel Slides, Straight leg raise, Hip abd/add





Assessment


Patient currently confused and had difficulty following directions. Patient 

taken through PROM for both LEs and was able to perform some AAROM. Patient 

will continue PT to maintain current level of function.





PT Short Term Goals


Short Term Goals


Time Frame:  Dec 8, 2018


Transfers (B,C,W/C) (FIM):  3


Gait (FIM):  1


Distance (FIM):  1=up to 49 ft


Gait Distance Comment:  20'


Gait Level of Assist:  3


Gait Assistive Device:  FWW





PT Long Term Goals


Long Term Goals


PT Long Term Goals Time Frame:  Dec 22, 2018


Transfers (B,C,W/C) (FIM):  6


Gait (FIM):  6


Gait distance (FIM):  3=150 ft


Distance:  150'


Gait Level of Assist:  6


Gait Assistive Device:  FWW





PT Plan


Problem List


Problem List:  Activity Tolerance, Functional Strength, Safety, Balance, Gait, 

Transfer, Bed Mobility, ROM





Treatment/Plan


Treatment Plan:  Continue Plan of Care


Treatment Plan:  Bed Mobility, Education, Functional Activity Samina, Functional 

Strength, Gait, Safety, Therapeutic Exercise, Transfers


Treatment Duration:  Dec 22, 2018


Frequency:  11 times per week


Estimated Hrs Per Day:  .5 hour per day


Patient and/or Family Agrees t:  Yes





Time/GCodes


Time In:  1110


Time Out:  1121


Total Billed Treatment Time:  11


Total Billed Treatment


1 Visit


EX - 11'











DANA CROOKS PT Dec 4, 2018 11:51

## 2018-12-04 NOTE — OCCUPATIONAL THER DAILY NOTE
OT Current Status-Daily Note


Subjective


Pt in bed, agrees to therapy. Pt reports 8/10 pain.





Mental Status/Objective


Patient Orientation:  Confused


Therapy Code Descriptions/Definitions 





Functional [place] Measure:


0=Not Assessed/NA        4=Minimal Assistance


1=Total Assistance        5=Supervision or Setup


2=Maximal Assistance  6=Modified [place]


3=Moderate Assistance 7=Complete [place]





ADL-Treatment


Therapy Code Descriptions/Definitions 





Functional [place] Measure:


0=Not Assessed/NA        4=Minimal Assistance


1=Total Assistance        5=Supervision or Setup


2=Maximal Assistance  6=Modified [place]


3=Moderate Assistance 7=Complete [place]








Therapy Quality Codes:


6    Independent with activity with or without an assistive device


5    Patient requires set up or clean up by helper.  Patient completes activity

  by  themselves


4    Supervision or touching assist (CGA). [place] provide cues , steadying 

assist


3    The helper provides less than half the effort to complete the activity


2    The helper provides more than half the effort to complete the activity


1    Dependent.  The helper does all the effort to complete an activity 


7    Patient refused to complete or attempt activity


9    The patient did not perform the activity before the current illness or 

injury


88  Not attempted due to Medical conditions or safety concerns





Other Treatment


Pt participated in bilateral UE exercises to increase strength and activity 

tolerance needed for functional task completion. Pt performed AROM x15 reps in 

all planes with rest breaks between exercises. Pt is easily distracted and 

requires cues to stay on task. Pt is confused and has some difficulty following 

cues for exercise technique. Requires verbal and tactile cues for completion. 

Bilateral hand  exercises x20 reps with moderate resistance therapy foam to 

increase  strength.  Pt is confused during session and talks about a man 

sitting in the chair even though there is no one in his room. Pt resting in bed 

with needs met and nurse aide present after session.





OT Short Term Goals


Short Term Goals


Time Frame:  Dec 7, 2018


Eating(FIM):  5


Upper Body Dressing(FIM):  4


Lower Body Dressing(FIM):  3


Transfers (B,C,W/C) (FIM):  3


Additional Short Term Goals:  1-Demonstrate ADL Tasks, 2-Verbalize Understanding

, 3-ImproveStrength/Samina


1=Demonstrate adherence to instructed precautions during ADL tasks.


2=Patient will verbalize/demonstrate understanding of assistive devices/

modifications for ADL.


3=Patient will improve strength/tolerance for activity to enable patient to 

perform ADL's.





OT Long Term Goals


Long Term Goals


Time Frame:  Dec 14, 2018


Groomin


Upper Body Dressing(FIM):  5


Lower Body Dressing(FIM):  4


Toileting(FIM):  4


Toilet/Commode Transfer(FIM):  4


Additional Goals:  1-Demonstrate ADL Tasks, 2-Verbalize Understanding, 3-

ImproveStrength/Samina


1=Demonstrate adherence to instructed precautions during ADL tasks.


2=Patient will verbalize/demonstrate understanding of assistive devices/

modifications for ADL.


3=Patient will improve strength/tolerance for activity to enable patient to 

perform ADL's.





OT Education/Plan


Problem List/Assessment


Pt to benefit from skilled OT intervention for ADL training, transfers, 

strengthening, adaptive equipment training, and safety education to increase 

functional independence.





Discharge Recommendations


Plan/Recommendations:  Continue POC





Treatment Plan/Plan of Care


Patient would benefit from OT for education, treatment and training to promote 

independence in ADL's, mobility, safety and/or upper extremity function for ADL'

s.


Plan of Care:  ADL Retraining, Functional Mobility, UE Funct Exercise/Act


Treatment Duration:  Dec 14, 2018


Frequency:  5 times per week


Estimated Hrs Per Day:  .25 hour per day


Agreement:  Yes


Rehab Potential:  Fair





Time/GCodes


Start Time:  13:30


Stop Time:  13:43


Total Time Billed (hr/min):  13


Billed Treatment Time


1 visit, EX(13minutes)











MANJINDER GIRON OT Dec 4, 2018 14:02

## 2018-12-04 NOTE — PROGRESS NOTE (SOAP)
Subjective


Date Seen by a Provider:  Dec 4, 2018


Time Seen by a Provider:  06:51


Subjective/Events-last exam


No new complaints





Objective


Exam





Vital Signs








  Date Time  Temp Pulse Resp B/P (MAP) Pulse Ox O2 Delivery O2 Flow Rate FiO2


 


18 04:00 98.1 76 20 124/53 (76) 94 Nasal Cannula 1.00 


 


18 00:00 98.2 68 20 118/57 (77) 96 Nasal Cannula 1.00 


 


12/3/18 21:00      Nasal Cannula 1.00 


 


12/3/18 20:00 98.0 69 18 110/53 (72) 93 Room Air  


 


12/3/18 16:30 99.1 68 18 123/57 (79) 98 Room Air  


 


12/3/18 12:35 98.6 76 18 127/57 (80) 96 Nasal Cannula 1.00 


 


12/3/18 08:59 98.4 86 20 174/76 (108) 96 Nasal Cannula 1.50 


 


12/3/18 08:09     97 Nasal Cannula 2.00 


 


12/3/18 08:05      Nasal Cannula 1.00 














I & O 


 


 18





 07:00


 


Intake Total 1580 ml


 


Output Total 500 ml


 


Balance 1080 ml





Capillary Refill :


General Appearance:  No Apparent Distress, Obese (Up in chair, comfortable)


Respiratory:  No Accessory Muscle Use, No Respiratory Distress


Cardiovascular:  Normal Peripheral Pulses, Other (Swelling much better)


Gastrointestinal:  non tender, soft


Extremity:  Normal Capillary Refill, No Calf Tenderness


Neurologic/Psychiatric:  Other (Neuro unchanged)





Results


Lab


Laboratory Tests


12/3/18 12:09: Lab Scanned Report Transfusion Reaction Form





Assessment/Plan


Assessment/Plan


Assess & Plan/Chief Complaint


Lumbar Wound infection with Staph Epi


Lumbar Hardware Failure


Deconditioning


Acute Blood Loss anemia








Continue current care





Clinical Quality Measures


DVT/VTE Risk/Contraindication:


Risk Factor Score Per Nursin


RFS Level Per Nursing on Admit:  4+=Very High











ENZO DONALDSON MD Dec 4, 2018 06:53

## 2018-12-05 VITALS — SYSTOLIC BLOOD PRESSURE: 111 MMHG | DIASTOLIC BLOOD PRESSURE: 57 MMHG

## 2018-12-05 VITALS — DIASTOLIC BLOOD PRESSURE: 55 MMHG | SYSTOLIC BLOOD PRESSURE: 104 MMHG

## 2018-12-05 VITALS — SYSTOLIC BLOOD PRESSURE: 135 MMHG | DIASTOLIC BLOOD PRESSURE: 62 MMHG

## 2018-12-05 VITALS — DIASTOLIC BLOOD PRESSURE: 55 MMHG | SYSTOLIC BLOOD PRESSURE: 108 MMHG

## 2018-12-05 VITALS — SYSTOLIC BLOOD PRESSURE: 153 MMHG | DIASTOLIC BLOOD PRESSURE: 67 MMHG

## 2018-12-05 LAB
ALBUMIN SERPL-MCNC: 2.6 GM/DL (ref 3.2–4.5)
ALP SERPL-CCNC: 205 U/L (ref 40–136)
ALT SERPL-CCNC: 42 U/L (ref 0–55)
AMORPH SED URNS QL MICRO: (no result) /LPF
APTT PPP: (no result) S
BACTERIA #/AREA URNS HPF: (no result) /HPF
BASOPHILS # BLD AUTO: 0 10^3/UL (ref 0–0.1)
BASOPHILS NFR BLD AUTO: 0 % (ref 0–10)
BILIRUB SERPL-MCNC: 0.6 MG/DL (ref 0.1–1)
BILIRUB UR QL STRIP: (no result)
BUN/CREAT SERPL: 11
CALCIUM SERPL-MCNC: 8.8 MG/DL (ref 8.5–10.1)
CHLORIDE SERPL-SCNC: 103 MMOL/L (ref 98–107)
CO2 SERPL-SCNC: 22 MMOL/L (ref 21–32)
CREAT SERPL-MCNC: 1.09 MG/DL (ref 0.6–1.3)
EOSINOPHIL # BLD AUTO: 0.4 10^3/UL (ref 0–0.3)
EOSINOPHIL NFR BLD AUTO: 4 % (ref 0–10)
ERYTHROCYTE [DISTWIDTH] IN BLOOD BY AUTOMATED COUNT: 16.4 % (ref 10–14.5)
FIBRINOGEN PPP-MCNC: CLEAR MG/DL
GFR SERPLBLD BASED ON 1.73 SQ M-ARVRAT: > 60 ML/MIN
GLUCOSE SERPL-MCNC: 115 MG/DL (ref 70–105)
GLUCOSE UR STRIP-MCNC: NEGATIVE MG/DL
HCT VFR BLD CALC: 31 % (ref 40–54)
HGB BLD-MCNC: 10.2 G/DL (ref 13.3–17.7)
KETONES UR QL STRIP: NEGATIVE
LEUKOCYTE ESTERASE UR QL STRIP: (no result)
LYMPHOCYTES # BLD AUTO: 1.1 X 10^3 (ref 1–4)
LYMPHOCYTES NFR BLD AUTO: 11 % (ref 12–44)
MANUAL DIFFERENTIAL PERFORMED BLD QL: NO
MCH RBC QN AUTO: 30 PG (ref 25–34)
MCHC RBC AUTO-ENTMCNC: 33 G/DL (ref 32–36)
MCV RBC AUTO: 91 FL (ref 80–99)
MONOCYTES # BLD AUTO: 1.2 X 10^3 (ref 0–1)
MONOCYTES NFR BLD AUTO: 12 % (ref 0–12)
NEUTROPHILS # BLD AUTO: 7.6 X 10^3 (ref 1.8–7.8)
NEUTROPHILS NFR BLD AUTO: 73 % (ref 42–75)
NITRITE UR QL STRIP: POSITIVE
PH UR STRIP: 5 [PH] (ref 5–9)
PLATELET # BLD: 345 10^3/UL (ref 130–400)
PMV BLD AUTO: 10.2 FL (ref 7.4–10.4)
POTASSIUM SERPL-SCNC: 4.5 MMOL/L (ref 3.6–5)
PROT SERPL-MCNC: 5.1 GM/DL (ref 6.4–8.2)
PROT UR QL STRIP: (no result)
RBC # BLD AUTO: 3.44 10^6/UL (ref 4.35–5.85)
RBC #/AREA URNS HPF: (no result) /HPF
SODIUM SERPL-SCNC: 134 MMOL/L (ref 135–145)
SP GR UR STRIP: 1.01 (ref 1.02–1.02)
UROBILINOGEN UR-MCNC: 4 MG/DL
WBC # BLD AUTO: 10.3 10^3/UL (ref 4.3–11)
YEAST #/AREA URNS HPF: (no result) /HPF

## 2018-12-05 RX ADMIN — LACTOBACILLUS ACIDOPHILUS / LACTOBACILLUS BULGARICUS SCH GM: 100 MILLION CFU STRENGTH GRANULES at 16:48

## 2018-12-05 RX ADMIN — SODIUM CHLORIDE SCH MG: 900 INJECTION, SOLUTION INTRAVENOUS at 09:29

## 2018-12-05 RX ADMIN — ENOXAPARIN SODIUM SCH MG: 100 INJECTION SUBCUTANEOUS at 11:13

## 2018-12-05 RX ADMIN — Medication SCH EA: at 06:52

## 2018-12-05 RX ADMIN — LACTOBACILLUS ACIDOPHILUS / LACTOBACILLUS BULGARICUS SCH GM: 100 MILLION CFU STRENGTH GRANULES at 21:11

## 2018-12-05 RX ADMIN — PHENAZOPYRIDINE HYDROCHLORIDE SCH MG: 100 TABLET ORAL at 09:29

## 2018-12-05 RX ADMIN — OXYBUTYNIN CHLORIDE SCH MG: 5 TABLET ORAL at 13:43

## 2018-12-05 RX ADMIN — ALBUTEROL SULFATE SCH MG: 2.5 SOLUTION RESPIRATORY (INHALATION) at 15:27

## 2018-12-05 RX ADMIN — METOPROLOL TARTRATE SCH MG: 25 TABLET, FILM COATED ORAL at 21:11

## 2018-12-05 RX ADMIN — ALLOPURINOL SCH MG: 300 TABLET ORAL at 09:29

## 2018-12-05 RX ADMIN — SENNOSIDES SCH MG: 8.6 TABLET, FILM COATED ORAL at 21:10

## 2018-12-05 RX ADMIN — OXYBUTYNIN CHLORIDE SCH MG: 5 TABLET ORAL at 21:11

## 2018-12-05 RX ADMIN — SENNOSIDES SCH MG: 8.6 TABLET, FILM COATED ORAL at 09:29

## 2018-12-05 RX ADMIN — LACTOBACILLUS ACIDOPHILUS / LACTOBACILLUS BULGARICUS SCH GM: 100 MILLION CFU STRENGTH GRANULES at 06:52

## 2018-12-05 RX ADMIN — INSULIN ASPART SCH UNIT: 100 INJECTION, SOLUTION INTRAVENOUS; SUBCUTANEOUS at 16:22

## 2018-12-05 RX ADMIN — INSULIN ASPART SCH UNIT: 100 INJECTION, SOLUTION INTRAVENOUS; SUBCUTANEOUS at 06:14

## 2018-12-05 RX ADMIN — DOCUSATE SODIUM SCH MG: 100 CAPSULE ORAL at 21:10

## 2018-12-05 RX ADMIN — METFORMIN HYDROCHLORIDE SCH MG: 500 TABLET, FILM COATED ORAL at 06:52

## 2018-12-05 RX ADMIN — METFORMIN HYDROCHLORIDE SCH MG: 500 TABLET, FILM COATED ORAL at 16:48

## 2018-12-05 RX ADMIN — ALBUTEROL SULFATE SCH MG: 2.5 SOLUTION RESPIRATORY (INHALATION) at 08:42

## 2018-12-05 RX ADMIN — PHENAZOPYRIDINE HYDROCHLORIDE SCH MG: 100 TABLET ORAL at 13:43

## 2018-12-05 RX ADMIN — INSULIN ASPART SCH UNIT: 100 INJECTION, SOLUTION INTRAVENOUS; SUBCUTANEOUS at 23:30

## 2018-12-05 RX ADMIN — PHENAZOPYRIDINE HYDROCHLORIDE SCH MG: 100 TABLET ORAL at 18:04

## 2018-12-05 RX ADMIN — LISINOPRIL SCH MG: 20 TABLET ORAL at 09:29

## 2018-12-05 RX ADMIN — BETHANECHOL CHLORIDE SCH MG: 25 TABLET ORAL at 16:48

## 2018-12-05 RX ADMIN — FAMOTIDINE SCH MG: 20 TABLET, FILM COATED ORAL at 09:28

## 2018-12-05 RX ADMIN — RISPERIDONE SCH MG: 0.25 TABLET, FILM COATED ORAL at 21:11

## 2018-12-05 RX ADMIN — AMLODIPINE BESYLATE SCH MG: 5 TABLET ORAL at 09:29

## 2018-12-05 RX ADMIN — BETHANECHOL CHLORIDE SCH MG: 25 TABLET ORAL at 21:11

## 2018-12-05 RX ADMIN — BETHANECHOL CHLORIDE SCH MG: 25 TABLET ORAL at 06:52

## 2018-12-05 RX ADMIN — TAMSULOSIN HYDROCHLORIDE SCH MG: 0.4 CAPSULE ORAL at 18:04

## 2018-12-05 RX ADMIN — OXYBUTYNIN CHLORIDE SCH MG: 5 TABLET ORAL at 09:29

## 2018-12-05 RX ADMIN — DOCUSATE SODIUM SCH MG: 100 CAPSULE ORAL at 09:29

## 2018-12-05 RX ADMIN — PIOGLITAZONE SCH MG: 30 TABLET ORAL at 06:52

## 2018-12-05 RX ADMIN — METOPROLOL TARTRATE SCH MG: 25 TABLET, FILM COATED ORAL at 09:29

## 2018-12-05 RX ADMIN — SIMVASTATIN SCH MG: 10 TABLET, FILM COATED ORAL at 21:11

## 2018-12-05 RX ADMIN — RISPERIDONE SCH MG: 0.25 TABLET, FILM COATED ORAL at 09:40

## 2018-12-05 RX ADMIN — INSULIN ASPART SCH UNIT: 100 INJECTION, SOLUTION INTRAVENOUS; SUBCUTANEOUS at 10:58

## 2018-12-05 RX ADMIN — AMIODARONE HYDROCHLORIDE SCH MG: 200 TABLET ORAL at 09:28

## 2018-12-05 RX ADMIN — LACTOBACILLUS ACIDOPHILUS / LACTOBACILLUS BULGARICUS SCH GM: 100 MILLION CFU STRENGTH GRANULES at 11:14

## 2018-12-05 RX ADMIN — BETHANECHOL CHLORIDE SCH MG: 25 TABLET ORAL at 11:14

## 2018-12-05 NOTE — PROGRESS NOTE-HOSPITALIST
LISA QUIÑONES  18 [id number]:


Subjective


HPI/CC On Admission


Date Seen by Provider:  Dec 5, 2018


Time Seen by Provider:  09:30


CC: s/p spinal hardware failure replacement





HPI: This is an 78-year-old white male who was transferred from inpatient 

rehabilitation department to ICU for close monitoring following spinal hardware 

failure replacement yesterday.  He had an uncomplicated surgery and is 

currently recovering and doing well overall.  Urinary output has decreased and 

blood pressure is 115 so will initiate low flow IV fluid of normal saline and 

encouraged him to take in nutrition and fluids today.  He will be fitted for a 

shell back brace and then we will get him out of bed.  Overall patient is doing 

much better wife was updated and patient feels better overall.


Subjective/Events-last exam


Delirium is slowly improving but still present


Patient has had 4 surgeries in past 8 weeks and 3 in the past 2 weeks


Advanced age makes this case more complex by the day


+BM





Review of Systems


General:  Fatigue


Musculoskeletal:  back pain


Neurological:  Confusion





Objective


Exam


Vital Signs





Vital Signs








  Date Time  Temp Pulse Resp B/P (MAP) Pulse Ox O2 Delivery O2 Flow Rate FiO2


 


18 15:05 97.5 69 20 104/55 (71) 95 Room Air  


 


18 12:00       1.00 





Capillary Refill :


General Appearance:  No Apparent Distress, WD/WN, Chronically ill


Respiratory:  Chest Non Tender, Lungs Clear, Normal Breath Sounds, No Accessory 

Muscle Use, No Respiratory Distress


Cardiovascular:  Regular Rate, Rhythm, No Edema, No Gallop, No JVD, No Murmur, 

Normal Peripheral Pulses


Neurologic/Psychiatric:  Alert, Disoriented


Skin:  Normal Color, Warm/Dry





Results/Procedures


Lab


Laboratory Tests


18 05:36








18 06:30








Patient resulted labs reviewed.





Assessment/Plan


Assessment and Plan


Assess & Plan/Chief Complaint


Assessment:


Acute and severe delirium s/p stopped Fentanyl patch and started Risperdal with 

some improvement


Recurrent severe back pain with referred lower abdominal pain similar to 

initial presentation obtained labs and CT scan and Dr Bustos consultation which 

revealed displaced hardware in need of repeat surgery s/p on 18 POD # 6


Debility following spinal abscess status post 2 I&D's uncomplicated per Dr Bustos


Urinary retention acute maintained with panda but Dr Tawil consulted and he is 

appreciated


C. difficile colitis resolved


Severe anemia due to acute blood loss status post 4 units of blood while on 

MedSurg and ICU then 1 unit  then requiring another 2 units 4 days ago


CAD


DM


CRI


Delirium





Plan:


Pain control but will stop Fentanyl patch since I suspect that to be the main 

factor in delirium


Increase PO nutrition and fluids


Use IS faithfully


Monitor cough but cont nebs


BM regimen


Nebs


IRF if delirium resolves


Check UA





Diagnosis/Problems


Diagnosis/Problems





(1) Delirium


Status:  Acute


(2) Loosening of hardware in spine


Status:  Resolved


Resolution Date/Time:  18 @ 12:52


(3) Intraspinal abscess and granuloma


Status:  Resolved


Resolution Date/Time:  18 @ 14:02


(4) Urinary retention


Status:  Acute


(5) Transfusion of blood during current hospitalization


Status:  Acute


(6) Advanced age


Status:  Chronic


(7) C. difficile colitis


Status:  Acute


(8) Renal insufficiency


Status:  Resolved


Resolution Date/Time:  18 @ 09:41


(9) Leukocytosis


Status:  Resolved


Resolution Date/Time:  18 @ 14:02


(10) CAD (coronary artery disease)


Status:  Chronic


(11) Atrial fibrillation


Status:  Chronic


Qualifiers:  


   Atrial fibrillation type:  unspecified  Qualified Codes:  I48.91 - 

Unspecified atrial fibrillation





Clinical Quality Measures


DVT/VTE Risk/Contraindication:


Risk Factor Score Per Nursin


RFS Level Per Nursing on Admit:  4+=Very High





MACHO QUINTERO MED STUDENT 18 [id number]:


Subjective


Subjective/Events-last exam


Patient is still very confused today


He states that he is not in pain


Nurses are asking if a panda catheter could be placed





Objective


Exam


General Appearance:  No Apparent Distress, WD/WN


Respiratory:  Chest Non Tender, Lungs Clear, Normal Breath Sounds, No Accessory 

Muscle Use, No Respiratory Distress


Cardiovascular:  Regular Rate, Rhythm, No Edema, No Gallop, No JVD, No Murmur


Gastrointestinal:  Normal Bowel Sounds, Non Tender, Soft


Neurologic/Psychiatric:  Alert, Disoriented


Skin:  Normal Color, Warm/Dry





Assessment/Plan


Assessment and Plan


Assess & Plan/Chief Complaint





Assessment:


1) Acute delirium


2) Debility due to repeated spinal surgery


3) Diabetes





Plan:


1) Discontinue fentanyl


2) Continue antipsychotic medications


3) Physical therapy











LISA QUIÑONES DO Dec 5, 2018 09:58


MACHO QUINTERO MED STUDENT Dec 5, 2018 10:37

## 2018-12-05 NOTE — PHYSICAL THERAPY DAILY NOTE
PT Daily Note-Current


Subjective


Pt on commode when PT entered room. Pt requested to be transferred back to chair

/bed.





Pain





   Numeric Pain Scale:  0-No Pain


   Location:  No Pain Reported





Mental Status


Patient Orientation:  Confused, Mumbles


Attachments:  Drains, IV


wound vac





Transfers


Therapy Code Descriptions/Definitions 





Functional [place] Measure:


0=Not Assessed/NA        4=Minimal Assistance


1=Total Assistance        5=Supervision or Setup


2=Maximal Assistance  6=Modified [place]


3=Moderate Assistance 7=Complete [place]








Therapy Quality Codes:


6    Independent with activity with or without an assistive device


5    Patient requires set up or clean up by helper.  Patient completes activity

  by  themselves


4    Supervision or touching assist (CGA). [place] provide cues , steadying 

assist


3    The helper provides less than half the effort to complete the activity


2    The helper provides more than half the effort to complete the activity


1    Dependent.  The helper does all the effort to complete an activity 


7    Patient refused to complete or attempt activity


9    The patient did not perform the activity before the current illness or 

injury


88  Not attempted due to Medical conditions or safety concerns


Transfers (B, C, W/C) (FIM):  3


Scooting:  3


Rolling:  3


Supine to/from Sit:  3


Sit to/from Stand:  3


Bed to/from Chair:  3





Weight Bearing


Right Lower Extremity:  Right


Weight Bearing/Tolerated


Left Lower Extremity:  Left


Weight Bearing/Tolerated





Gait Training


Gait (FIM):  1


Distance (FIM):  1=up to 49 ft


Distance:  3'x2


Gait Level of Assist:  2


Gait Persons Needed:  1


Gait Assistive Device:  Handheld Assist





Exercises


Seated Therapy Exercises:  Hip flexion, Kicking activity


Seated Reps:  20


Standing:  Sit to Stand


Standing Reps:  5





Assessment


Current Status:  Fair Progress


Pt is Mod A when transferring from commode to chair but decide to return to bed 

by end of visit. Pt performed kicking activity and hip flexion exercises while 

sitting in bedside recliner. Pt is Mod A in transferring back to bed and needs 

cueing through all activities. Pt will continue therapy to improve LE strength 

and endurance.





PT Short Term Goals


Short Term Goals


Time Frame:  Dec 8, 2018


Transfers (B,C,W/C) (FIM):  3


Gait (FIM):  1


Distance (FIM):  1=up to 49 ft


Gait Distance Comment:  20'


Gait Level of Assist:  3


Gait Assistive Device:  FWW





PT Long Term Goals


Long Term Goals


PT Long Term Goals Time Frame:  Dec 22, 2018


Transfers (B,C,W/C) (FIM):  6


Gait (FIM):  6


Gait distance (FIM):  3=150 ft


Distance:  150'


Gait Level of Assist:  6


Gait Assistive Device:  FWW





PT Plan


Problem List


Problem List:  Activity Tolerance, Functional Strength, Safety, Balance, Gait, 

Transfer, Bed Mobility, ROM





Treatment/Plan


Treatment Plan:  Continue Plan of Care


Treatment Plan:  Bed Mobility, Education, Functional Activity Samina, Functional 

Strength, Gait, Safety, Therapeutic Exercise, Transfers


Treatment Duration:  Dec 22, 2018


Frequency:  11 times per week


Estimated Hrs Per Day:  .5 hour per day


Patient and/or Family Agrees t:  Yes





Safety Risks/Education


Patient Education:  Gait Training, Transfer Techniques, Correct Positioning, 

Safety Issues


Teaching Recipient:  Patient


Teaching Methods:  Demonstration, Discussion


Response to Teaching:  Reinforcement Needed





Time/GCodes


Time In:  1303


Time Out:  1322


Total Billed Treatment Time:  17


Total Billed Treatment


1 Visit


FA - 17'











MARY SHEPARD PT Dec 5, 2018 13:29

## 2018-12-05 NOTE — PROGRESS NOTE-UROLOGY
Progress Note-Urology


Progress Notes/Assess & Plan


Progress/Assessment & Plan


STILL DID NOT VOID ON OWN WE WILL INCREASE URECHOLINE TO 50 IF OK WITH DR QUIÑONES


Final Diagnosis


URINE RETENTION











TAWIL,ELIAS A MD Dec 5, 2018 08:25

## 2018-12-05 NOTE — PHYSICAL THERAPY DAILY NOTE
PT Daily Note-Current


Subjective


Pt awake in bed when PT arrived. Pt agreed to transfer over to bedside recliner 

after requesting to use commode.





Pain





   Numeric Pain Scale:  0-No Pain


   Location:  No Pain Reported





Appearance


Patient in recliner post tx with nurse call, phone, tray, chair alarm on, 

sitter in the room.





Mental Status


Patient Orientation:  Confused, Mumbles


Attachments:  Drains, IV


wound vac





Transfers


Therapy Code Descriptions/Definitions 





Functional [place] Measure:


0=Not Assessed/NA        4=Minimal Assistance


1=Total Assistance        5=Supervision or Setup


2=Maximal Assistance  6=Modified [place]


3=Moderate Assistance 7=Complete [place]








Therapy Quality Codes:


6    Independent with activity with or without an assistive device


5    Patient requires set up or clean up by helper.  Patient completes activity

  by  themselves


4    Supervision or touching assist (CGA). [place] provide cues , steadying 

assist


3    The helper provides less than half the effort to complete the activity


2    The helper provides more than half the effort to complete the activity


1    Dependent.  The helper does all the effort to complete an activity 


7    Patient refused to complete or attempt activity


9    The patient did not perform the activity before the current illness or 

injury


88  Not attempted due to Medical conditions or safety concerns


Transfers (B, C, W/C) (FIM):  3


Scooting:  3


Rolling:  3


Supine to/from Sit:  3


Sit to/from Stand:  3


Bed to/from Chair:  3





Weight Bearing


Right Lower Extremity:  Right


Weight Bearing/Tolerated


Left Lower Extremity:  Left


Weight Bearing/Tolerated





Gait Training


Gait (FIM):  1


Distance:  3'x2


Gait Level of Assist:  3


Gait Persons Needed:  1


Gait Assistive Device:  Handheld Assist





Exercises


Seated Therapy Exercises:  Hip flexion, Kicking activity


Seated Reps:  10


Standing:  Sit to Stand


Standing Reps:  5





Assessment


Current Status:  Fair Progress


Pt is mod A in transfer from supine/EOB/commode. Patient needs cueing of foot 

placement and to weight shift. Patient transferred to bedside chair once he was 

done on the commode. Patient continued with LE exercises for the remainder of 

therapy.  Pt left with belongings and call light before PT exited room.





PT Short Term Goals


Short Term Goals


Time Frame:  Dec 8, 2018


Transfers (B,C,W/C) (FIM):  3


Gait (FIM):  1


Distance (FIM):  1=up to 49 ft


Gait Distance Comment:  20'


Gait Level of Assist:  3


Gait Assistive Device:  FWW





PT Long Term Goals


Long Term Goals


PT Long Term Goals Time Frame:  Dec 22, 2018


Transfers (B,C,W/C) (FIM):  6


Gait (FIM):  6


Gait distance (FIM):  3=150 ft


Distance:  150'


Gait Level of Assist:  6


Gait Assistive Device:  FWW





PT Plan


Problem List


Problem List:  Activity Tolerance, Functional Strength, Safety, Balance, Gait, 

Transfer, Bed Mobility, ROM





Treatment/Plan


Treatment Plan:  Continue Plan of Care


Treatment Plan:  Bed Mobility, Education, Functional Activity Samina, Functional 

Strength, Gait, Safety, Therapeutic Exercise, Transfers


Treatment Duration:  Dec 22, 2018


Frequency:  11 times per week


Estimated Hrs Per Day:  .5 hour per day


Patient and/or Family Agrees t:  Yes





Safety Risks/Education


Patient Education:  Gait Training, Transfer Techniques, Correct Positioning, 

Safety Issues


Teaching Recipient:  Patient


Teaching Methods:  Demonstration, Discussion


Response to Teaching:  Reinforcement Needed





Time/GCodes


Time In:  902


Time Out:  925


Total Billed Treatment Time:  23


Total Billed Treatment


1 Visit


FA - 13'


EX - 10'











MARY SHEPARD PT Dec 5, 2018 09:32

## 2018-12-05 NOTE — PROGRESS NOTE (SOAP)
Subjective


Date Seen by a Provider:  Dec 5, 2018


Time Seen by a Provider:  05:45


Subjective/Events-last exam


Patient is s/p multiple lumbar wound I&Ds with hardware reinstrumentation 

secondary to lumbar abscess


Acute delirium 


VSS


Review of Systems


General:  No Chills


Musculoskeletal:  back pain; No: leg pain


Neurological:  Confusion





Objective


Exam





Vital Signs








  Date Time  Temp Pulse Resp B/P (MAP) Pulse Ox O2 Delivery O2 Flow Rate FiO2


 


18 23:20 98.7 72 18 127/64 (85) 97 Nasal Cannula 1.00 


 


18 21:00      Nasal Cannula 1.00 


 


18 20:00 98.3 74 18 107/55 (72) 98 Room Air  


 


18 16:00 98.1 71 18 109/62 (78) 97 Nasal Cannula 1.00 


 


18 14:28     96 Room Air  


 


18 13:52 98.2 71 18 116/61 (79) 98 Nasal Cannula 1.00 


 


18 09:42     95 Nasal Cannula 1.00 


 


18 09:00     95 Nasal Cannula 1.00 


 


18 08:47 98.6 75 22 144/75 (98) 98 Nasal Cannula 1.00 














I & O 


 


 18





 07:00


 


Intake Total 1800 ml


 


Output Total 1260 ml


 


Balance 540 ml





Capillary Refill :


General Appearance:  No Apparent Distress


Respiratory:  No Respiratory Distress


Gastrointestinal:  soft


Extremity:  Pedal Edema, Other (Moves all 4 extremities)


Neurologic/Psychiatric:  Disoriented; No Facial Droop, No Motor Weakness





Results


Lab


Laboratory Tests


18 11:35: Glucometer 111H


18 16:39: Glucometer 172H


18 19:30: Vancomycin Level Trough 24.3H


18 20:51: Glucometer 188H





Assessment/Plan


Assessment/Plan


Assess & Plan/Chief Complaint


Lumbar wound abscess 


S/P I&D with hardware re-instrumentation


Acute Delirium


DM 2





Will hold all narcotics and muscle relaxers at this time


DC Phenergan


Send UA





Continue Risperdal and prn Haldol





Clinical Quality Measures


DVT/VTE Risk/Contraindication:


Risk Factor Score Per Nursin


RFS Level Per Nursing on Admit:  4+=Very High











ANGELA DONG Dec 5, 2018 05:50

## 2018-12-05 NOTE — OCCUPATIONAL THER DAILY NOTE
OT Current Status-Daily Note


Subjective


Pt sitting in chair with spouse present, agrees to therapy.





Mental Status/Objective


Patient Orientation:  Confused


Therapy Code Descriptions/Definitions 





Functional [place] Measure:


0=Not Assessed/NA        4=Minimal Assistance


1=Total Assistance        5=Supervision or Setup


2=Maximal Assistance  6=Modified [place]


3=Moderate Assistance 7=Complete [place]


Attachments:  Drains, IV





ADL-Treatment


Pt sitting in recliner chair, confused and perseverating on when and how he 

will be leaving. Searching for items on bedside table. Pt able to wash face 

with SBA and multiple verbal cues for sequencing and task completion. Spouse 

states pt has already brushed teeth this morning. Pt picks up cup from bedside 

table and drinks from straw without assist. Attempted to have pt complete UE 

exercises to increase strength for functional tasks, but pt declined to 

participate at this time, states he doesn't want to. Pt does complete bilateral 

hand  exercises x20 reps with therapy foam to increase  strength. Pt 

has difficulty following commands and participating in therapeutic activities 

at this time. Will continue per plan of care and increase activity as pt able 

to tolerate.





OT Short Term Goals


Short Term Goals


Time Frame:  Dec 7, 2018


Eating(FIM):  5


Upper Body Dressing(FIM):  4


Lower Body Dressing(FIM):  3


Transfers (B,C,W/C) (FIM):  3


Additional Short Term Goals:  1-Demonstrate ADL Tasks, 2-Verbalize Understanding

, 3-ImproveStrength/Samina


1=Demonstrate adherence to instructed precautions during ADL tasks.


2=Patient will verbalize/demonstrate understanding of assistive devices/

modifications for ADL.


3=Patient will improve strength/tolerance for activity to enable patient to 

perform ADL's.





OT Long Term Goals


Long Term Goals


Time Frame:  Dec 14, 2018


Grooming(FIM):  5


Upper Body Dressing(FIM):  5


Lower Body Dressing(FIM):  4


Toileting(FIM):  4


Toilet/Commode Transfer(FIM):  4


Additional Goals:  1-Demonstrate ADL Tasks, 2-Verbalize Understanding, 3-

ImproveStrength/Samina


1=Demonstrate adherence to instructed precautions during ADL tasks.


2=Patient will verbalize/demonstrate understanding of assistive devices/

modifications for ADL.


3=Patient will improve strength/tolerance for activity to enable patient to 

perform ADL's.





OT Education/Plan


Problem List/Assessment


Pt to benefit from skilled OT intervention for ADL training, transfers, 

strengthening, adaptive equipment training, and safety education to increase 

functional independence.





Discharge Recommendations


Plan/Recommendations:  Continue POC





Treatment Plan/Plan of Care


Patient would benefit from OT for education, treatment and training to promote 

independence in ADL's, mobility, safety and/or upper extremity function for ADL'

s.


Plan of Care:  ADL Retraining, Functional Mobility, UE Funct Exercise/Act


Treatment Duration:  Dec 14, 2018


Frequency:  5 times per week


Estimated Hrs Per Day:  .25 hour per day


Agreement:  Yes


Rehab Potential:  Fair





Time/GCodes


Start Time:  10:38


Stop Time:  10:52


Total Time Billed (hr/min):  14


Billed Treatment Time


1 visit, ADL(14minutes)











MANJINDER GIRON OT Dec 5, 2018 13:47

## 2018-12-06 VITALS — DIASTOLIC BLOOD PRESSURE: 72 MMHG | SYSTOLIC BLOOD PRESSURE: 157 MMHG

## 2018-12-06 VITALS — SYSTOLIC BLOOD PRESSURE: 124 MMHG | DIASTOLIC BLOOD PRESSURE: 60 MMHG

## 2018-12-06 VITALS — SYSTOLIC BLOOD PRESSURE: 141 MMHG | DIASTOLIC BLOOD PRESSURE: 74 MMHG

## 2018-12-06 VITALS — DIASTOLIC BLOOD PRESSURE: 51 MMHG | SYSTOLIC BLOOD PRESSURE: 106 MMHG

## 2018-12-06 VITALS — SYSTOLIC BLOOD PRESSURE: 114 MMHG | DIASTOLIC BLOOD PRESSURE: 59 MMHG

## 2018-12-06 LAB
ALBUMIN SERPL-MCNC: 2.3 GM/DL (ref 3.2–4.5)
ALP SERPL-CCNC: 173 U/L (ref 40–136)
ALT SERPL-CCNC: 39 U/L (ref 0–55)
BASOPHILS # BLD AUTO: 0 10^3/UL (ref 0–0.1)
BASOPHILS NFR BLD AUTO: 0 % (ref 0–10)
BILIRUB SERPL-MCNC: 0.5 MG/DL (ref 0.1–1)
BUN/CREAT SERPL: 9
CALCIUM SERPL-MCNC: 8.5 MG/DL (ref 8.5–10.1)
CHLORIDE SERPL-SCNC: 104 MMOL/L (ref 98–107)
CO2 SERPL-SCNC: 21 MMOL/L (ref 21–32)
CREAT SERPL-MCNC: 1.07 MG/DL (ref 0.6–1.3)
EOSINOPHIL # BLD AUTO: 0.3 10^3/UL (ref 0–0.3)
EOSINOPHIL NFR BLD AUTO: 3 % (ref 0–10)
ERYTHROCYTE [DISTWIDTH] IN BLOOD BY AUTOMATED COUNT: 16.5 % (ref 10–14.5)
GFR SERPLBLD BASED ON 1.73 SQ M-ARVRAT: > 60 ML/MIN
GLUCOSE SERPL-MCNC: 100 MG/DL (ref 70–105)
HCT VFR BLD CALC: 29 % (ref 40–54)
HGB BLD-MCNC: 9.4 G/DL (ref 13.3–17.7)
LYMPHOCYTES # BLD AUTO: 1.3 X 10^3 (ref 1–4)
LYMPHOCYTES NFR BLD AUTO: 12 % (ref 12–44)
MANUAL DIFFERENTIAL PERFORMED BLD QL: NO
MCH RBC QN AUTO: 29 PG (ref 25–34)
MCHC RBC AUTO-ENTMCNC: 33 G/DL (ref 32–36)
MCV RBC AUTO: 90 FL (ref 80–99)
MONOCYTES # BLD AUTO: 1.2 X 10^3 (ref 0–1)
MONOCYTES NFR BLD AUTO: 11 % (ref 0–12)
NEUTROPHILS # BLD AUTO: 7.8 X 10^3 (ref 1.8–7.8)
NEUTROPHILS NFR BLD AUTO: 74 % (ref 42–75)
PLATELET # BLD: 376 10^3/UL (ref 130–400)
PMV BLD AUTO: 9.5 FL (ref 7.4–10.4)
POTASSIUM SERPL-SCNC: 4.4 MMOL/L (ref 3.6–5)
PROT SERPL-MCNC: 4.5 GM/DL (ref 6.4–8.2)
RBC # BLD AUTO: 3.19 10^6/UL (ref 4.35–5.85)
SODIUM SERPL-SCNC: 134 MMOL/L (ref 135–145)
VANCOMYCIN TROUGH SERPL-MCNC: 15.4 UG/ML (ref 10–20)
WBC # BLD AUTO: 10.6 10^3/UL (ref 4.3–11)

## 2018-12-06 RX ADMIN — ENOXAPARIN SODIUM SCH MG: 100 INJECTION SUBCUTANEOUS at 09:17

## 2018-12-06 RX ADMIN — DOCUSATE SODIUM SCH MG: 100 CAPSULE ORAL at 09:17

## 2018-12-06 RX ADMIN — PIOGLITAZONE SCH MG: 30 TABLET ORAL at 06:37

## 2018-12-06 RX ADMIN — OXYBUTYNIN CHLORIDE SCH MG: 5 TABLET ORAL at 14:06

## 2018-12-06 RX ADMIN — RISPERIDONE SCH MG: 0.25 TABLET, FILM COATED ORAL at 22:28

## 2018-12-06 RX ADMIN — PHENAZOPYRIDINE HYDROCHLORIDE SCH MG: 100 TABLET ORAL at 18:01

## 2018-12-06 RX ADMIN — METFORMIN HYDROCHLORIDE SCH MG: 500 TABLET, FILM COATED ORAL at 06:36

## 2018-12-06 RX ADMIN — SODIUM CHLORIDE SCH MLS/HR: 900 INJECTION, SOLUTION INTRAVENOUS at 09:41

## 2018-12-06 RX ADMIN — METFORMIN HYDROCHLORIDE SCH MG: 500 TABLET, FILM COATED ORAL at 18:08

## 2018-12-06 RX ADMIN — Medication SCH EA: at 06:37

## 2018-12-06 RX ADMIN — LISINOPRIL SCH MG: 20 TABLET ORAL at 09:17

## 2018-12-06 RX ADMIN — OXYBUTYNIN CHLORIDE SCH MG: 5 TABLET ORAL at 09:18

## 2018-12-06 RX ADMIN — AMIODARONE HYDROCHLORIDE SCH MG: 200 TABLET ORAL at 09:17

## 2018-12-06 RX ADMIN — ALBUTEROL SULFATE SCH MG: 2.5 SOLUTION RESPIRATORY (INHALATION) at 08:47

## 2018-12-06 RX ADMIN — SENNOSIDES SCH MG: 8.6 TABLET, FILM COATED ORAL at 09:16

## 2018-12-06 RX ADMIN — SIMVASTATIN SCH MG: 10 TABLET, FILM COATED ORAL at 22:28

## 2018-12-06 RX ADMIN — ALLOPURINOL SCH MG: 300 TABLET ORAL at 09:16

## 2018-12-06 RX ADMIN — METOPROLOL TARTRATE SCH MG: 25 TABLET, FILM COATED ORAL at 09:16

## 2018-12-06 RX ADMIN — BETHANECHOL CHLORIDE SCH MG: 25 TABLET ORAL at 18:01

## 2018-12-06 RX ADMIN — INSULIN ASPART SCH UNIT: 100 INJECTION, SOLUTION INTRAVENOUS; SUBCUTANEOUS at 16:07

## 2018-12-06 RX ADMIN — BETHANECHOL CHLORIDE SCH MG: 25 TABLET ORAL at 10:53

## 2018-12-06 RX ADMIN — OXYBUTYNIN CHLORIDE SCH MG: 5 TABLET ORAL at 22:28

## 2018-12-06 RX ADMIN — METOPROLOL TARTRATE SCH MG: 25 TABLET, FILM COATED ORAL at 22:28

## 2018-12-06 RX ADMIN — PHENAZOPYRIDINE HYDROCHLORIDE SCH MG: 100 TABLET ORAL at 14:08

## 2018-12-06 RX ADMIN — LACTOBACILLUS ACIDOPHILUS / LACTOBACILLUS BULGARICUS SCH GM: 100 MILLION CFU STRENGTH GRANULES at 06:37

## 2018-12-06 RX ADMIN — SENNOSIDES SCH MG: 8.6 TABLET, FILM COATED ORAL at 22:28

## 2018-12-06 RX ADMIN — PHENAZOPYRIDINE HYDROCHLORIDE SCH MG: 100 TABLET ORAL at 07:36

## 2018-12-06 RX ADMIN — FAMOTIDINE SCH MG: 20 TABLET, FILM COATED ORAL at 09:17

## 2018-12-06 RX ADMIN — BETHANECHOL CHLORIDE SCH MG: 25 TABLET ORAL at 06:37

## 2018-12-06 RX ADMIN — LACTOBACILLUS ACIDOPHILUS / LACTOBACILLUS BULGARICUS SCH GM: 100 MILLION CFU STRENGTH GRANULES at 18:01

## 2018-12-06 RX ADMIN — LACTOBACILLUS ACIDOPHILUS / LACTOBACILLUS BULGARICUS SCH GM: 100 MILLION CFU STRENGTH GRANULES at 22:28

## 2018-12-06 RX ADMIN — INSULIN ASPART SCH UNIT: 100 INJECTION, SOLUTION INTRAVENOUS; SUBCUTANEOUS at 20:53

## 2018-12-06 RX ADMIN — HALOPERIDOL LACTATE PRN MG: 5 INJECTION, SOLUTION INTRAMUSCULAR at 07:39

## 2018-12-06 RX ADMIN — BETHANECHOL CHLORIDE SCH MG: 25 TABLET ORAL at 22:27

## 2018-12-06 RX ADMIN — ALBUTEROL SULFATE SCH MG: 2.5 SOLUTION RESPIRATORY (INHALATION) at 04:11

## 2018-12-06 RX ADMIN — TAMSULOSIN HYDROCHLORIDE SCH MG: 0.4 CAPSULE ORAL at 18:02

## 2018-12-06 RX ADMIN — AMLODIPINE BESYLATE SCH MG: 5 TABLET ORAL at 09:17

## 2018-12-06 RX ADMIN — RISPERIDONE SCH MG: 0.25 TABLET, FILM COATED ORAL at 09:17

## 2018-12-06 RX ADMIN — INSULIN ASPART SCH UNIT: 100 INJECTION, SOLUTION INTRAVENOUS; SUBCUTANEOUS at 11:00

## 2018-12-06 RX ADMIN — INSULIN ASPART SCH UNIT: 100 INJECTION, SOLUTION INTRAVENOUS; SUBCUTANEOUS at 06:04

## 2018-12-06 RX ADMIN — DOCUSATE SODIUM SCH MG: 100 CAPSULE ORAL at 22:27

## 2018-12-06 RX ADMIN — ALBUTEROL SULFATE SCH MG: 2.5 SOLUTION RESPIRATORY (INHALATION) at 21:00

## 2018-12-06 RX ADMIN — ALBUTEROL SULFATE SCH MG: 2.5 SOLUTION RESPIRATORY (INHALATION) at 14:32

## 2018-12-06 RX ADMIN — LACTOBACILLUS ACIDOPHILUS / LACTOBACILLUS BULGARICUS SCH GM: 100 MILLION CFU STRENGTH GRANULES at 10:53

## 2018-12-06 NOTE — PROGRESS NOTE-UROLOGY
Progress Note-Urology


Progress Notes/Assess & Plan


Progress/Assessment & Plan


STARTED VOIDING ON OWN. TOLERATED INCREASE URECHOLINE WELL. KEEP SAME PLAN


Final Diagnosis


URINE RETENTION











TAWIL,ELIAS A MD Dec 6, 2018 11:46

## 2018-12-06 NOTE — PHYSICAL THERAPY DAILY NOTE
PT Daily Note-Current


Subjective


Pt asleep in bed when PT arrived. PT woke to greeting and needed encouragement 

to transfer over to bedside recliner





Pain





   Numeric Pain Scale:  5-Moderate Pain


   Location:  Posterior, Incisional


   Location Body Site:  Back


   Pain Description:  Acute





Mental Status


Patient Orientation:  Confused, Mumbles


Attachments:  Drains





Transfers


Therapy Code Descriptions/Definitions 





Functional [place] Measure:


0=Not Assessed/NA        4=Minimal Assistance


1=Total Assistance        5=Supervision or Setup


2=Maximal Assistance  6=Modified [place]


3=Moderate Assistance 7=Complete [place]








Therapy Quality Codes:


6    Independent with activity with or without an assistive device


5    Patient requires set up or clean up by helper.  Patient completes activity

  by  themselves


4    Supervision or touching assist (CGA). [place] provide cues , steadying 

assist


3    The helper provides less than half the effort to complete the activity


2    The helper provides more than half the effort to complete the activity


1    Dependent.  The helper does all the effort to complete an activity 


7    Patient refused to complete or attempt activity


9    The patient did not perform the activity before the current illness or 

injury


88  Not attempted due to Medical conditions or safety concerns


Transfers (B, C, W/C) (FIM):  1


Scootin


Rollin


Supine to/from Sit:  1


Sit to/from Stand:  2


Bed to/from Chair:  2


assist x 2 with all mobility due to weakness and confusion/PT donned clamshell 

back brace in sit EOB





Weight Bearing


Right Lower Extremity:  Right


Weight Bearing/Tolerated


Left Lower Extremity:  Left


Weight Bearing/Tolerated





Gait Training


Gait (FIM):  1


Distance (FIM):  1=up to 49 ft


Distance:  5'


Gait Level of Assist:  2


Gait Persons Needed:  2


Gait Assistive Device:  FWW





Exercises


Seated Therapy Exercises:  Kicking activity


Seated Reps:  10





Assessment


Pt is dependent in bed mobility and needed two therapist to sit at EOB. 

Patients current clam shell brace does not fit properly and presses into his 

throat when sitting. Patient ambulated for 5' with a FWW requiring Max A with 

the assistance of two therapist. Patient performed LE exercises at bedside 

recliner before PT exited room. Pt left with belongings, call light, and med 

student when PT exited room.





PT Short Term Goals


Short Term Goals


Time Frame:  Dec 8, 2018


Transfers (B,C,W/C) (FIM):  3


Gait (FIM):  1


Distance (FIM):  1=up to 49 ft


Gait Distance Comment:  20'


Gait Level of Assist:  3


Gait Assistive Device:  FWW





PT Long Term Goals


Long Term Goals


PT Long Term Goals Time Frame:  Dec 22, 2018


Transfers (B,C,W/C) (FIM):  6


Gait (FIM):  6


Gait distance (FIM):  3=150 ft


Distance:  150'


Gait Level of Assist:  6


Gait Assistive Device:  FWW





PT Plan


Problem List


Problem List:  Activity Tolerance, Functional Strength, Safety, Balance, Gait, 

Transfer, Bed Mobility, ROM





Treatment/Plan


Treatment Plan:  Continue Plan of Care


Treatment Plan:  Bed Mobility, Education, Functional Activity Sam[place], Functional 

Strength, Gait, Safety, Therapeutic Exercise, Transfers


Treatment Duration:  Dec 22, 2018


Frequency:  11 times per week


Estimated Hrs Per Day:  .5 hour per day


Patient and/or Family Agrees t:  Yes





Time/GCodes


Time In:  850


Time Out:  904


Total Billed Treatment Time:  14


Total Billed Treatment


1 Visit


FA - 14'











DANA CROOKS PT Dec 6, 2018 09:36

## 2018-12-06 NOTE — PROGRESS NOTE (SOAP)
Subjective


Date Seen by a Provider:  Dec 6, 2018


Time Seen by a Provider:  06:10


Subjective/Events-last exam


Very confused this morning, history from his wife who has been at bedside all 

night.  States he is comfortable, but mumbling and confused.





Objective


Exam





Vital Signs








  Date Time  Temp Pulse Resp B/P (MAP) Pulse Ox O2 Delivery O2 Flow Rate FiO2


 


18 03:56 99.1 78 18 114/59 (77) 95 Nasal Cannula 1.00 


 


18 23:44 99.6 75 18 135/62 (86) 92 Nasal Cannula 1.00 


 


18 21:00      Room Air  


 


18 19:30 98.3 75 20 111/57 (75) 95 Nasal Cannula 1.00 


 


18 15:05 97.5 69 20 104/55 (71) 95 Room Air  


 


18 12:00 98.1 70 20 108/55 (72) 96 Nasal Cannula 1.00 


 


18 08:42     95 Room Air  


 


18 08:36     98 Nasal Cannula 1.00 


 


18 07:40 98.8 80 18 153/67 (95) 98 Nasal Cannula 1.00 














I & O 


 


 18





 07:00


 


Intake Total 820 ml


 


Output Total 1025 ml


 


Balance -205 ml





Capillary Refill :


General Appearance:  No Apparent Distress


Respiratory:  No Accessory Muscle Use, No Respiratory Distress


Cardiovascular:  Normal Peripheral Pulses


Gastrointestinal:  soft


Extremity:  Normal Capillary Refill


Neurologic/Psychiatric:  Disoriented





Results


Lab


Laboratory Tests


18 06:30: 


Sodium Level 134L, Potassium Level 4.5, Chloride Level 103, Carbon Dioxide 

Level 22, Anion Gap 9, Blood Urea Nitrogen 12, Creatinine 1.09, Estimat 

Glomerular Filtration Rate > 60, BUN/Creatinine Ratio 11, Glucose Level 115H, 

Calcium Level 8.8, Corrected Calcium 9.9, Total Bilirubin 0.6, Aspartate Amino 

Transf (AST/SGOT) 54H, Alanine Aminotransferase (ALT/SGPT) 42, Alkaline 

Phosphatase 205H, Total Protein 5.1L, Albumin 2.6L, Vancomycin Level Trough 

20.9H


18 10:54: Glucometer 130H


18 16:07: Glucometer 182H


18 19:45: 


Urine Color ORANGE, Urine Clarity CLEAR, Urine pH 5, Urine Specific Gravity 

1.010L, Urine Protein 1+H, Urine Glucose (UA) NEGATIVE, Urine Ketones NEGATIVE, 

Urine Nitrite POSITIVEH, Urine Bilirubin 2+H, Urine Urobilinogen 4H, Urine 

Leukocyte Esterase 2+H, Urine RBC (Auto) 1+H, Urine RBC 2-5H, Urine WBC 10-25H, 

Urine Crystals PRESENTH, Urine Amorphous Sediment FEW REJI URATESH, Urine 

Bacteria TRACE, Urine Casts NONE, Urine Mucus NEGATIVE, Urine Yeast MODERATEH, 

Urine Culture Indicated YES


18 20:42: Glucometer 134H


18 05:45: 


White Blood Count 10.6, Red Blood Count 3.19L, Hemoglobin 9.4L, Hematocrit 29L, 

Mean Corpuscular Volume 90, Mean Corpuscular Hemoglobin 29, Mean Corpuscular 

Hemoglobin Concent 33, Red Cell Distribution Width 16.5H, Platelet Count 376, 

Mean Platelet Volume 9.5, Neutrophils (%) (Auto) 74, Lymphocytes (%) (Auto) 12, 

Monocytes (%) (Auto) 11, Eosinophils (%) (Auto) 3, Basophils (%) (Auto) 0, 

Neutrophils # (Auto) 7.8, Lymphocytes # (Auto) 1.3, Monocytes # (Auto) 1.2H, 

Eosinophils # (Auto) 0.3, Basophils # (Auto) 0.0, Sodium Level 134L, Potassium 

Level 4.4, Chloride Level 104, Carbon Dioxide Level 21, Anion Gap 9, Blood Urea 

Nitrogen 10, Creatinine 1.07, Estimat Glomerular Filtration Rate > 60, BUN/

Creatinine Ratio 9, Glucose Level 100, Calcium Level 8.5, Corrected Calcium 9.9

, Total Bilirubin 0.5, Aspartate Amino Transf (AST/SGOT) 52H, Alanine 

Aminotransferase (ALT/SGPT) 39, Alkaline Phosphatase 173H, Total Protein 4.5L, 

Albumin 2.3L


18 05:48: Glucometer 130H





Assessment/Plan


Assessment/Plan


Assess & Plan/Chief Complaint


Post-Op Delirium


Lumbar Wound infection with Staph Epi


Lumbar Hardware Failure


Deconditioning


Acute Blood Loss anemia








Will continue to work on resolving delirium





Clinical Quality Measures


DVT/VTE Risk/Contraindication:


Risk Factor Score Per Nursin


RFS Level Per Nursing on Admit:  4+=Very High











ENZO DONALDSON MD Dec 6, 2018 06:30

## 2018-12-06 NOTE — PHYSICAL THERAPY PROGRESS NOTE
Therapy Progress Note


Due to patient's current medical/mental condition and patient inability to 

follow simple direction and actively participate with therapy, PT to decrease 

frequency to 6/wk and will resume 11/wk when stable.











DANA CROOKS PT Dec 6, 2018 11:21

## 2018-12-06 NOTE — PROGRESS NOTE-HOSPITALIST
LISA QUIÑONES  18 1119:


Subjective


HPI/CC On Admission


Date Seen by Provider:  Dec 6, 2018


Time Seen by Provider:  11:00


CC: s/p spinal hardware failure replacement





HPI: This is an 78-year-old white male who was transferred from inpatient 

rehabilitation department to ICU for close monitoring following spinal hardware 

failure replacement yesterday.  He had an uncomplicated surgery and is 

currently recovering and doing well overall.  Urinary output has decreased and 

blood pressure is 115 so will initiate low flow IV fluid of normal saline and 

encouraged him to take in nutrition and fluids today.  He will be fitted for a 

shell back brace and then we will get him out of bed.  Overall patient is doing 

much better wife was updated and patient feels better overall.


Subjective/Events-last exam


Patient is more confused today


Haldol was given


Patient now drowsy


Wife very tearful


If patient cannot clear the delirium he may ultimately fail and decline rapidly


Checked meds again to see if any other source of delirium but this issue is 

expected





Review of Systems


Neurological:  Confusion





Objective


Exam


Vital Signs





Vital Signs








  Date Time  Temp Pulse Resp B/P (MAP) Pulse Ox O2 Delivery O2 Flow Rate FiO2


 


18 07:35     92 Nasal Cannula 1.00 


 


18 04:05 98.4 78 22 153/71 (98)    





Capillary Refill :


General Appearance:  No Apparent Distress, WD/WN, Chronically ill


Respiratory:  Chest Non Tender, Lungs Clear, Normal Breath Sounds, No Accessory 

Muscle Use, No Respiratory Distress


Cardiovascular:  Regular Rate, Rhythm, No Edema, No Gallop, No JVD, No Murmur, 

Normal Peripheral Pulses


Neurologic/Psychiatric:  Alert, Disoriented





Results/Procedures


Lab


Laboratory Tests


18 05:00








Patient resulted labs reviewed.





Assessment/Plan


Assessment and Plan


Assess & Plan/Chief Complaint


Assessment:


Acute and severe delirium s/p stopped Fentanyl patch and started Risperdal with 

some improvement but now worsened and required Haldol due to severe aggression 

and delusions


Recurrent severe back pain with referred lower abdominal pain similar to 

initial presentation obtained labs and CT scan and Dr Bustos consultation which 

revealed displaced hardware in need of repeat surgery s/p on 18 POD # 7


Debility following spinal abscess status post 2 I&D's uncomplicated per Dr Bustos


Urinary retention acute maintained with panda but Dr Tawil consulted and he is 

appreciated


C. difficile colitis resolved


Severe anemia due to acute blood loss status post 4 units of blood while on 

MedSurg and ICU then 1 unit  then requiring another 2 units 5 days ago


CAD


DM


CRI


Delirium


Yeast UTI





Plan:


Pain control but stopped Fentanyl patch since I suspected that to be the main 

factor in delirium but still an issue so increasing Risperdal and give Haldol 

for severe aggression


Increase PO nutrition and fluids


Use IS faithfully


Monitor cough but cont nebs


BM regimen


Nebs


IRF if delirium resolves


Diflucan for yeast in urine





Diagnosis/Problems


Diagnosis/Problems





(1) Delirium


Status:  Acute


(2) Yeast UTI


Status:  Acute


(3) Loosening of hardware in spine


Status:  Resolved


Resolution Date/Time:  18 @ 12:52


(4) Intraspinal abscess and granuloma


Status:  Resolved


Resolution Date/Time:  18 @ 14:02


(5) Urinary retention


Status:  Acute


(6) Transfusion of blood during current hospitalization


Status:  Acute


(7) Advanced age


Status:  Chronic


(8) C. difficile colitis


Status:  Acute


(9) Renal insufficiency


Status:  Resolved


Resolution Date/Time:  18 @ 09:41


(10) Leukocytosis


Status:  Resolved


Resolution Date/Time:  18 @ 14:02


(11) CAD (coronary artery disease)


Status:  Chronic


(12) Atrial fibrillation


Status:  Chronic


Qualifiers:  


   Atrial fibrillation type:  unspecified  Qualified Codes:  I48.91 - 

Unspecified atrial fibrillation





Clinical Quality Measures


DVT/VTE Risk/Contraindication:


Risk Factor Score Per Nursin


RFS Level Per Nursing on Admit:  4+=Very High





MACHO QUINTERO MED STUDENT 18 1146:


Subjective


Subjective/Events-last exam


Patient is very confused this morning


Patient was agitated and combative with nursing staff this morning


Nurse administered Haldol to calm patient


Patient was calm and did not report any pain


Wife stated that patient urinated twice this morning





Objective


Exam


General Appearance:  No Apparent Distress, WD/WN


Respiratory:  Chest Non Tender, Lungs Clear, Normal Breath Sounds, No Accessory 

Muscle Use, No Respiratory Distress


Cardiovascular:  Regular Rate, Rhythm, No Edema, No Gallop, No JVD, No Murmur


Gastrointestinal:  Normal Bowel Sounds


Neurologic/Psychiatric:  Alert, Disoriented


Skin:  Normal Color, Warm/Dry





Assessment/Plan


Assessment and Plan


Assess & Plan/Chief Complaint





Assessment:


1) Delirium


2) Debility due to multiple surgeries


3) Diabetes





Plan: 


1) Continue antipsychotic medications


2) Physical therapy


30 Continue diabetic medications











LISA QUIÑONES DO Dec 6, 2018 11:19


MACHO QUINTERO MED STUDENT Dec 6, 2018 11:46

## 2018-12-06 NOTE — OCCUPATIONAL THER DAILY NOTE
OT Current Status-Daily Note


Subjective


Pt seen in room, up in bed, agreeable to OT. Pain not mentioned.





Appearance


Confused. Unable to state name or , mumbles and mutters and difficult to 

understand





Mental Status/Objective


Therapy Code Descriptions/Definitions 





Functional [place] Measure:


0=Not Assessed/NA        4=Minimal Assistance


1=Total Assistance        5=Supervision or Setup


2=Maximal Assistance  6=Modified [place]


3=Moderate Assistance 7=Complete [place]





Other Treatment


He was not able to follow verbal or visual cues to do bilat AROM with UEs. He 

was given yellow theraband and recalled enough to do 15-20 reps horizontal abd/

add and elbow extension. He did shoulder flexion actively, holding theraband in 

hands. Pt with difficulty following instructions due to confusion. Pt left up 

in bed with nursing present.





Education


OT Patient Education:  Purpose of tx/functional activities


Teaching Recipient:  Patient


Teaching Methods:  Demonstration, Discussion


Response to Teaching:  Reinforcement Needed





OT Short Term Goals


Short Term Goals


Time Frame:  Dec 7, 2018


Eating(FIM):  5


Upper Body Dressing(FIM):  4


Lower Body Dressing(FIM):  3


Transfers (B,C,W/C) (FIM):  3


Additional Short Term Goals:  1-Demonstrate ADL Tasks, 2-Verbalize Understanding

, 3-ImproveStrength/Samina


1=Demonstrate adherence to instructed precautions during ADL tasks.


2=Patient will verbalize/demonstrate understanding of assistive devices/

modifications for ADL.


3=Patient will improve strength/tolerance for activity to enable patient to 

perform ADL's.





OT Long Term Goals


Long Term Goals


Time Frame:  Dec 14, 2018


Grooming(FIM):  5


Upper Body Dressing(FIM):  5


Lower Body Dressing(FIM):  4


Toileting(FIM):  4


Toilet/Commode Transfer(FIM):  4


Additional Goals:  1-Demonstrate ADL Tasks, 2-Verbalize Understanding, 3-

ImproveStrength/Samina


1=Demonstrate adherence to instructed precautions during ADL tasks.


2=Patient will verbalize/demonstrate understanding of assistive devices/

modifications for ADL.


3=Patient will improve strength/tolerance for activity to enable patient to 

perform ADL's.





OT Education/Plan


Problem List/Assessment


Pt to benefit from skilled OT intervention for ADL training, transfers, 

strengthening, adaptive equipment training, and safety education to increase 

functional independence.





Discharge Recommendations


Plan/Recommendations:  Continue POC





Treatment Plan/Plan of Care


Patient would benefit from OT for education, treatment and training to promote 

independence in ADL's, mobility, safety and/or upper extremity function for ADL'

s.


Plan of Care:  ADL Retraining, Functional Mobility, UE Funct Exercise/Act


Treatment Duration:  Dec 14, 2018


Frequency:  5 times per week


Estimated Hrs Per Day:  .25 hour per day


Agreement:  Yes


Rehab Potential:  Fair





Time/GCodes


Start Time:  11:40


Stop Time:  11:50


Total Time Billed (hr/min):  10


Billed Treatment Time


visit, 10 minutes exercise











FERNANDO SMITH OT Dec 6, 2018 11:59

## 2018-12-07 ENCOUNTER — HOSPITAL ENCOUNTER (INPATIENT)
Dept: HOSPITAL 75 - 4TH | Age: 78
LOS: 11 days | Discharge: NURSING, REHAB OR LONG-TERM CARE | DRG: 94 | End: 2018-12-18
Attending: INTERNAL MEDICINE | Admitting: INTERNAL MEDICINE
Payer: MEDICARE

## 2018-12-07 VITALS — SYSTOLIC BLOOD PRESSURE: 153 MMHG | DIASTOLIC BLOOD PRESSURE: 71 MMHG

## 2018-12-07 VITALS — WEIGHT: 229.06 LBS | HEIGHT: 64 IN | BODY MASS INDEX: 39.11 KG/M2

## 2018-12-07 VITALS — DIASTOLIC BLOOD PRESSURE: 70 MMHG | SYSTOLIC BLOOD PRESSURE: 160 MMHG

## 2018-12-07 VITALS — SYSTOLIC BLOOD PRESSURE: 117 MMHG | DIASTOLIC BLOOD PRESSURE: 53 MMHG

## 2018-12-07 VITALS — DIASTOLIC BLOOD PRESSURE: 74 MMHG | SYSTOLIC BLOOD PRESSURE: 128 MMHG

## 2018-12-07 VITALS — SYSTOLIC BLOOD PRESSURE: 124 MMHG | DIASTOLIC BLOOD PRESSURE: 68 MMHG

## 2018-12-07 DIAGNOSIS — R33.9: ICD-10-CM

## 2018-12-07 DIAGNOSIS — Z66: ICD-10-CM

## 2018-12-07 DIAGNOSIS — B96.5: ICD-10-CM

## 2018-12-07 DIAGNOSIS — B95.2: ICD-10-CM

## 2018-12-07 DIAGNOSIS — R54: ICD-10-CM

## 2018-12-07 DIAGNOSIS — J18.9: ICD-10-CM

## 2018-12-07 DIAGNOSIS — N39.0: ICD-10-CM

## 2018-12-07 DIAGNOSIS — E11.9: ICD-10-CM

## 2018-12-07 DIAGNOSIS — B37.49: ICD-10-CM

## 2018-12-07 DIAGNOSIS — G06.1: Primary | ICD-10-CM

## 2018-12-07 DIAGNOSIS — N50.89: ICD-10-CM

## 2018-12-07 DIAGNOSIS — R41.0: ICD-10-CM

## 2018-12-07 DIAGNOSIS — N28.9: ICD-10-CM

## 2018-12-07 DIAGNOSIS — D50.0: ICD-10-CM

## 2018-12-07 LAB
ALBUMIN SERPL-MCNC: 2.2 GM/DL (ref 3.2–4.5)
ALP SERPL-CCNC: 164 U/L (ref 40–136)
ALT SERPL-CCNC: 41 U/L (ref 0–55)
BASOPHILS # BLD AUTO: 0 10^3/UL (ref 0–0.1)
BASOPHILS NFR BLD AUTO: 0 % (ref 0–10)
BILIRUB SERPL-MCNC: 0.6 MG/DL (ref 0.1–1)
BUN/CREAT SERPL: 8
CALCIUM SERPL-MCNC: 8.5 MG/DL (ref 8.5–10.1)
CHLORIDE SERPL-SCNC: 105 MMOL/L (ref 98–107)
CO2 SERPL-SCNC: 21 MMOL/L (ref 21–32)
CREAT SERPL-MCNC: 1.18 MG/DL (ref 0.6–1.3)
EOSINOPHIL # BLD AUTO: 0.1 10^3/UL (ref 0–0.3)
EOSINOPHIL NFR BLD AUTO: 0 % (ref 0–10)
ERYTHROCYTE [DISTWIDTH] IN BLOOD BY AUTOMATED COUNT: 16.2 % (ref 10–14.5)
ERYTHROCYTE [SEDIMENTATION RATE] IN BLOOD: 64 MM/HR (ref 0–30)
GFR SERPLBLD BASED ON 1.73 SQ M-ARVRAT: 59 ML/MIN
GLUCOSE SERPL-MCNC: 92 MG/DL (ref 70–105)
HCT VFR BLD CALC: 27 % (ref 40–54)
HGB BLD-MCNC: 8.8 G/DL (ref 13.3–17.7)
LYMPHOCYTES # BLD AUTO: 1.7 X 10^3 (ref 1–4)
LYMPHOCYTES NFR BLD AUTO: 10 % (ref 12–44)
MANUAL DIFFERENTIAL PERFORMED BLD QL: YES
MCH RBC QN AUTO: 30 PG (ref 25–34)
MCHC RBC AUTO-ENTMCNC: 33 G/DL (ref 32–36)
MCV RBC AUTO: 91 FL (ref 80–99)
MONOCYTES # BLD AUTO: 1.6 X 10^3 (ref 0–1)
MONOCYTES NFR BLD AUTO: 10 % (ref 0–12)
MONOCYTES NFR BLD: 11 %
NEUTROPHILS # BLD AUTO: 12.7 X 10^3 (ref 1.8–7.8)
NEUTROPHILS NFR BLD AUTO: 79 % (ref 42–75)
NEUTS BAND NFR BLD MANUAL: 83 %
PLATELET # BLD: 372 10^3/UL (ref 130–400)
PMV BLD AUTO: 9.4 FL (ref 7.4–10.4)
POTASSIUM SERPL-SCNC: 3.9 MMOL/L (ref 3.6–5)
PROT SERPL-MCNC: 4.4 GM/DL (ref 6.4–8.2)
RBC # BLD AUTO: 2.97 10^6/UL (ref 4.35–5.85)
SODIUM SERPL-SCNC: 136 MMOL/L (ref 135–145)
VARIANT LYMPHS NFR BLD MANUAL: 6 %
WBC # BLD AUTO: 16 10^3/UL (ref 4.3–11)

## 2018-12-07 PROCEDURE — 94640 AIRWAY INHALATION TREATMENT: CPT

## 2018-12-07 PROCEDURE — 94760 N-INVAS EAR/PLS OXIMETRY 1: CPT

## 2018-12-07 PROCEDURE — 80202 ASSAY OF VANCOMYCIN: CPT

## 2018-12-07 PROCEDURE — 85025 COMPLETE CBC W/AUTO DIFF WBC: CPT

## 2018-12-07 PROCEDURE — 80053 COMPREHEN METABOLIC PANEL: CPT

## 2018-12-07 PROCEDURE — 87324 CLOSTRIDIUM AG IA: CPT

## 2018-12-07 PROCEDURE — 82962 GLUCOSE BLOOD TEST: CPT

## 2018-12-07 PROCEDURE — 87449 NOS EACH ORGANISM AG IA: CPT

## 2018-12-07 PROCEDURE — 83540 ASSAY OF IRON: CPT

## 2018-12-07 PROCEDURE — 71045 X-RAY EXAM CHEST 1 VIEW: CPT

## 2018-12-07 PROCEDURE — 36415 COLL VENOUS BLD VENIPUNCTURE: CPT

## 2018-12-07 RX ADMIN — BETHANECHOL CHLORIDE SCH MG: 25 TABLET ORAL at 21:24

## 2018-12-07 RX ADMIN — BETHANECHOL CHLORIDE SCH MG: 25 TABLET ORAL at 11:35

## 2018-12-07 RX ADMIN — FAMOTIDINE SCH MG: 20 TABLET, FILM COATED ORAL at 09:59

## 2018-12-07 RX ADMIN — SENNOSIDES SCH MG: 8.6 TABLET, FILM COATED ORAL at 21:24

## 2018-12-07 RX ADMIN — Medication SCH EA: at 07:50

## 2018-12-07 RX ADMIN — SIMVASTATIN SCH MG: 10 TABLET, FILM COATED ORAL at 21:24

## 2018-12-07 RX ADMIN — DOCUSATE SODIUM SCH MG: 100 CAPSULE ORAL at 21:24

## 2018-12-07 RX ADMIN — PIOGLITAZONE SCH MG: 30 TABLET ORAL at 07:50

## 2018-12-07 RX ADMIN — METOPROLOL TARTRATE SCH MG: 25 TABLET, FILM COATED ORAL at 09:59

## 2018-12-07 RX ADMIN — FLUCONAZOLE IN SODIUM CHLORIDE SCH MLS/HR: 2 INJECTION, SOLUTION INTRAVENOUS at 10:07

## 2018-12-07 RX ADMIN — ALLOPURINOL SCH MG: 300 TABLET ORAL at 09:59

## 2018-12-07 RX ADMIN — SENNOSIDES SCH MG: 8.6 TABLET, FILM COATED ORAL at 09:59

## 2018-12-07 RX ADMIN — AMLODIPINE BESYLATE SCH MG: 5 TABLET ORAL at 09:59

## 2018-12-07 RX ADMIN — TAMSULOSIN HYDROCHLORIDE SCH MG: 0.4 CAPSULE ORAL at 16:48

## 2018-12-07 RX ADMIN — INSULIN ASPART SCH UNIT: 100 INJECTION, SOLUTION INTRAVENOUS; SUBCUTANEOUS at 06:16

## 2018-12-07 RX ADMIN — AMIODARONE HYDROCHLORIDE SCH MG: 200 TABLET ORAL at 09:59

## 2018-12-07 RX ADMIN — INSULIN ASPART SCH UNIT: 100 INJECTION, SOLUTION INTRAVENOUS; SUBCUTANEOUS at 16:45

## 2018-12-07 RX ADMIN — LACTOBACILLUS ACIDOPHILUS / LACTOBACILLUS BULGARICUS SCH GM: 100 MILLION CFU STRENGTH GRANULES at 21:24

## 2018-12-07 RX ADMIN — ALBUTEROL SULFATE SCH MG: 2.5 SOLUTION RESPIRATORY (INHALATION) at 19:58

## 2018-12-07 RX ADMIN — LACTOBACILLUS ACIDOPHILUS / LACTOBACILLUS BULGARICUS SCH GM: 100 MILLION CFU STRENGTH GRANULES at 11:35

## 2018-12-07 RX ADMIN — INSULIN ASPART SCH UNIT: 100 INJECTION, SOLUTION INTRAVENOUS; SUBCUTANEOUS at 21:37

## 2018-12-07 RX ADMIN — ENOXAPARIN SODIUM SCH MG: 100 INJECTION SUBCUTANEOUS at 10:00

## 2018-12-07 RX ADMIN — ALBUTEROL SULFATE SCH MG: 2.5 SOLUTION RESPIRATORY (INHALATION) at 16:00

## 2018-12-07 RX ADMIN — OXYBUTYNIN CHLORIDE SCH MG: 5 TABLET ORAL at 13:44

## 2018-12-07 RX ADMIN — PHENAZOPYRIDINE HYDROCHLORIDE SCH MG: 100 TABLET ORAL at 09:59

## 2018-12-07 RX ADMIN — OXYBUTYNIN CHLORIDE SCH MG: 5 TABLET ORAL at 21:46

## 2018-12-07 RX ADMIN — RISPERIDONE SCH MG: 0.25 TABLET, FILM COATED ORAL at 09:59

## 2018-12-07 RX ADMIN — OXYBUTYNIN CHLORIDE SCH MG: 5 TABLET ORAL at 09:59

## 2018-12-07 RX ADMIN — ACETAMINOPHEN PRN MG: 325 TABLET ORAL at 17:24

## 2018-12-07 RX ADMIN — ALBUTEROL SULFATE SCH MG: 2.5 SOLUTION RESPIRATORY (INHALATION) at 07:35

## 2018-12-07 RX ADMIN — INSULIN ASPART SCH UNIT: 100 INJECTION, SOLUTION INTRAVENOUS; SUBCUTANEOUS at 11:35

## 2018-12-07 RX ADMIN — BETHANECHOL CHLORIDE SCH MG: 25 TABLET ORAL at 16:50

## 2018-12-07 RX ADMIN — LISINOPRIL SCH MG: 20 TABLET ORAL at 09:59

## 2018-12-07 RX ADMIN — LACTOBACILLUS ACIDOPHILUS / LACTOBACILLUS BULGARICUS SCH GM: 100 MILLION CFU STRENGTH GRANULES at 16:45

## 2018-12-07 RX ADMIN — SODIUM CHLORIDE SCH MLS/HR: 900 INJECTION, SOLUTION INTRAVENOUS at 17:21

## 2018-12-07 RX ADMIN — SODIUM CHLORIDE SCH MG: 900 INJECTION, SOLUTION INTRAVENOUS at 09:59

## 2018-12-07 RX ADMIN — SODIUM CHLORIDE SCH MLS/HR: 900 INJECTION INTRAVENOUS at 11:35

## 2018-12-07 RX ADMIN — RISPERIDONE SCH MG: 0.25 TABLET, FILM COATED ORAL at 21:24

## 2018-12-07 RX ADMIN — LACTOBACILLUS ACIDOPHILUS / LACTOBACILLUS BULGARICUS SCH GM: 100 MILLION CFU STRENGTH GRANULES at 07:50

## 2018-12-07 RX ADMIN — PHENAZOPYRIDINE HYDROCHLORIDE SCH MG: 100 TABLET ORAL at 16:48

## 2018-12-07 RX ADMIN — METFORMIN HYDROCHLORIDE SCH MG: 500 TABLET, FILM COATED ORAL at 07:50

## 2018-12-07 RX ADMIN — DOCUSATE SODIUM SCH MG: 100 CAPSULE ORAL at 09:59

## 2018-12-07 RX ADMIN — SODIUM CHLORIDE SCH MLS/HR: 900 INJECTION INTRAVENOUS at 21:23

## 2018-12-07 RX ADMIN — BETHANECHOL CHLORIDE SCH MG: 25 TABLET ORAL at 07:50

## 2018-12-07 RX ADMIN — METFORMIN HYDROCHLORIDE SCH MG: 500 TABLET, FILM COATED ORAL at 16:45

## 2018-12-07 RX ADMIN — PHENAZOPYRIDINE HYDROCHLORIDE SCH MG: 100 TABLET ORAL at 13:44

## 2018-12-07 RX ADMIN — METOPROLOL TARTRATE SCH MG: 25 TABLET, FILM COATED ORAL at 21:24

## 2018-12-07 RX ADMIN — HALOPERIDOL LACTATE PRN MG: 5 INJECTION, SOLUTION INTRAMUSCULAR at 16:54

## 2018-12-07 NOTE — PHYSICAL THERAPY PROGRESS NOTE
Therapy Progress Note


Patient is currently unable to follow simple direction for exercise or OOB 

activity.  Patient has had a decline in medical status and will be on Hold 

until medically stable.  Physician and RN notified.











DANA CROOKS PT Dec 7, 2018 12:07

## 2018-12-07 NOTE — PROGRESS NOTE-UROLOGY
Progress Note-Urology


Progress Notes/Assess & Plan


Progress/Assessment & Plan


PLAN INDWELLING LEWIS FOR NOW


Final Diagnosis


URINE RETENTION











TAWIL,ELIAS A MD Dec 7, 2018 14:04

## 2018-12-07 NOTE — OCC THERAPY PROGRESS NOTE
Therapy Progress Note


Per PT report, pt is on hold for therapy at this time secondary to decline in 

medical status. Will continue to monitor.











MANJINDER GIRON OT Dec 7, 2018 13:23

## 2018-12-07 NOTE — PROGRESS NOTE-HOSPITALIST
Subjective


HPI/CC On Admission


Date Seen by Provider:  Dec 7, 2018


Time Seen by Provider:  10:30


CC: s/p spinal hardware failure replacement





HPI: This is an 78-year-old white male who was transferred from inpatient 

rehabilitation department to ICU for close monitoring following spinal hardware 

failure replacement yesterday.  He had an uncomplicated surgery and is 

currently recovering and doing well overall.  Urinary output has decreased and 

blood pressure is 115 so will initiate low flow IV fluid of normal saline and 

encouraged him to take in nutrition and fluids today.  He will be fitted for a 

shell back brace and then we will get him out of bed.  Overall patient is doing 

much better wife was updated and patient feels better overall.


Subjective/Events-last exam


Patient now becoming even more complex


Losing ground and recovery is very unlikely


Delirium is worsened


Wife at bedside and updated


Pseudomonas UTI so we'll start cefepime and maintain Diflucan for yeast


Wound VAC changed and wound is very severe and profound


Hemoglobin declining


Overall such extreme poor prognosis I predict transition to comfort care either 

tomorrow or 


Updated surgeon





Review of Systems


Musculoskeletal:  back pain


Neurological:  Confusion





Focused Exam


Lactate Level


18 10:00: Lactic Acid Level 0.64





Lactic Acid Level





Laboratory Tests








Test


 18


10:00


 


Lactic Acid Level


 0.64 MMOL/L


(0.50-2.00)











Objective


Exam


Vital Signs





Vital Signs








  Date Time  Temp Pulse Resp B/P (MAP) Pulse Ox O2 Delivery O2 Flow Rate FiO2


 


18 07:35     92 Nasal Cannula 1.00 


 


18 04:05 98.4 78 22 153/71 (98)    





Capillary Refill :


General Appearance:  No Apparent Distress, WD/WN, Chronically ill, Other (

confused, eyes closed, delirious )


Respiratory:  Chest Non Tender, Lungs Clear, Normal Breath Sounds, No Accessory 

Muscle Use, No Respiratory Distress


Neurologic/Psychiatric:  Disoriented





Results/Procedures


Lab


Laboratory Tests


18 05:00








Patient resulted labs reviewed.





Assessment/Plan


Assessment and Plan


Assess & Plan/Chief Complaint


Assessment:


Acute and severe delirium s/p stopped Fentanyl patch and started Risperdal with 

some improvement but now worsened and required Haldol due to severe aggression 

and delusions and it appears terminal delirium


Recurrent severe back pain with referred lower abdominal pain similar to 

initial presentation obtained labs and CT scan and Dr Bustos consultation which 

revealed displaced hardware in need of repeat surgery s/p on 18 POD # 8


Debility following spinal abscess status post 2 I&D's uncomplicated per Dr Bustos


Urinary retention acute maintained with panda but Dr Tawil consulted and he is 

appreciated but will place in-dwelling catheter for comfort


C. difficile colitis resolved


Severe anemia due to acute blood loss status post 4 units of blood while on 

MedSurg and ICU then 1 unit  then requiring another 2 units 6 days ago


CAD


DM


CRI


Delirium


Yeast UTI


Pseudomonas UTI





Plan:


Pain control but stopped Fentanyl patch since I suspected that to be the main 

factor in delirium but still an issue so increasing Risperdal and give Haldol 

for severe aggression but now appears to be terminal delirium


Nebs


Cefepime for Pseudomonas


Very poor prognosis


Living will evaluation and needs DNR


Likely will decline further and need comfort care this weekend


palliative care





Diagnosis/Problems


Diagnosis/Problems





(1) Terminal care


Status:  Acute


(2) Pseudomonas urinary tract infection


Status:  Acute


(3) Delirium


Status:  Acute


(4) Yeast UTI


Status:  Acute


(5) Loosening of hardware in spine


Status:  Resolved


Resolution Date/Time:  18 @ 12:52


(6) Intraspinal abscess and granuloma


Status:  Resolved


Resolution Date/Time:  18 @ 14:02


(7) Urinary retention


Status:  Acute


(8) Transfusion of blood during current hospitalization


Status:  Acute


(9) Advanced age


Status:  Chronic


(10) C. difficile colitis


Status:  Acute


(11) Renal insufficiency


Status:  Resolved


Resolution Date/Time:  18 @ 09:41


(12) Leukocytosis


Status:  Resolved


Resolution Date/Time:  18 @ 14:02


(13) CAD (coronary artery disease)


Status:  Chronic


(14) Atrial fibrillation


Status:  Chronic


Qualifiers:  


   Atrial fibrillation type:  unspecified  Qualified Codes:  I48.91 - 

Unspecified atrial fibrillation





Clinical Quality Measures


DVT/VTE Risk/Contraindication:


Risk Factor Score Per Nursin


RFS Level Per Nursing on Admit:  4+=Very High











LISA QUIÑONES DO Dec 7, 2018 10:58

## 2018-12-07 NOTE — PROGRESS NOTE (SOAP)
Subjective


Date Seen by a Provider:  Dec 7, 2018


Time Seen by a Provider:  04:40


Subjective/Events-last exam


Patient continues to remain severely confused


VSS


UA positive for pseudomonas


Review of Systems


Pulmonary:  No Cough


Gastrointestinal:  No: Vomiting


Musculoskeletal:  No: back pain


Neurological:  Confusion; No: Weakness





Objective


Exam





Vital Signs








  Date Time  Temp Pulse Resp B/P (MAP) Pulse Ox O2 Delivery O2 Flow Rate FiO2


 


18 04:05 98.4 78 22 153/71 (98) 92 Nasal Cannula 1.00 


 


18 00:03 99.6 92 24 160/70 (100) 94 Nasal Cannula 1.00 


 


18 21:13     93 Nasal Cannula 1.00 


 


18 21:00      Room Air  


 


18 19:05 100.8 90 20 141/74 (96) 93 Nasal Cannula 1.00 


 


18 15:10 100.0 79 24 157/72 (100) 94 Nasal Cannula 1.00 


 


18 12:00 99.6 76 20 106/51 (69) 94 Nasal Cannula 1.00 


 


18 09:00      Room Air  


 


18 07:52 99.6 92 20 124/60 (81) 94 Nasal Cannula 1.00 














I & O 


 


 18





 07:00


 


Intake Total 920 ml


 


Output Total 1265 ml


 


Balance -345 ml





Capillary Refill :


General Appearance:  No Apparent Distress


Gastrointestinal:  soft


Extremity:  No Calf Tenderness, Swelling


Neurologic/Psychiatric:  No Motor/Sensory Deficits, CNs II-XII Norm as Tested, 

Disoriented


Skin:  Other (Wound Vac in place)





Results


Lab


Laboratory Tests


18 05:45: 


White Blood Count 10.6, Red Blood Count 3.19L, Hemoglobin 9.4L, Hematocrit 29L, 

Mean Corpuscular Volume 90, Mean Corpuscular Hemoglobin 29, Mean Corpuscular 

Hemoglobin Concent 33, Red Cell Distribution Width 16.5H, Platelet Count 376, 

Mean Platelet Volume 9.5, Neutrophils (%) (Auto) 74, Lymphocytes (%) (Auto) 12, 

Monocytes (%) (Auto) 11, Eosinophils (%) (Auto) 3, Basophils (%) (Auto) 0, 

Neutrophils # (Auto) 7.8, Lymphocytes # (Auto) 1.3, Monocytes # (Auto) 1.2H, 

Eosinophils # (Auto) 0.3, Basophils # (Auto) 0.0, Sodium Level 134L, Potassium 

Level 4.4, Chloride Level 104, Carbon Dioxide Level 21, Anion Gap 9, Blood Urea 

Nitrogen 10, Creatinine 1.07, Estimat Glomerular Filtration Rate > 60, BUN/

Creatinine Ratio 9, Glucose Level 100, Calcium Level 8.5, Corrected Calcium 9.9

, Total Bilirubin 0.5, Aspartate Amino Transf (AST/SGOT) 52H, Alanine 

Aminotransferase (ALT/SGPT) 39, Alkaline Phosphatase 173H, Total Protein 4.5L, 

Albumin 2.3L, Vancomycin Level Trough 15.4


18 05:48: Glucometer 130H


18 11:23: Glucometer 103


18 15:49: Glucometer 99


18 20:14: Glucometer 130H





Microbiology


18 Urine Culture - Preliminary, Resulted


          Pseudomonas aeruginosa





Assessment/Plan


Assessment/Plan


Assess & Plan/Chief Complaint


Lumbar wound abscess 


S/P I&D with hardware re-instrumentation


Acute Delirium


DM 2





Begin Levaquin for Pseudomonas- will await cultures





Clinical Quality Measures


DVT/VTE Risk/Contraindication:


Risk Factor Score Per Nursin


RFS Level Per Nursing on Admit:  4+=Very High











ANGELA DONG Dec 7, 2018 04:42

## 2018-12-08 VITALS — SYSTOLIC BLOOD PRESSURE: 140 MMHG | DIASTOLIC BLOOD PRESSURE: 60 MMHG

## 2018-12-08 VITALS — SYSTOLIC BLOOD PRESSURE: 118 MMHG | DIASTOLIC BLOOD PRESSURE: 57 MMHG

## 2018-12-08 VITALS — SYSTOLIC BLOOD PRESSURE: 156 MMHG | DIASTOLIC BLOOD PRESSURE: 72 MMHG

## 2018-12-08 VITALS — DIASTOLIC BLOOD PRESSURE: 87 MMHG | SYSTOLIC BLOOD PRESSURE: 107 MMHG

## 2018-12-08 VITALS — DIASTOLIC BLOOD PRESSURE: 65 MMHG | SYSTOLIC BLOOD PRESSURE: 148 MMHG

## 2018-12-08 VITALS — DIASTOLIC BLOOD PRESSURE: 62 MMHG | SYSTOLIC BLOOD PRESSURE: 125 MMHG

## 2018-12-08 LAB
ALBUMIN SERPL-MCNC: 2.2 GM/DL (ref 3.2–4.5)
ALP SERPL-CCNC: 160 U/L (ref 40–136)
ALT SERPL-CCNC: 37 U/L (ref 0–55)
BASOPHILS # BLD AUTO: 0 10^3/UL (ref 0–0.1)
BASOPHILS NFR BLD AUTO: 0 % (ref 0–10)
BILIRUB SERPL-MCNC: 0.5 MG/DL (ref 0.1–1)
BUN/CREAT SERPL: 8
CALCIUM SERPL-MCNC: 7.7 MG/DL (ref 8.5–10.1)
CHLORIDE SERPL-SCNC: 108 MMOL/L (ref 98–107)
CO2 SERPL-SCNC: 21 MMOL/L (ref 21–32)
CREAT SERPL-MCNC: 1.24 MG/DL (ref 0.6–1.3)
EOSINOPHIL # BLD AUTO: 0.3 10^3/UL (ref 0–0.3)
EOSINOPHIL NFR BLD AUTO: 3 % (ref 0–10)
ERYTHROCYTE [DISTWIDTH] IN BLOOD BY AUTOMATED COUNT: 16.9 % (ref 10–14.5)
GFR SERPLBLD BASED ON 1.73 SQ M-ARVRAT: 56 ML/MIN
GLUCOSE SERPL-MCNC: 134 MG/DL (ref 70–105)
HCT VFR BLD CALC: 27 % (ref 40–54)
HGB BLD-MCNC: 8.7 G/DL (ref 13.3–17.7)
LYMPHOCYTES # BLD AUTO: 1.2 X 10^3 (ref 1–4)
LYMPHOCYTES NFR BLD AUTO: 11 % (ref 12–44)
MANUAL DIFFERENTIAL PERFORMED BLD QL: NO
MCH RBC QN AUTO: 30 PG (ref 25–34)
MCHC RBC AUTO-ENTMCNC: 33 G/DL (ref 32–36)
MCV RBC AUTO: 92 FL (ref 80–99)
MONOCYTES # BLD AUTO: 1 X 10^3 (ref 0–1)
MONOCYTES NFR BLD AUTO: 9 % (ref 0–12)
NEUTROPHILS # BLD AUTO: 9 X 10^3 (ref 1.8–7.8)
NEUTROPHILS NFR BLD AUTO: 77 % (ref 42–75)
PLATELET # BLD: 330 10^3/UL (ref 130–400)
PMV BLD AUTO: 9.4 FL (ref 7.4–10.4)
POTASSIUM SERPL-SCNC: 3.7 MMOL/L (ref 3.6–5)
PROT SERPL-MCNC: 4.4 GM/DL (ref 6.4–8.2)
RBC # BLD AUTO: 2.91 10^6/UL (ref 4.35–5.85)
SODIUM SERPL-SCNC: 139 MMOL/L (ref 135–145)
WBC # BLD AUTO: 11.6 10^3/UL (ref 4.3–11)

## 2018-12-08 RX ADMIN — PHENAZOPYRIDINE HYDROCHLORIDE SCH MG: 100 TABLET ORAL at 12:37

## 2018-12-08 RX ADMIN — SENNOSIDES SCH MG: 8.6 TABLET, FILM COATED ORAL at 09:12

## 2018-12-08 RX ADMIN — AMLODIPINE BESYLATE SCH MG: 5 TABLET ORAL at 09:55

## 2018-12-08 RX ADMIN — RISPERIDONE SCH MG: 0.25 TABLET, FILM COATED ORAL at 20:31

## 2018-12-08 RX ADMIN — ALLOPURINOL SCH MG: 300 TABLET ORAL at 09:55

## 2018-12-08 RX ADMIN — INSULIN ASPART SCH UNIT: 100 INJECTION, SOLUTION INTRAVENOUS; SUBCUTANEOUS at 05:07

## 2018-12-08 RX ADMIN — DOCUSATE SODIUM SCH MG: 100 CAPSULE ORAL at 20:31

## 2018-12-08 RX ADMIN — INSULIN ASPART SCH UNIT: 100 INJECTION, SOLUTION INTRAVENOUS; SUBCUTANEOUS at 11:18

## 2018-12-08 RX ADMIN — ALBUTEROL SULFATE SCH MG: 2.5 SOLUTION RESPIRATORY (INHALATION) at 14:25

## 2018-12-08 RX ADMIN — PHENAZOPYRIDINE HYDROCHLORIDE SCH MG: 100 TABLET ORAL at 09:11

## 2018-12-08 RX ADMIN — Medication SCH EA: at 07:54

## 2018-12-08 RX ADMIN — BETHANECHOL CHLORIDE SCH MG: 25 TABLET ORAL at 16:21

## 2018-12-08 RX ADMIN — TAMSULOSIN HYDROCHLORIDE SCH MG: 0.4 CAPSULE ORAL at 16:38

## 2018-12-08 RX ADMIN — SODIUM CHLORIDE SCH MLS/HR: 900 INJECTION, SOLUTION INTRAVENOUS at 08:08

## 2018-12-08 RX ADMIN — INSULIN ASPART SCH UNIT: 100 INJECTION, SOLUTION INTRAVENOUS; SUBCUTANEOUS at 20:51

## 2018-12-08 RX ADMIN — LACTOBACILLUS ACIDOPHILUS / LACTOBACILLUS BULGARICUS SCH GM: 100 MILLION CFU STRENGTH GRANULES at 16:22

## 2018-12-08 RX ADMIN — ALBUTEROL SULFATE SCH MG: 2.5 SOLUTION RESPIRATORY (INHALATION) at 06:35

## 2018-12-08 RX ADMIN — BETHANECHOL CHLORIDE SCH MG: 25 TABLET ORAL at 20:31

## 2018-12-08 RX ADMIN — OXYBUTYNIN CHLORIDE SCH MG: 5 TABLET ORAL at 12:37

## 2018-12-08 RX ADMIN — LACTOBACILLUS ACIDOPHILUS / LACTOBACILLUS BULGARICUS SCH GM: 100 MILLION CFU STRENGTH GRANULES at 10:08

## 2018-12-08 RX ADMIN — SIMVASTATIN SCH MG: 10 TABLET, FILM COATED ORAL at 20:31

## 2018-12-08 RX ADMIN — OXYBUTYNIN CHLORIDE SCH MG: 5 TABLET ORAL at 09:55

## 2018-12-08 RX ADMIN — SODIUM CHLORIDE SCH MLS/HR: 900 INJECTION, SOLUTION INTRAVENOUS at 20:32

## 2018-12-08 RX ADMIN — PIOGLITAZONE SCH MG: 30 TABLET ORAL at 07:54

## 2018-12-08 RX ADMIN — METFORMIN HYDROCHLORIDE SCH MG: 500 TABLET, FILM COATED ORAL at 07:54

## 2018-12-08 RX ADMIN — PHENAZOPYRIDINE HYDROCHLORIDE SCH MG: 100 TABLET ORAL at 16:38

## 2018-12-08 RX ADMIN — BETHANECHOL CHLORIDE SCH MG: 25 TABLET ORAL at 07:54

## 2018-12-08 RX ADMIN — ACETAMINOPHEN PRN MG: 325 TABLET ORAL at 10:08

## 2018-12-08 RX ADMIN — ENOXAPARIN SODIUM SCH MG: 100 INJECTION SUBCUTANEOUS at 10:08

## 2018-12-08 RX ADMIN — SODIUM CHLORIDE SCH MLS/HR: 900 INJECTION INTRAVENOUS at 08:09

## 2018-12-08 RX ADMIN — HALOPERIDOL LACTATE PRN MG: 5 INJECTION, SOLUTION INTRAMUSCULAR at 01:16

## 2018-12-08 RX ADMIN — RISPERIDONE SCH MG: 0.25 TABLET, FILM COATED ORAL at 09:55

## 2018-12-08 RX ADMIN — FAMOTIDINE SCH MG: 20 TABLET, FILM COATED ORAL at 09:56

## 2018-12-08 RX ADMIN — OXYBUTYNIN CHLORIDE SCH MG: 5 TABLET ORAL at 20:31

## 2018-12-08 RX ADMIN — LACTOBACILLUS ACIDOPHILUS / LACTOBACILLUS BULGARICUS SCH GM: 100 MILLION CFU STRENGTH GRANULES at 07:53

## 2018-12-08 RX ADMIN — METOPROLOL TARTRATE SCH MG: 25 TABLET, FILM COATED ORAL at 09:55

## 2018-12-08 RX ADMIN — DOCUSATE SODIUM SCH MG: 100 CAPSULE ORAL at 09:11

## 2018-12-08 RX ADMIN — METFORMIN HYDROCHLORIDE SCH MG: 500 TABLET, FILM COATED ORAL at 16:21

## 2018-12-08 RX ADMIN — ALBUTEROL SULFATE SCH MG: 2.5 SOLUTION RESPIRATORY (INHALATION) at 20:18

## 2018-12-08 RX ADMIN — INSULIN ASPART SCH UNIT: 100 INJECTION, SOLUTION INTRAVENOUS; SUBCUTANEOUS at 16:22

## 2018-12-08 RX ADMIN — SENNOSIDES SCH MG: 8.6 TABLET, FILM COATED ORAL at 20:31

## 2018-12-08 RX ADMIN — FLUCONAZOLE IN SODIUM CHLORIDE SCH MLS/HR: 2 INJECTION, SOLUTION INTRAVENOUS at 08:35

## 2018-12-08 RX ADMIN — SODIUM CHLORIDE SCH MLS/HR: 900 INJECTION, SOLUTION INTRAVENOUS at 09:55

## 2018-12-08 RX ADMIN — SODIUM CHLORIDE SCH MLS/HR: 900 INJECTION INTRAVENOUS at 20:32

## 2018-12-08 RX ADMIN — METOPROLOL TARTRATE SCH MG: 25 TABLET, FILM COATED ORAL at 20:31

## 2018-12-08 RX ADMIN — BETHANECHOL CHLORIDE SCH MG: 25 TABLET ORAL at 10:08

## 2018-12-08 RX ADMIN — LISINOPRIL SCH MG: 20 TABLET ORAL at 09:12

## 2018-12-08 RX ADMIN — AMIODARONE HYDROCHLORIDE SCH MG: 200 TABLET ORAL at 09:55

## 2018-12-08 RX ADMIN — LACTOBACILLUS ACIDOPHILUS / LACTOBACILLUS BULGARICUS SCH GM: 100 MILLION CFU STRENGTH GRANULES at 20:30

## 2018-12-08 NOTE — PROGRESS NOTE-UROLOGY
Progress Note-Urology


Progress Notes/Assess & Plan


Progress/Assessment & Plan


BETTER TODAY. LEWIS IN. ONCE GENERAL AND ORTHO CONDITION BETTER, WE WILL TRY 

AGAIN TOV


Final Diagnosis


URINE RETENTION











TAWIL,ELIAS A MD Dec 8, 2018 09:51

## 2018-12-08 NOTE — PROGRESS NOTE-HOSPITALIST
Subjective


HPI/CC On Admission


Date Seen by Provider:  Dec 8, 2018


Time Seen by Provider:  10:00


CC: s/p spinal hardware failure replacement





HPI: This is an 78-year-old white male who was transferred from inpatient 

rehabilitation department to ICU for close monitoring following spinal hardware 

failure replacement yesterday.  He had an uncomplicated surgery and is 

currently recovering and doing well overall.  Urinary output has decreased and 

blood pressure is 115 so will initiate low flow IV fluid of normal saline and 

encouraged him to take in nutrition and fluids today.  He will be fitted for a 

shell back brace and then we will get him out of bed.  Overall patient is doing 

much better wife was updated and patient feels better overall.


Subjective/Events-last exam


Delirium clearing just this morning


Pt somewhat confused at times but much improved


DNR obtained yesterday


Updated wife and son outside in [place]


Pt will need to motivate to recover since so many complexities to overcome I am 

unsure he can recover but will continue current treatment now


Back pain at times


Edema noted so will DC IVF and give Lasix since BP elevated


Wife aware of the prognosis





Review of Systems


General:  Fatigue


Musculoskeletal:  back pain


Neurological:  Confusion





Focused Exam


Lactate Level


18 10:00: Lactic Acid Level 0.64








Objective


Exam


Vital Signs





Vital Signs








  Date Time  Temp Pulse Resp B/P (MAP) Pulse Ox O2 Delivery O2 Flow Rate FiO2


 


18 12:00 98.1 73 22 156/72 (100) 94 Room Air  


 


18 07:35       1.00 





Capillary Refill :


General Appearance:  No Apparent Distress, WD/WN, Chronically ill, Obese


Respiratory:  Chest Non Tender, Lungs Clear, Normal Breath Sounds, No Accessory 

Muscle Use, No Respiratory Distress


Cardiovascular:  Regular Rate, Rhythm, No Edema, No Gallop, No JVD, No Murmur, 

Normal Peripheral Pulses


Neurologic/Psychiatric:  Alert, CNs II-XII Norm as Tested, Disoriented (but 

confusion is clearing)





Results/Procedures


Lab


Laboratory Tests


18 12:00








Patient resulted labs reviewed.





Assessment/Plan


Assessment and Plan


Assess & Plan/Chief Complaint


Assessment:


Acute and severe delirium s/p stopped Fentanyl patch and started Risperdal with 

some improvement but now worsened and required Haldol due to severe aggression 

and delusions and it appears terminal delirium but now recovered a bit today 18


Recurrent severe back pain with referred lower abdominal pain similar to 

initial presentation obtained labs and CT scan and Dr Bustos consultation which 

revealed displaced hardware in need of repeat surgery s/p on 18 POD # 9


Debility following spinal abscess status post 2 I&D's uncomplicated per Dr Bustos


Urinary retention acute maintained with panda but Dr Tawil consulted and he is 

appreciated but will place in-dwelling catheter for comfort


C. difficile colitis resolved


Severe anemia due to acute blood loss status post 4 units of blood while on 

MedSurg and ICU then 1 unit  then requiring another 2 units 7 days ago


CAD


DM


CRI


Delirium


Yeast UTI


Pseudomonas UTI





Plan:


Pain control but stopped Fentanyl patch since I suspected that to be the main 

factor in delirium but still an issue so increasing Risperdal and give Haldol 

for severe aggression but now appears to be terminal delirium but cleared a bit 

today 18


Nebs


Cefepime for Pseudomonas


Very poor prognosis


Living will evaluation and needs DNR


Likely will decline further and need comfort care this weekend


Palliative care





Diagnosis/Problems


Diagnosis/Problems





(1) Terminal care


Status:  Acute


(2) Pseudomonas urinary tract infection


Status:  Acute


(3) Delirium


Status:  Acute


(4) Yeast UTI


Status:  Acute


(5) Loosening of hardware in spine


Status:  Resolved


Resolution Date/Time:  18 @ 12:52


(6) Intraspinal abscess and granuloma


Status:  Resolved


Resolution Date/Time:  18 @ 14:02


(7) Urinary retention


Status:  Acute


(8) Transfusion of blood during current hospitalization


Status:  Acute


(9) Advanced age


Status:  Chronic


(10) C. difficile colitis


Status:  Acute


(11) Renal insufficiency


Status:  Resolved


Resolution Date/Time:  18 @ 09:41


(12) Leukocytosis


Status:  Resolved


Resolution Date/Time:  18 @ 14:02


(13) CAD (coronary artery disease)


Status:  Chronic


(14) Atrial fibrillation


Status:  Chronic


Qualifiers:  


   Atrial fibrillation type:  unspecified  Qualified Codes:  I48.91 - 

Unspecified atrial fibrillation





Clinical Quality Measures


DVT/VTE Risk/Contraindication:


Risk Factor Score Per Nursin


RFS Level Per Nursing on Admit:  4+=Very High











LISA QUIÑONES DO Dec 8, 2018 10:35

## 2018-12-08 NOTE — PROGRESS NOTE (SOAP)
Subjective


Date Seen by a Provider:  Dec 8, 2018


Time Seen by a Provider:  09:52


Subjective/Events-last exam


Patient s/p lumbar wound abscess requiring I&D and hardware revision


Acute delirium requiring Haldol last night


Patient's wife and son at bedside. Patient verbal and called me by name when I 

entered the room. Did have have some aphasia and confusion during the exam, but 

asked about his urinary tract infection, and if he would have to remain in bed.


Review of Systems


General:  No Chills


Pulmonary:  Cough


Genitourinary:  Retention


Musculoskeletal:  No: leg pain


Neurological:  Confusion





Focused Exam


Lactate Level


18 10:00: Lactic Acid Level 0.64





Objective


Exam





Vital Signs








  Date Time  Temp Pulse Resp B/P (MAP) Pulse Ox O2 Delivery O2 Flow Rate FiO2


 


18 09:15      Room Air  


 


18 08:11      Room Air  


 


18 04:00  73 20 140/60 (86) 94 Room Air  


 


18 00:46  73 21 107/87 (94) 93 Room Air  


 


18 21:32  70  124/68 (86)    


 


18 21:00      Room Air  


 


18 20:07      Room Air  


 


18 20:00 98.6 73 22 128/74 (92) 95 Room Air  


 


18 18:30 99.8       


 


18 17:24 100.0       


 


18 16:00 100.1 72 20 117/53 (74) 93 Room Air  














I & O 


 


 18





 07:00


 


Intake Total 650 ml


 


Output Total 1276 ml


 


Balance -626 ml





Capillary Refill :


General Appearance:  No Apparent Distress


Respiratory:  No Respiratory Distress


Gastrointestinal:  non tender, soft


Extremity:  No Calf Tenderness, Pedal Edema


Neurologic/Psychiatric:  Alert, No Motor/Sensory Deficits, CNs II-XII Norm as 

Tested


Skin:  Other (wound vac in place)





Results


Lab


Laboratory Tests


18 10:00: Lactic Acid Level 0.64


18 11:24: Glucometer 88


18 21:35: Glucometer 74


18 04:02: Glucometer 102





Microbiology


18 Urine Culture - Final, Complete


          Pseudomonas aeruginosa





Assessment/Plan


Assessment/Plan


Assess & Plan/Chief Complaint


Lumbar wound abscess 


S/P I&D with hardware re-instrumentation


Acute Delirium


DM 2


UTI





I discussed the patient's prognosis in detail with wife and son. The patient 

does appear much better today, however he did receive Haldol last night, and 

has multiple comorbidities. Given his age, delirium, and current medical 

condition, my prognosis is still guarded. However, He does appear much better 

after completing 24 hours of IV antibiotics directed toward his urinary tract 

infection. I believe that the next 48 hours will be critical in deciding what 

treatment approach is most suited for the patient.





Clinical Quality Measures


DVT/VTE Risk/Contraindication:


Risk Factor Score Per Nursin


RFS Level Per Nursing on Admit:  4+=Very High











ANGELA DONG Dec 8, 2018 10:02

## 2018-12-09 VITALS — SYSTOLIC BLOOD PRESSURE: 102 MMHG | DIASTOLIC BLOOD PRESSURE: 56 MMHG

## 2018-12-09 VITALS — SYSTOLIC BLOOD PRESSURE: 111 MMHG | DIASTOLIC BLOOD PRESSURE: 51 MMHG

## 2018-12-09 VITALS — DIASTOLIC BLOOD PRESSURE: 50 MMHG | SYSTOLIC BLOOD PRESSURE: 112 MMHG

## 2018-12-09 VITALS — DIASTOLIC BLOOD PRESSURE: 60 MMHG | SYSTOLIC BLOOD PRESSURE: 136 MMHG

## 2018-12-09 VITALS — SYSTOLIC BLOOD PRESSURE: 142 MMHG | DIASTOLIC BLOOD PRESSURE: 61 MMHG

## 2018-12-09 VITALS — SYSTOLIC BLOOD PRESSURE: 139 MMHG | DIASTOLIC BLOOD PRESSURE: 65 MMHG

## 2018-12-09 LAB
ALBUMIN SERPL-MCNC: 2.2 GM/DL (ref 3.2–4.5)
ALP SERPL-CCNC: 146 U/L (ref 40–136)
ALT SERPL-CCNC: 37 U/L (ref 0–55)
BASOPHILS # BLD AUTO: 0 10^3/UL (ref 0–0.1)
BASOPHILS NFR BLD AUTO: 0 % (ref 0–10)
BILIRUB SERPL-MCNC: 0.5 MG/DL (ref 0.1–1)
BUN/CREAT SERPL: 9
CALCIUM SERPL-MCNC: 7.8 MG/DL (ref 8.5–10.1)
CHLORIDE SERPL-SCNC: 107 MMOL/L (ref 98–107)
CO2 SERPL-SCNC: 22 MMOL/L (ref 21–32)
CREAT SERPL-MCNC: 1.28 MG/DL (ref 0.6–1.3)
EOSINOPHIL # BLD AUTO: 0.3 10^3/UL (ref 0–0.3)
EOSINOPHIL NFR BLD AUTO: 4 % (ref 0–10)
ERYTHROCYTE [DISTWIDTH] IN BLOOD BY AUTOMATED COUNT: 16.7 % (ref 10–14.5)
GFR SERPLBLD BASED ON 1.73 SQ M-ARVRAT: 54 ML/MIN
GLUCOSE SERPL-MCNC: 82 MG/DL (ref 70–105)
HCT VFR BLD CALC: 26 % (ref 40–54)
HGB BLD-MCNC: 8.5 G/DL (ref 13.3–17.7)
LYMPHOCYTES # BLD AUTO: 1.5 X 10^3 (ref 1–4)
LYMPHOCYTES NFR BLD AUTO: 15 % (ref 12–44)
MANUAL DIFFERENTIAL PERFORMED BLD QL: NO
MCH RBC QN AUTO: 30 PG (ref 25–34)
MCHC RBC AUTO-ENTMCNC: 33 G/DL (ref 32–36)
MCV RBC AUTO: 92 FL (ref 80–99)
MONOCYTES # BLD AUTO: 0.9 X 10^3 (ref 0–1)
MONOCYTES NFR BLD AUTO: 10 % (ref 0–12)
NEUTROPHILS # BLD AUTO: 7 X 10^3 (ref 1.8–7.8)
NEUTROPHILS NFR BLD AUTO: 71 % (ref 42–75)
PLATELET # BLD: 316 10^3/UL (ref 130–400)
PMV BLD AUTO: 9.4 FL (ref 7.4–10.4)
POTASSIUM SERPL-SCNC: 3.3 MMOL/L (ref 3.6–5)
PROT SERPL-MCNC: 4.2 GM/DL (ref 6.4–8.2)
RBC # BLD AUTO: 2.84 10^6/UL (ref 4.35–5.85)
SODIUM SERPL-SCNC: 139 MMOL/L (ref 135–145)
WBC # BLD AUTO: 9.8 10^3/UL (ref 4.3–11)

## 2018-12-09 RX ADMIN — ALBUTEROL SULFATE SCH MG: 2.5 SOLUTION RESPIRATORY (INHALATION) at 06:46

## 2018-12-09 RX ADMIN — INSULIN ASPART SCH UNIT: 100 INJECTION, SOLUTION INTRAVENOUS; SUBCUTANEOUS at 05:34

## 2018-12-09 RX ADMIN — DOCUSATE SODIUM SCH MG: 100 CAPSULE ORAL at 21:43

## 2018-12-09 RX ADMIN — AMIODARONE HYDROCHLORIDE SCH MG: 200 TABLET ORAL at 09:44

## 2018-12-09 RX ADMIN — SODIUM CHLORIDE SCH MLS/HR: 900 INJECTION, SOLUTION INTRAVENOUS at 13:41

## 2018-12-09 RX ADMIN — SIMVASTATIN SCH MG: 10 TABLET, FILM COATED ORAL at 21:43

## 2018-12-09 RX ADMIN — ALLOPURINOL SCH MG: 300 TABLET ORAL at 09:44

## 2018-12-09 RX ADMIN — ALBUTEROL SULFATE SCH MG: 2.5 SOLUTION RESPIRATORY (INHALATION) at 15:31

## 2018-12-09 RX ADMIN — OXYBUTYNIN CHLORIDE SCH MG: 5 TABLET ORAL at 14:01

## 2018-12-09 RX ADMIN — LACTOBACILLUS ACIDOPHILUS / LACTOBACILLUS BULGARICUS SCH GM: 100 MILLION CFU STRENGTH GRANULES at 05:36

## 2018-12-09 RX ADMIN — OXYBUTYNIN CHLORIDE SCH MG: 5 TABLET ORAL at 09:44

## 2018-12-09 RX ADMIN — FAMOTIDINE SCH MG: 20 TABLET, FILM COATED ORAL at 09:44

## 2018-12-09 RX ADMIN — METOPROLOL TARTRATE SCH MG: 25 TABLET, FILM COATED ORAL at 21:42

## 2018-12-09 RX ADMIN — INSULIN ASPART SCH UNIT: 100 INJECTION, SOLUTION INTRAVENOUS; SUBCUTANEOUS at 16:12

## 2018-12-09 RX ADMIN — SODIUM CHLORIDE SCH MLS/HR: 900 INJECTION INTRAVENOUS at 21:42

## 2018-12-09 RX ADMIN — LISINOPRIL SCH MG: 20 TABLET ORAL at 09:44

## 2018-12-09 RX ADMIN — SODIUM CHLORIDE SCH MG: 900 INJECTION, SOLUTION INTRAVENOUS at 09:43

## 2018-12-09 RX ADMIN — METOPROLOL TARTRATE SCH MG: 25 TABLET, FILM COATED ORAL at 09:44

## 2018-12-09 RX ADMIN — Medication SCH EA: at 05:36

## 2018-12-09 RX ADMIN — PHENAZOPYRIDINE HYDROCHLORIDE SCH MG: 100 TABLET ORAL at 09:44

## 2018-12-09 RX ADMIN — INSULIN ASPART SCH UNIT: 100 INJECTION, SOLUTION INTRAVENOUS; SUBCUTANEOUS at 22:06

## 2018-12-09 RX ADMIN — SODIUM CHLORIDE SCH MLS/HR: 900 INJECTION INTRAVENOUS at 09:45

## 2018-12-09 RX ADMIN — METFORMIN HYDROCHLORIDE SCH MG: 500 TABLET, FILM COATED ORAL at 16:36

## 2018-12-09 RX ADMIN — BETHANECHOL CHLORIDE SCH MG: 25 TABLET ORAL at 12:18

## 2018-12-09 RX ADMIN — METFORMIN HYDROCHLORIDE SCH MG: 500 TABLET, FILM COATED ORAL at 07:00

## 2018-12-09 RX ADMIN — RISPERIDONE SCH MG: 0.25 TABLET, FILM COATED ORAL at 09:44

## 2018-12-09 RX ADMIN — LACTOBACILLUS ACIDOPHILUS / LACTOBACILLUS BULGARICUS SCH GM: 100 MILLION CFU STRENGTH GRANULES at 21:43

## 2018-12-09 RX ADMIN — PHENAZOPYRIDINE HYDROCHLORIDE SCH MG: 100 TABLET ORAL at 14:01

## 2018-12-09 RX ADMIN — BETHANECHOL CHLORIDE SCH MG: 25 TABLET ORAL at 21:43

## 2018-12-09 RX ADMIN — OXYBUTYNIN CHLORIDE SCH MG: 5 TABLET ORAL at 21:43

## 2018-12-09 RX ADMIN — INSULIN ASPART SCH UNIT: 100 INJECTION, SOLUTION INTRAVENOUS; SUBCUTANEOUS at 11:14

## 2018-12-09 RX ADMIN — POTASSIUM CHLORIDE SCH MEQ: 750 TABLET, FILM COATED, EXTENDED RELEASE ORAL at 21:42

## 2018-12-09 RX ADMIN — TAMSULOSIN HYDROCHLORIDE SCH MG: 0.4 CAPSULE ORAL at 18:16

## 2018-12-09 RX ADMIN — ALBUTEROL SULFATE SCH MG: 2.5 SOLUTION RESPIRATORY (INHALATION) at 20:19

## 2018-12-09 RX ADMIN — BETHANECHOL CHLORIDE SCH MG: 25 TABLET ORAL at 05:36

## 2018-12-09 RX ADMIN — ENOXAPARIN SODIUM SCH MG: 100 INJECTION SUBCUTANEOUS at 09:45

## 2018-12-09 RX ADMIN — DOCUSATE SODIUM SCH MG: 100 CAPSULE ORAL at 09:44

## 2018-12-09 RX ADMIN — SENNOSIDES SCH MG: 8.6 TABLET, FILM COATED ORAL at 09:44

## 2018-12-09 RX ADMIN — PIOGLITAZONE SCH MG: 30 TABLET ORAL at 07:00

## 2018-12-09 RX ADMIN — FLUCONAZOLE IN SODIUM CHLORIDE SCH MLS/HR: 2 INJECTION, SOLUTION INTRAVENOUS at 09:45

## 2018-12-09 RX ADMIN — AMLODIPINE BESYLATE SCH MG: 5 TABLET ORAL at 09:44

## 2018-12-09 RX ADMIN — PHENAZOPYRIDINE HYDROCHLORIDE SCH MG: 100 TABLET ORAL at 18:16

## 2018-12-09 RX ADMIN — LACTOBACILLUS ACIDOPHILUS / LACTOBACILLUS BULGARICUS SCH GM: 100 MILLION CFU STRENGTH GRANULES at 16:32

## 2018-12-09 RX ADMIN — RISPERIDONE SCH MG: 0.25 TABLET, FILM COATED ORAL at 21:42

## 2018-12-09 RX ADMIN — BETHANECHOL CHLORIDE SCH MG: 25 TABLET ORAL at 16:32

## 2018-12-09 RX ADMIN — SENNOSIDES SCH MG: 8.6 TABLET, FILM COATED ORAL at 21:44

## 2018-12-09 NOTE — PROGRESS NOTE-HOSPITALIST
Subjective


HPI/CC On Admission


Date Seen by Provider:  Dec 9, 2018


Time Seen by Provider:  10:30


CC: s/p spinal hardware failure replacement





HPI: This is an 78-year-old white male who was transferred from inpatient 

rehabilitation department to ICU for close monitoring following spinal hardware 

failure replacement yesterday.  He had an uncomplicated surgery and is 

currently recovering and doing well overall.  Urinary output has decreased and 

blood pressure is 115 so will initiate low flow IV fluid of normal saline and 

encouraged him to take in nutrition and fluids today.  He will be fitted for a 

shell back brace and then we will get him out of bed.  Overall patient is doing 

much better wife was updated and patient feels better overall.


Subjective/Events-last exam


Patient more lucid today


Wife says confusion comes and goes


Family the bedside


Patient was tearful and depressed but I tried to reassure him since things are 

actually going in the right direction


Family member met me outside in the [place] after exam and interview and asked why 

he was so dire on Friday and now he was doing okay and I told her that he has 

so many medical problems that although he is responding to treatment he has a 

long way to go before recovery so anything can happen at any time


Patient denies any pain


Will be up in a chair today


Catheter in place


Edema much improved after Lasix was given


Creatinine stable


Low potassium will be supplemented


Lactinex will be started to try to avoid C. difficile colitis recurrence





Review of Systems


General:  Fatigue


Neurological:  Other (depression)





Focused Exam


Lactate Level


18 10:00: Lactic Acid Level 0.64








Objective


Exam


Vital Signs





Vital Signs








  Date Time  Temp Pulse Resp B/P (MAP) Pulse Ox O2 Delivery O2 Flow Rate FiO2


 


18 08:15 99.8 74 20 136/60 (85) 92 Room Air  


 


18 06:46        91


 


18 07:35       1.00 





Capillary Refill :


General Appearance:  No Apparent Distress, WD/WN, Chronically ill


Respiratory:  Chest Non Tender, Lungs Clear, Normal Breath Sounds, No Accessory 

Muscle Use, No Respiratory Distress


Cardiovascular:  Regular Rate, Rhythm, No Edema, No Gallop, No JVD, No Murmur, 

Normal Peripheral Pulses


Neurologic/Psychiatric:  Alert, Oriented x3, No Motor/Sensory Deficits, Normal 

Mood/Affect





Results/Procedures


Lab


Laboratory Tests


18 12:00








18 05:25








Patient resulted labs reviewed.





Assessment/Plan


Assessment and Plan


Assess & Plan/Chief Complaint


Assessment:


Acute and severe delirium s/p stopped Fentanyl patch and started Risperdal with 

some improvement but then worsened and required Haldol due to severe aggression 

and delusions and it appears terminal delirium but now recovered a bit 

yesterday and today 18 which may lead to restarting rehab phase


Recurrent severe back pain with referred lower abdominal pain similar to 

initial presentation obtained labs and CT scan and Dr Bustos consultation which 

revealed displaced hardware in need of repeat surgery s/p on 18 POD # 9


Debility following spinal abscess status post 2 I&D's uncomplicated per Dr Bustos


Urinary retention acute maintained with panda but Dr Tawil consulted and he is 

appreciated but will place in-dwelling catheter for comfort


C. difficile colitis resolved


Severe anemia due to acute blood loss status post 4 units of blood while on 

MedSurg and ICU then 1 unit  then requiring another 2 units 7 days ago


CAD


DM


CRI stable 1.5 baseline


Yeast UTI


Pseudomonas UTI on Cefepime


Edema- resolved after Lasix given


Hypokalemia





Plan:


Pain control but stopped Fentanyl patch since I suspected that to be the main 

factor in delirium but still an issue so increasing Risperdal and give Haldol 

for severe aggression but now appears to be terminal delirium but cleared a bit 

more today 18


Nebs


Cefepime for Pseudomonas


Guarded prognosis nonetheless although he appears to be improved immensely today


Lactinex


Potassium supplement


SB evaluation





Diagnosis/Problems


Diagnosis/Problems





(1) Pseudomonas urinary tract infection


Status:  Acute


(2) Delirium


Status:  Resolved


Resolution Date/Time:  18 @ 11:27


(3) Yeast UTI


Status:  Acute


(4) Loosening of hardware in spine


Status:  Resolved


Resolution Date/Time:  18 @ 12:52


(5) Intraspinal abscess and granuloma


Status:  Resolved


Resolution Date/Time:  18 @ 14:02


(6) Urinary retention


Status:  Acute


(7) Transfusion of blood during current hospitalization


Status:  Acute


(8) Advanced age


Status:  Chronic


(9) C. difficile colitis


Status:  Resolved


Resolution Date/Time:  18 @ 11:27


(10) Renal insufficiency


Status:  Resolved


Resolution Date/Time:  18 @ 09:41


(11) Leukocytosis


Status:  Resolved


Qualifiers:  


   Leukocytosis type:  leukemoid reaction  Qualified Codes:  D72.823 - 

Leukemoid reaction


Resolution Date/Time:  18 @ 14:02


(12) CAD (coronary artery disease)


Status:  Chronic


Qualifiers:  


   Coronary Disease-Associated Artery/Lesion type:  native artery  Native vs. 

transplanted heart:  native heart  Associated angina:  without angina  

Qualified Codes:  I25.10 - Atherosclerotic heart disease of native coronary 

artery without angina pectoris


(13) Atrial fibrillation


Status:  Chronic


Qualifiers:  


   Atrial fibrillation type:  unspecified  Qualified Codes:  I48.91 - 

Unspecified atrial fibrillation


(14) Hypokalemia


Status:  Acute


(15) Edema


Status:  Acute


Qualifiers:  


   Edema type:  localized  Qualified Codes:  R60.0 - Localized edema


(16) In guarded condition


Status:  Acute





Clinical Quality Measures


DVT/VTE Risk/Contraindication:


Risk Factor Score Per Nursin


RFS Level Per Nursing on Admit:  4+=Very High











LISA QUIÑONES DO Dec 9, 2018 11:26

## 2018-12-09 NOTE — PROGRESS NOTE (SOAP)
Subjective


Date Seen by a Provider:  Dec 9, 2018


Time Seen by a Provider:  11:22


Subjective/Events-last exam


Markedly better, more awake and alert, still slight confused, but improved.





Focused Exam


Lactate Level


18 10:00: Lactic Acid Level 0.64





Objective


Exam





Vital Signs








  Date Time  Temp Pulse Resp B/P (MAP) Pulse Ox O2 Delivery O2 Flow Rate FiO2


 


18 08:15 99.8 74 20 136/60 (85) 92 Room Air  


 


18 06:46      Room Air  91


 


18 05:00 98.8 75 22 111/51 (71) 91 Room Air  


 


18 00:00 99.2 69 22 112/50 (70) 90 Room Air  


 


18 21:00      Room Air  


 


18 20:18      Room Air  90


 


18 19:20 99.5 68 24 125/62 (83) 93 Room Air  


 


18 15:35 98.2 70 28 118/57 (77) 93 Room Air  


 


18 12:00 98.1 73 22 156/72 (100) 94 Room Air  














I & O 


 


 18





 07:00


 


Intake Total 2320 ml


 


Output Total 2640 ml


 


Balance -320 ml





Capillary Refill :


General Appearance:  No Apparent Distress


Respiratory:  No Accessory Muscle Use, No Respiratory Distress


Cardiovascular:  Regular Rate, Rhythm, Normal Peripheral Pulses


Gastrointestinal:  soft


Extremity:  Normal Capillary Refill, No Calf Tenderness


Neurologic/Psychiatric:  Alert


Skin:  Normal Color





Results


Lab


Laboratory Tests


18 12:00: 


White Blood Count 11.6H, Red Blood Count 2.91L, Hemoglobin 8.7L, Hematocrit 27L

, Mean Corpuscular Volume 92, Mean Corpuscular Hemoglobin 30, Mean Corpuscular 

Hemoglobin Concent 33, Red Cell Distribution Width 16.9H, Platelet Count 330, 

Mean Platelet Volume 9.4, Neutrophils (%) (Auto) 77H, Lymphocytes (%) (Auto) 11L

, Monocytes (%) (Auto) 9, Eosinophils (%) (Auto) 3, Basophils (%) (Auto) 0, 

Neutrophils # (Auto) 9.0H, Lymphocytes # (Auto) 1.2, Monocytes # (Auto) 1.0, 

Eosinophils # (Auto) 0.3, Basophils # (Auto) 0.0, Sodium Level 139, Potassium 

Level 3.7, Chloride Level 108H, Carbon Dioxide Level 21, Anion Gap 10, Blood 

Urea Nitrogen 10, Creatinine 1.24, Estimat Glomerular Filtration Rate 56, BUN/

Creatinine Ratio 8, Glucose Level 134H, Calcium Level 7.7L, Corrected Calcium 

9.1, Total Bilirubin 0.5, Aspartate Amino Transf (AST/SGOT) 47H, Alanine 

Aminotransferase (ALT/SGPT) 37, Alkaline Phosphatase 160H, Total Protein 4.4L, 

Albumin 2.2L


18 15:40: Glucometer 265H


18 20:46: Glucometer 74


18 05:10: Glucometer 89


18 05:25: 


White Blood Count 9.8, Red Blood Count 2.84L, Hemoglobin 8.5L, Hematocrit 26L, 

Mean Corpuscular Volume 92, Mean Corpuscular Hemoglobin 30, Mean Corpuscular 

Hemoglobin Concent 33, Red Cell Distribution Width 16.7H, Platelet Count 316, 

Mean Platelet Volume 9.4, Neutrophils (%) (Auto) 71, Lymphocytes (%) (Auto) 15, 

Monocytes (%) (Auto) 10, Eosinophils (%) (Auto) 4, Basophils (%) (Auto) 0, 

Neutrophils # (Auto) 7.0, Lymphocytes # (Auto) 1.5, Monocytes # (Auto) 0.9, 

Eosinophils # (Auto) 0.3, Basophils # (Auto) 0.0, Sodium Level 139, Potassium 

Level 3.3L, Chloride Level 107, Carbon Dioxide Level 22, Anion Gap 10, Blood 

Urea Nitrogen 11, Creatinine 1.28, Estimat Glomerular Filtration Rate 54, BUN/

Creatinine Ratio 9, Glucose Level 82, Calcium Level 7.8L, Corrected Calcium 9.2

, Total Bilirubin 0.5, Aspartate Amino Transf (AST/SGOT) 44H, Alanine 

Aminotransferase (ALT/SGPT) 37, Alkaline Phosphatase 146H, Total Protein 4.2L, 

Albumin 2.2L


18 10:47: Glucometer 132H





Microbiology


18 Blood Culture - Preliminary, Resulted


          No growth


18 Urine Culture - Final, Complete


          Pseudomonas aeruginosa





Assessment/Plan


Assessment/Plan


Assess & Plan/Chief Complaint


Post-Op Delirium


Lumbar Wound infection with Staph Epi


Lumbar Hardware Failure


Deconditioning


Acute Blood Loss anemia


UTI


Delirium














Very long, slow recovery, but improved from a few days ago.





Clinical Quality Measures


DVT/VTE Risk/Contraindication:


Risk Factor Score Per Nursin


RFS Level Per Nursing on Admit:  4+=Very High











ENZO ODNALDSON MD Dec 9, 2018 11:24

## 2018-12-10 VITALS — SYSTOLIC BLOOD PRESSURE: 162 MMHG | DIASTOLIC BLOOD PRESSURE: 72 MMHG

## 2018-12-10 VITALS — DIASTOLIC BLOOD PRESSURE: 86 MMHG | SYSTOLIC BLOOD PRESSURE: 155 MMHG

## 2018-12-10 VITALS — DIASTOLIC BLOOD PRESSURE: 64 MMHG | SYSTOLIC BLOOD PRESSURE: 137 MMHG

## 2018-12-10 VITALS — DIASTOLIC BLOOD PRESSURE: 72 MMHG | SYSTOLIC BLOOD PRESSURE: 163 MMHG

## 2018-12-10 VITALS — SYSTOLIC BLOOD PRESSURE: 155 MMHG | DIASTOLIC BLOOD PRESSURE: 86 MMHG

## 2018-12-10 LAB
ALBUMIN SERPL-MCNC: 2.2 GM/DL (ref 3.2–4.5)
ALP SERPL-CCNC: 156 U/L (ref 40–136)
ALT SERPL-CCNC: 39 U/L (ref 0–55)
BASOPHILS # BLD AUTO: 0 10^3/UL (ref 0–0.1)
BASOPHILS NFR BLD AUTO: 0 % (ref 0–10)
BILIRUB SERPL-MCNC: 0.5 MG/DL (ref 0.1–1)
BUN/CREAT SERPL: 9
CALCIUM SERPL-MCNC: 7.6 MG/DL (ref 8.5–10.1)
CHLORIDE SERPL-SCNC: 107 MMOL/L (ref 98–107)
CO2 SERPL-SCNC: 24 MMOL/L (ref 21–32)
CREAT SERPL-MCNC: 1.27 MG/DL (ref 0.6–1.3)
EOSINOPHIL # BLD AUTO: 0.5 10^3/UL (ref 0–0.3)
EOSINOPHIL NFR BLD AUTO: 5 % (ref 0–10)
ERYTHROCYTE [DISTWIDTH] IN BLOOD BY AUTOMATED COUNT: 17.1 % (ref 10–14.5)
GFR SERPLBLD BASED ON 1.73 SQ M-ARVRAT: 55 ML/MIN
GLUCOSE SERPL-MCNC: 97 MG/DL (ref 70–105)
HCT VFR BLD CALC: 26 % (ref 40–54)
HGB BLD-MCNC: 8.6 G/DL (ref 13.3–17.7)
LYMPHOCYTES # BLD AUTO: 1.7 X 10^3 (ref 1–4)
LYMPHOCYTES NFR BLD AUTO: 18 % (ref 12–44)
MANUAL DIFFERENTIAL PERFORMED BLD QL: NO
MCH RBC QN AUTO: 30 PG (ref 25–34)
MCHC RBC AUTO-ENTMCNC: 33 G/DL (ref 32–36)
MCV RBC AUTO: 92 FL (ref 80–99)
MONOCYTES # BLD AUTO: 1 X 10^3 (ref 0–1)
MONOCYTES NFR BLD AUTO: 11 % (ref 0–12)
NEUTROPHILS # BLD AUTO: 6.4 X 10^3 (ref 1.8–7.8)
NEUTROPHILS NFR BLD AUTO: 67 % (ref 42–75)
PLATELET # BLD: 289 10^3/UL (ref 130–400)
PMV BLD AUTO: 9.4 FL (ref 7.4–10.4)
POTASSIUM SERPL-SCNC: 3.6 MMOL/L (ref 3.6–5)
PROT SERPL-MCNC: 4.3 GM/DL (ref 6.4–8.2)
RBC # BLD AUTO: 2.84 10^6/UL (ref 4.35–5.85)
SODIUM SERPL-SCNC: 139 MMOL/L (ref 135–145)
WBC # BLD AUTO: 9.6 10^3/UL (ref 4.3–11)

## 2018-12-10 RX ADMIN — OXYBUTYNIN CHLORIDE SCH MG: 5 TABLET ORAL at 20:51

## 2018-12-10 RX ADMIN — METOPROLOL TARTRATE SCH MG: 25 TABLET, FILM COATED ORAL at 20:53

## 2018-12-10 RX ADMIN — POTASSIUM CHLORIDE SCH MEQ: 750 TABLET, FILM COATED, EXTENDED RELEASE ORAL at 08:35

## 2018-12-10 RX ADMIN — BETHANECHOL CHLORIDE SCH MG: 25 TABLET ORAL at 20:52

## 2018-12-10 RX ADMIN — ALLOPURINOL SCH MG: 300 TABLET ORAL at 08:34

## 2018-12-10 RX ADMIN — METFORMIN HYDROCHLORIDE SCH MG: 500 TABLET, FILM COATED ORAL at 05:44

## 2018-12-10 RX ADMIN — BETHANECHOL CHLORIDE SCH MG: 25 TABLET ORAL at 12:04

## 2018-12-10 RX ADMIN — AMLODIPINE BESYLATE SCH MG: 5 TABLET ORAL at 08:35

## 2018-12-10 RX ADMIN — LISINOPRIL SCH MG: 20 TABLET ORAL at 08:35

## 2018-12-10 RX ADMIN — OXYBUTYNIN CHLORIDE SCH MG: 5 TABLET ORAL at 08:35

## 2018-12-10 RX ADMIN — Medication SCH EA: at 05:45

## 2018-12-10 RX ADMIN — OXYBUTYNIN CHLORIDE SCH MG: 5 TABLET ORAL at 13:13

## 2018-12-10 RX ADMIN — SENNOSIDES SCH MG: 8.6 TABLET, FILM COATED ORAL at 20:53

## 2018-12-10 RX ADMIN — PHENAZOPYRIDINE HYDROCHLORIDE SCH MG: 100 TABLET ORAL at 08:35

## 2018-12-10 RX ADMIN — ALBUTEROL SULFATE SCH MG: 2.5 SOLUTION RESPIRATORY (INHALATION) at 16:52

## 2018-12-10 RX ADMIN — RISPERIDONE SCH MG: 0.25 TABLET, FILM COATED ORAL at 08:35

## 2018-12-10 RX ADMIN — POTASSIUM CHLORIDE SCH MEQ: 750 TABLET, FILM COATED, EXTENDED RELEASE ORAL at 20:55

## 2018-12-10 RX ADMIN — PHENAZOPYRIDINE HYDROCHLORIDE SCH MG: 100 TABLET ORAL at 13:13

## 2018-12-10 RX ADMIN — INSULIN ASPART SCH UNIT: 100 INJECTION, SOLUTION INTRAVENOUS; SUBCUTANEOUS at 12:05

## 2018-12-10 RX ADMIN — BETHANECHOL CHLORIDE SCH MG: 25 TABLET ORAL at 17:35

## 2018-12-10 RX ADMIN — ALBUTEROL SULFATE SCH MG: 2.5 SOLUTION RESPIRATORY (INHALATION) at 07:27

## 2018-12-10 RX ADMIN — LACTOBACILLUS ACIDOPHILUS / LACTOBACILLUS BULGARICUS SCH GM: 100 MILLION CFU STRENGTH GRANULES at 17:35

## 2018-12-10 RX ADMIN — INSULIN ASPART SCH UNIT: 100 INJECTION, SOLUTION INTRAVENOUS; SUBCUTANEOUS at 20:50

## 2018-12-10 RX ADMIN — SODIUM CHLORIDE SCH MLS/HR: 900 INJECTION INTRAVENOUS at 20:49

## 2018-12-10 RX ADMIN — ACETAMINOPHEN PRN MG: 325 TABLET ORAL at 05:45

## 2018-12-10 RX ADMIN — RISPERIDONE SCH MG: 0.25 TABLET, FILM COATED ORAL at 20:52

## 2018-12-10 RX ADMIN — LACTOBACILLUS ACIDOPHILUS / LACTOBACILLUS BULGARICUS SCH GM: 100 MILLION CFU STRENGTH GRANULES at 20:56

## 2018-12-10 RX ADMIN — SODIUM CHLORIDE SCH MLS/HR: 900 INJECTION INTRAVENOUS at 08:24

## 2018-12-10 RX ADMIN — LACTOBACILLUS ACIDOPHILUS / LACTOBACILLUS BULGARICUS SCH GM: 100 MILLION CFU STRENGTH GRANULES at 05:46

## 2018-12-10 RX ADMIN — SODIUM CHLORIDE SCH MLS/HR: 900 INJECTION, SOLUTION INTRAVENOUS at 03:31

## 2018-12-10 RX ADMIN — SENNOSIDES SCH MG: 8.6 TABLET, FILM COATED ORAL at 08:36

## 2018-12-10 RX ADMIN — ENOXAPARIN SODIUM SCH MG: 100 INJECTION SUBCUTANEOUS at 12:04

## 2018-12-10 RX ADMIN — SIMVASTATIN SCH MG: 10 TABLET, FILM COATED ORAL at 20:53

## 2018-12-10 RX ADMIN — SODIUM CHLORIDE SCH MLS/HR: 900 INJECTION, SOLUTION INTRAVENOUS at 13:12

## 2018-12-10 RX ADMIN — DOCUSATE SODIUM SCH MG: 100 CAPSULE ORAL at 20:52

## 2018-12-10 RX ADMIN — LACTOBACILLUS ACIDOPHILUS / LACTOBACILLUS BULGARICUS SCH GM: 100 MILLION CFU STRENGTH GRANULES at 12:04

## 2018-12-10 RX ADMIN — INSULIN ASPART SCH UNIT: 100 INJECTION, SOLUTION INTRAVENOUS; SUBCUTANEOUS at 17:27

## 2018-12-10 RX ADMIN — SODIUM CHLORIDE SCH MLS/HR: 900 INJECTION, SOLUTION INTRAVENOUS at 12:05

## 2018-12-10 RX ADMIN — AMIODARONE HYDROCHLORIDE SCH MG: 200 TABLET ORAL at 08:35

## 2018-12-10 RX ADMIN — METOPROLOL TARTRATE SCH MG: 25 TABLET, FILM COATED ORAL at 08:35

## 2018-12-10 RX ADMIN — PIOGLITAZONE SCH MG: 30 TABLET ORAL at 05:45

## 2018-12-10 RX ADMIN — DOCUSATE SODIUM SCH MG: 100 CAPSULE ORAL at 08:35

## 2018-12-10 RX ADMIN — FLUCONAZOLE IN SODIUM CHLORIDE SCH MLS/HR: 2 INJECTION, SOLUTION INTRAVENOUS at 10:04

## 2018-12-10 RX ADMIN — PHENAZOPYRIDINE HYDROCHLORIDE SCH MG: 100 TABLET ORAL at 17:35

## 2018-12-10 RX ADMIN — METFORMIN HYDROCHLORIDE SCH MG: 500 TABLET, FILM COATED ORAL at 17:35

## 2018-12-10 RX ADMIN — TAMSULOSIN HYDROCHLORIDE SCH MG: 0.4 CAPSULE ORAL at 17:35

## 2018-12-10 RX ADMIN — FAMOTIDINE SCH MG: 20 TABLET, FILM COATED ORAL at 08:35

## 2018-12-10 RX ADMIN — INSULIN ASPART SCH UNIT: 100 INJECTION, SOLUTION INTRAVENOUS; SUBCUTANEOUS at 05:46

## 2018-12-10 RX ADMIN — ALBUTEROL SULFATE SCH MG: 2.5 SOLUTION RESPIRATORY (INHALATION) at 18:52

## 2018-12-10 RX ADMIN — BETHANECHOL CHLORIDE SCH MG: 25 TABLET ORAL at 05:44

## 2018-12-10 NOTE — PHYSICAL THERAPY DAILY NOTE
PT Daily Note-Current


Subjective


Patient is very confused and states he wants to walk.  PT had difficulty 

reasoning with patient on  patient's inability to perform this activity.  

Finally agrees to back to bed.





Pain





   Numeric Pain Scale:  5-Moderate Pain


   Location:  Medial, Lower


   Location Body Site:  Back


   Pain Description:  Acute





Mental Status


Patient Orientation:  Confused, Mumbles


Attachments:  Luis Catheter, IV


wound vac





Transfers


Therapy Code Descriptions/Definitions 





Functional [place] Measure:


0=Not Assessed/NA        4=Minimal Assistance


1=Total Assistance        5=Supervision or Setup


2=Maximal Assistance  6=Modified [place]


3=Moderate Assistance 7=Complete [place]








Therapy Quality Codes:


6    Independent with activity with or without an assistive device


5    Patient requires set up or clean up by helper.  Patient completes activity

  by  themselves


4    Supervision or touching assist (CGA). [place] provide cues , steadying 

assist


3    The helper provides less than half the effort to complete the activity


2    The helper provides more than half the effort to complete the activity


1    Dependent.  The helper does all the effort to complete an activity 


7    Patient refused to complete or attempt activity


9    The patient did not perform the activity before the current illness or 

injury


88  Not attempted due to Medical conditions or safety concerns


Transfers (B, C, W/C) (FIM):  2


Scootin


Rollin


Roll Left to Right (QC):  2


Supine to/from Sit:  2


Sit to/from Stand:  2


Sit to Lying (QC):  2


Sit to Stand (QC):  2


Chair/Bed-to-Chair Xfer(QC):  2


Bed to/from Chair:  2


max assist with all sit to stand and bed mobility with use of gait belt.  

Patient repositioned to side lying right with pillow placement for comfort.





Weight Bearing


Right Lower Extremity:  Right


Full Weight Bearing


Left Lower Extremity:  Left


Full Weight Bearing





Assessment


Due to confusion, patient requires redirection to remain on task  He has 

difficulty with simple direction.  Due to multiple hardware failures, transfers 

to be performed with use of gait belt up high on torso with assist of 2 with 

SPT.  DO NOT USE SIT TO STAND LIFT.  This could compromise the thoracic/lumbar 

hardware and wound vac and overall healing process.  Nursing notified.





PT Long Term Goals


Long Term Goals


PT Long Term Goals Time Frame:  Dec 29, 2018


Transfers (B,C,W/C) (FIM):  4


Sit to Lying (QC):  4


Lying-Sitting on Side/Bed(QC):  4


Sit to Stand (QC):  4


Rollin


Roll Left to Right (QC):  4


Chair/Bed-to-Chair Xfer(QC):  4


Car Transfer (QC):  4


Does the Patient Walk:  No and Walking Goal IS indicated


Gait (FIM):  1


Gait distance (FIM):  1=up to 49 ft


Distance:  15'


Walk 10 feet (QC):  4


Walk 10ft-Uneven Surface(QC):  4


Walk 50ft with 2 Turns (QC):  88


Walk 150 ft (QC):  88


Gait Level of Assist:  4


Gait Assistive Device:  FWW





PT Plan


Treatment/Plan


Treatment Plan:  Continue Plan of Care


Treatment Plan:  Bed Mobility, Education, Functional Activity [place], Functional 

Strength, Gait, Safety, Therapeutic Exercise, Transfers


Treatment Duration:  Dec 29, 2018


Frequency:  6-11/wk as patient medical status improves


Estimated Hrs Per Day:  .5 hour per day


Patient and/or Family Agrees t:  Yes





Time/GCodes


Time In:  1245


Time Out:  1318


Total Billed Treatment Time:  23


Total Billed Treatment


1 visit


FA x 2 23 min











DANA CROOKS PT Dec 10, 2018 13:31

## 2018-12-10 NOTE — OCCUPATIONAL THERAPY EVAL
OT Evaluation-General/PLF


Medical Diagnosis


Admission Date


Dec 10, 2018 at 10:12


Medical Diagnosis:  UTI/back surgery/hardware failure


Onset Date:  2018





Therapy Diagnosis


Therapy Diagnosis:  decr self care, weakness, decr funct mob, decr act jj, 

confusion





Height/Weight


Height (Feet):  5


Height (Inches):  4.00


Weight (Pounds):  229


Weight (Ounces):  1.0





Precautions


Precautions/Isolations:  Fall Prevention, Standard Precautions





Referral


Physician:  Inocencia


Referral Reason:  Evaluation/Treatment





Medical History


Pertinent Medical History:  Atrial Fib, CABG, CAD, DM, HTN


Additional Medical History


Chronic back pain. BPH. Cataract surgery and 2 other surgeries L eye.


Current History


Has had 4 back surgeries since , most recent 18. Includes stay on IRF 

but discharged to acute care for more surgery for hardware failure.


Reviewed History:  Yes





Social History


Home:  Single Level


Current Living Status:  Spouse


Entry Into Home:  Stairs With Railing


 Steps Into Home:  3





ADL-Prior Level of Function


Therapy Code Descriptions/Definitions 





Functional [place] Measure:


0=Not Assessed/NA        4=Minimal Assistance


1=Total Assistance        5=Supervision or Setup


2=Maximal Assistance  6=Modified [place]


3=Moderate Assistance 7=Complete [place]








Therapy Quality Codes:


6    Independent with activity with or without an assistive device


5    Patient requires set up or clean up by helper.  Patient completes activity

  by  themselves


4    Supervision or touching assist (CGA). [place] provide cues , steadying 

assist


3    The helper provides less than half the effort to complete the activity


2    The helper provides more than half the effort to complete the activity


1    Dependent.  The helper does all the effort to complete an activity 


7    Patient refused to complete or attempt activity


9    The patient did not perform the activity before the current illness or 

injury


88  Not attempted due to Medical conditions or safety concerns





Functional Abilities and Goals:


Independent: Patient completed the activities by him/herself, with or without 

an assistive device, with no assistance from a helper.


Needed Some Help: Patient needed partial assistance from another person to 

complete activities.


Dependent: A helper completed the activities for the patient. 


Unknown:


Not Applicable:


ADL PLOF Comments


Prior to back surgery in , pt was able to manage his basic ADLs with no 

help. He also managed his medications and check book. He is a retired 

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


Self Care:  Independent


Functional Cognition:  Independent


DME/Equipment:  Bath Chair, Shower, Tall Toilet





OT Current Status


Subjective


Pt seen in room, up ini bed, agreeable to OT. Pain reported 4/10 when he wasn't 

moving (like when in bed) but said it could get up to 7/10.





Appearance


Initially alert but became more confused and agitated.





Mental Status/Objective


Patient Orientation:  Person, Place, Time


Attachments:  Central Line, Luis Catheter, IV, Telemetry, Other-See Comments (

wound vac)





Current


Glasses/Contacts:  Yes


Hearing Aids:  Yes


Dentures/Partials:  No


Hand Dominance:  Right


Upper Extremity ROM


Grossly WFL except shoulder approx 80-90 degrees flex/abd


Upper Extremity Coordination


Fair


Upper Extremity Strength


Grossly 4/5 bilat (except at shoulders)





ADL-Treatment


ADL-Current


Pt has had delirium post op and been quite confused. Wife reported that he has 

been able to feed himself part of a hotdog today but he needs some assistance (

nursing reported that he is a feeder). Refused to brush teeth because he wasn't 

done eating, did wash face but not hands. Dependant with dressing. Dependant 

toileting, with Luis and bedpan. Transfers max assist per PT eval. Dependant 

bathing.


Eating (FIM):  3 (mod assist)


Eating (QC):  3 (mod assist)


Grooming (FIM):  3 (Washed face with washcloth but refused to wash hands. )


Oral Hygiene (QC):  7 (Refused)


Bathing (FIM):  1


Shower/Bathe Self (QC):  1


Upper Body Dressing (FIM):  1


Upper Body Dressing (QC):  1


Lower Body Dressing (FIM):  1


Lower Body Dressing (QC):  1


On/Off Footwear (QC):  1


Toileting (FIM):  1 (Unable to manage clothing or hygiene. Luis, bedpan)


Toileting Hygiene (QC):  1


Toilet/Commode Transfer (FIM):  0


Toilet Transfer (QC):  88


Shower Transfer (FIM):  0





Education


OT Patient Education:  Purpose of tx/functional activities, Rehab process


Teaching Recipient:  Patient, Family


Teaching Methods:  Discussion


Response to Teaching:  Reinforcement Needed





OT Short Term Goals


Short Term Goals


Time Frame:  Dec 17, 2018


Eating(FIM):  5


Grooming(FIM):  4


Bathing(FIM):  3


Upper Body Dressing(FIM):  4


Lower Body Dressing(FIM):  3


Toileting(FIM):  3


Toilet/Commode Transfer(FIM):  3


Additional Short Term Goals:  1-Demonstrate ADL Tasks, 2-Verbalize Understanding

, 3-ImproveStrength/Sam[place]


1=Demonstrate adherence to instructed precautions during ADL tasks.


2=Patient will verbalize/demonstrate understanding of assistive devices/

modifications for ADL.


3=Patient will improve strength/tolerance for activity to enable patient to 

perform ADL's.





OT Long Term Goals


Long Term Goals


Time Frame:  Dec 29, 2018


Eating (FIM):  6


Eating (QC):  6


Groomin


Oral Hygiene (QC):  5


Bathing(FIM):  5


Shower/Bathe Self (QC):  4


Upper Body Dressing(FIM):  5


Upper Body Dressing (QC):  4


Lower Body Dressing(FIM):  5


Lower Body Dressing (QC):  4


On/Off Footwear (QC):  5


Toileting(FIM):  5


Toileting Hygiene (QC):  4


Toilet/Commode Transfer(FIM):  5


Toilet/Commode Transfer (QC):  4


Shower Transfer(FIM):  4


Additional Goals:  1-Demonstrate ADL Tasks, 2-Verbalize Understanding, 3-

ImproveStrength/Samina


1=Demonstrate adherence to instructed precautions during ADL tasks.


2=Patient will verbalize/demonstrate understanding of assistive devices/

modifications for ADL.


3=Patient will improve strength/tolerance for activity to enable patient to 

perform ADL's.





OT Education/Plan


Problem List/Assessment


Assessment:  Decreased Activ Tolerance, Decreased UE Strength, Dependent 

Transfers, Impaired Bed Mobility, Impaired Cognition, Impaired Funct Balance, 

Impaired Self-Care Skills, Restricted Funct UE ROM


Pt would benefit from skilled OT to increase his independence in basic self care





Discharge Recommendations


Plan/Recommendations:  Continue POC





Treatment Plan/Plan of Care


Treatment,Training & Education:  Yes


Patient would benefit from OT for education, treatment and training to promote 

independence in ADL's, mobility, safety and/or upper extremity function for ADL'

s.


Plan of Care:  ADL Retraining, Functional Mobility, UE Funct Exercise/Act, UE 

Neuromus Re-Ed/Coord


Treatment Duration:  Dec 29, 2018


Frequency:  5 times per week


Estimated Hrs Per Day:  .5 hour per day


Agreement:  Yes


Rehab Potential:  Guarded





Time/GCodes


Start Time:  14:08


Stop Time:  14:24


Total Time Billed (hr/min):  16


Billed Treatment Time


visit, 16 minutes evaluation high intensity











FERNANDO SMITH OT Dec 10, 2018 15:02

## 2018-12-10 NOTE — DISCHARGE SUMMARY-HOSPITALIST
LISA QUIÑONES DO 12/10/18 0921:


Diagnosis/Chief Complaint


Date of Admission


Dec 2, 2018 at 16:01


Date of Discharge





Discharge Diagnosis





(1) Pseudomonas urinary tract infection


Status:  Acute


(2) Delirium


Status:  Acute


(3) Yeast UTI


Status:  Acute


(4) Loosening of hardware in spine


Status:  Resolved


(5) Intraspinal abscess and granuloma


Status:  Resolved


(6) Urinary retention


Status:  Acute


(7) Transfusion of blood during current hospitalization


Status:  Acute


(8) Advanced age


Status:  Chronic


(9) C. difficile colitis


Status:  Resolved


(10) Renal insufficiency


Status:  Resolved


(11) Leukocytosis


Status:  Resolved


(12) CAD (coronary artery disease)


Status:  Chronic


(13) Atrial fibrillation


Status:  Chronic


(14) Hypokalemia


Status:  Acute


(15) Edema


Status:  Acute


(16) In guarded condition


Status:  Acute





Discharge Summary


Discharge Physical Exam


Allergies:  


Coded Allergies:  


     cephalexin (Verified  Allergy, Unknown, 18)


     hydrocodone (Verified  Allergy, Unknown, 18)


Vitals & I&Os





Vital Signs








  Date Time  Temp Pulse Resp B/P (MAP) Pulse Ox O2 Delivery O2 Flow Rate FiO2


 


12/10/18 07:28      Room Air  90


 


12/10/18 04:00 100.4 77 16 162/72 (102) 93   


 


18 07:35       1.00 








General Appearance:  No Apparent Distress, WD/WN, Chronically ill, Obese


Respiratory:  Chest Non Tender, Lungs Clear, Normal Breath Sounds, No Accessory 

Muscle Use, No Respiratory Distress


Cardiovascular:  Regular Rate, Rhythm, No Edema, No Gallop, No JVD, No Murmur, 

Normal Peripheral Pulses


Neurologic/Psychiatric:  Alert, No Motor/Sensory Deficits, Normal Mood/Affect, 

Disoriented





Hospital Course


Reviewed hospital course note below and agree with assessment, plan and course 

details.


Labs (last 24 hrs)





Microbiology


18 Blood Culture - Preliminary, Resulted


          No growth


18 Urine Culture - Final, Complete


          Pseudomonas aeruginosa


Patient resulted labs reviewed.


Pending Labs








Discussion & Recommendations


Discharge Planning:  <30 minutes discharge planning





Discharge


Home Medications:





Active Scripts


Active


Novolog (Insulin Aspart) 100 Unit/1 Ml Susp 0 Unit SC ACHS 30 Days


Floranex Granules Packet (L. Acidophilus/Bulgaricus) 1 Each Gran.pack 1 Gm PO 

ACHS 30 Days


Phenazopyridine HCl 100 Mg Tablet 100 Mg PO TIDPC 30 Days


Tramadol HCl 50 Mg Tablet  Mg PO Q4H PRN 10 Days


Fentanyl Patch 25 MCG (Fentanyl) 1 Each Patch.td72 25 Mcg TD Q72H 10 Days


Amlodipine Besylate 5 Mg Tablet 5 Mg PO DAILY 30 Days


Metoprolol Tartrate 25 Mg Tablet 25 Mg PO BID 30 Days


Flomax (Tamsulosin HCl) 0.4 Mg Cap 0.4 Mg PO DAILY@1800 30 Days


Bethanechol Chloride 10 Mg Tablet 10 Mg PO ACHS 30 Days


Reported


Amiodarone HCl 200 Mg Tablet 200 Mg PO DAILY


Buprenorphine 1 Each Patch.tdwk 1 Patch TD WEEK


Metformin HCl 500 Mg Tablet 500 Mg PO BID


Pioglitazone HCl 45 Mg Tablet 45 Mg PO DAILY


Lovastatin 20 Mg Tablet 20 Mg PO DAILY


Allopurinol 300 Mg Tablet 300 Mg PO DAILY


Lisinopril 20 Mg Tablet 20 Mg PO DAILY


Oxybutynin Chloride 5 Mg Tablet 5 Mg PO TID





Instructions to patient/family


Please see electronic discharge instructions given to patient.





Clinical Quality Measures


DVT/VTE Risk/Contraindication:


Risk Factor Score Per Nursin


RFS Level Per Nursing on Admit:  4+=Very High





MACHO QUINTERO MED STUDENT 12/10/18 1052:


Discharge Summary


Discharge Physical Exam


Allergies:  


Coded Allergies:  


     cephalexin (Verified  Allergy, Unknown, 18)


     hydrocodone (Verified  Allergy, Unknown, 18)


General Appearance:  No Apparent Distress, WD/WN


Respiratory:  Chest Non Tender, Lungs Clear, Normal Breath Sounds, No Accessory 

Muscle Use, No Respiratory Distress


Cardiovascular:  Regular Rate, Rhythm, No Edema, No Gallop, No JVD, No Murmur


Gastrointestinal:  Normal Bowel Sounds


Skin:  Normal Color, Warm/Dry


Neurologic/Psychiatric:  Alert, No Motor/Sensory Deficits, Disoriented





Hospital Course


Assessment: 


1) Acute delirium


2) Pseudomonas UTI


3) Debility due to repeat surgeries


4) Intraspinal abscess


5) Urinary retention


6) Renal insufficiency 


7) C. diff colitis (resolved)


8) Diabetes





Plan:


1) Continue antibiotics for UTI


2) Ambulate to chair


3) 3 more weeks of vancomycin


4) Admit to swingbed


5) Physical rehabilitation consult


6) Wound vac change M/W/F


7) Possible inpatient rehab by end of week





Hospital Course:





The patient is an 77 y/o male who was admitted to ICU on  for monitoring 

after surgery for failed spinal hardware. He was found to have a BP of 115 and 

was started on IV fluids. It was also observed that the patient was having 

decreased urinary output. Patient reported that he was feeling well and his 

pain was well controlled. On  the patient states that he was doing well but 

required 2 more units of blood. Wife reported that he was not eating well. 

Patients catheter was still in place. On  the patient reported reduce pain 

and his hgb had improved. On 12/3 the nursing staff reported that the patient 

began having delirium at night. On  the patient became agitated and started 

hallucinating. The patient was started on Risperdal and Fentanyl was 

discontinued. On  the nursing staff and wife reported that the delirium had 

slightly improved and he was not in pain. On  the patient became aggressive 

with the nursing staff and was administered a dose of Haldol. The wife reported 

that the patient was becoming more confused. On  antibiotic therapy for a 

Pseudomonas UTI was initiated. His delirium was worsened and his WBC count was 

elevated at 16.0. On  his delirium had much improved and the patient was 

able to answer questions. On  the patient was much more lucid and was 

having conversations with his family. This morning he is slightly confused but 

able to answer questions. He does not complain of pain and he will be admitted 

to swing-bed for close observation.





Problem Qualifiers





(1) Leukocytosis:  


Leukocytosis type:  leukemoid reaction  Qualified Codes:  D72.823 - Leukemoid 

reaction


(2) CAD (coronary artery disease):  


Coronary Disease-Associated Artery/Lesion type:  native artery  Native vs. 

transplanted heart:  native heart  Associated angina:  without angina  

Qualified Codes:  I25.10 - Atherosclerotic heart disease of native coronary 

artery without angina pectoris


(3) Atrial fibrillation:  


Atrial fibrillation type:  unspecified  Qualified Codes:  I48.91 - Unspecified 

atrial fibrillation


(4) Edema:  


Edema type:  localized  Qualified Codes:  R60.0 - Localized edema








LISA QUIÑONES DO Dec 10, 2018 09:21


MACHO QUINTERO MED STUDENT Dec 10, 2018 10:52

## 2018-12-10 NOTE — PROGRESS NOTE (SOAP)
Subjective


Date Seen by a Provider:  Dec 10, 2018


Time Seen by a Provider:  07:03


Subjective/Events-last exam


Patient resting comfortably at this time, increase confusion and agitation 

during the night


VSS


Review of Systems


Pulmonary:  No Cough


Gastrointestinal:  No: Abdominal Pain


Musculoskeletal:  No: back pain, leg pain


Neurological:  Confusion





Focused Exam


Lactate Level


18 10:00: Lactic Acid Level 0.64





Objective


Exam





Vital Signs








  Date Time  Temp Pulse Resp B/P (MAP) Pulse Ox O2 Delivery O2 Flow Rate FiO2


 


12/10/18 04:00 100.4 77 16 162/72 (102) 93 Room Air  


 


12/10/18 00:00 99.6 65 20 137/64 (88) 94 Room Air  


 


18 21:00      Room Air  


 


18 20:19      Room Air  91


 


18 19:35 100.2 75 24 142/61 (88) 90 Room Air  


 


18 15:55 98.7 70 24 139/65 (89) 93 Room Air  


 


18 12:28 99.3 71 18 102/56 (71) 92 Room Air  


 


18 09:00      Room Air  


 


18 08:15 99.8 74 20 136/60 (85) 92 Room Air  














I & O 


 


 12/10/18





 07:00


 


Intake Total 1360 ml


 


Output Total 1230 ml


 


Balance 130 ml





Capillary Refill :


General Appearance:  No Apparent Distress


Gastrointestinal:  soft


Neurologic/Psychiatric:  No Motor/Sensory Deficits, Normal Mood/Affect, CNs II-

XII Norm as Tested


Skin:  Other (Wound vac in place)





Results


Lab


Laboratory Tests


18 10:47: Glucometer 132H


18 12:30: 


18 16:00: Glucometer 128H


18 20:44: Glucometer 231H


12/10/18 05:23: Glucometer 98





Microbiology


18 Blood Culture - Preliminary, Resulted


          No growth


18 Urine Culture - Final, Complete


          Pseudomonas aeruginosa





Assessment/Plan


Assessment/Plan


Assess & Plan/Chief Complaint


Lumbar wound abscess 


S/P I&D with hardware re-instrumentation


Acute Delirium


DM 2


UTI





Wound Vac change today. We will return during vac change for inspection of wound





Clinical Quality Measures


DVT/VTE Risk/Contraindication:


Risk Factor Score Per Nursin


RFS Level Per Nursing on Admit:  4+=Very High











ANGELA DONG Dec 10, 2018 07:05

## 2018-12-10 NOTE — PHYSICAL THERAPY EVALUATION
PT Evaluation-General


Medical Diagnosis


Admission Date


Dec 10, 2018 at 10:12


Medical Diagnosis:  UTI/back surgery/hardware failure


Onset Date:  2018





Therapy Diagnosis


Therapy Diagnosis:  generalized weakness/debility





Height/Weight


Height (Feet):  5


Height (Inches):  4.00


Weight (Pounds):  229


Weight (Ounces):  1.0





Precautions


Precautions/Isolations:  Fall Prevention, Standard Precautions





Weight Bear Status


Right Lower Extremity:  Right


Full Weight Bearing


Left Lower Extremity:  Left


Full Weight Bearing





Referral


Physician:  Inocencia


Reason for Referral:  Evaluation/Treatment





Medical History


Pertinent Medical History:  Atrial Fib, CABG, CAD, DM, HTN


Additional Medical History


4-5 major back surgeries in 6-8 weeks secondary to infection and hardware 

failure


Current History


SWB status secondary to UTI


Reviewed History:  Yes





Social History


Home:  Single Level


Current Living Status:  Spouse





Prior/Core FIM


Prior Level of Function


Therapy Code Descriptions/Definitions 





Functional [place] Measure:


0=Not Assessed/NA        4=Minimal Assistance


1=Total Assistance        5=Supervision or Setup


2=Maximal Assistance  6=Modified [place]


3=Moderate Assistance 7=Complete [place]








Therapy Quality Codes:


6    Independent with activity with or without an assistive device


5    Patient requires set up or clean up by helper.  Patient completes activity

  by  themselves


4    Supervision or touching assist (CGA). [place] provide cues , steadying 

assist


3    The helper provides less than half the effort to complete the activity


2    The helper provides more than half the effort to complete the activity


1    Dependent.  The helper does all the effort to complete an activity 


7    Patient refused to complete or attempt activity


9    The patient did not perform the activity before the current illness or 

injury


88  Not attempted due to Medical conditions or safety concerns





Functional Abilities and Goals:


Independent: Patient completed the activities by him/herself, with or without 

an assistive device, with no assistance from a helper.


Needed Some Help: Patient needed partial assistance from another person to 

complete activities.


Dependent: A helper completed the activities for the patient. 


Unknown:


Not Applicable:


Bed Mobility:  3


Transfers (B,C,W/C) (FIM):  3


Gait:  1


Indoor Mobility (Ambulation):  Needed Some Help


Stairs:  Needed Some Help


Prior Devices Use:  Walker


Prior Device Use:  FWW/w/c


has been in hospital or care facility x 2-3 months





PT Evaluation-Current


Subjective


Patient is more alert on this date.  Spouse present.  Patient is confused and 

mumbles.





Pain





   Numeric Pain Scale:  0-No Pain


   Location:  No Pain Reported





Objective


Patient Orientation:  Confused, Mumbles


Problem Solving:  Poor


Attachments:  Luis Catheter, IV





ROM/Strength


ROM Lower Extremities


bilateral LE WFL


Strenght Lower Extremities


right LE 3+/5 grossly/left LE 3-/5 grossly (unable to formally test due to 

patient's confusion and inability to follow simple direction)





Integumentary/Posture


Integumentary


wound vac thoracic to pelvis


Bowel Incontinence:  Yes


Bladder Incontinence:  Luis Cath


Posture


trunk flexed posture





Neuromuscular


(Tone, Coordination, Reflexes)


noted "twitching" of total body/severely diminished coordination





Sensory


Vision:  Functional


Hearing:  Hearing Aid/Aides


Sensation Right Lower Extremit:  Impaired


Sensation Left Lower Extremity:  Impaired





Transfers


Therapy Code Descriptions/Definitions 





Functional [place] Measure:


0=Not Assessed/NA        4=Minimal Assistance


1=Total Assistance        5=Supervision or Setup


2=Maximal Assistance  6=Modified [place]


3=Moderate Assistance 7=Complete [place]








Therapy Quality Codes:


6    Independent with activity with or without an assistive device


5    Patient requires set up or clean up by helper.  Patient completes activity

  by  themselves


4    Supervision or touching assist (CGA). [place] provide cues , steadying 

assist


3    The helper provides less than half the effort to complete the activity


2    The helper provides more than half the effort to complete the activity


1    Dependent.  The helper does all the effort to complete an activity 


7    Patient refused to complete or attempt activity


9    The patient did not perform the activity before the current illness or 

injury


88  Not attempted due to Medical conditions or safety concerns


Transfers (B, C, W/C) (FIM):  2


Scooting:  3


Rolling:  3


Roll Left to Right (QC):  3


Supine to/from Sit:  3


Sit to/from Stand:  2


Sit to Lying (QC):  3


Lying to Sitting/Side of Bed(Q:  3


Sit to Stand (QC):  2


Chair/Bed-to-Chair Xfer(QC):  2


max assist with sit to stand.  improved bed mobility on this date





Gait


Does the Patient Walk?:  No and Walking Goal IS indicated


Mode of Locomotion:  Both


Anticipated Mode of Locomotion:  Both





Wheelchair Training


Does the Pt Use a Wheelchair?:  No





Balance


Sitting Static:  Fair


Sitting Dynamic:  Fair


Standing Static:  Poor


 Standing Dynamic:  Poor





Treatment


bilateral LE exercises AAROM 15 reps each AP, LAQ, hip flexion/sit to stand 

transfers x 4 sets maximum assist with patient standing x 20 sec each.





Assessment/Needs


78 y.o. male, has been seen for several weeks to address functional strength 

and mobility.  Patient is currently limited due to UTI and is receiving IV 

antibiotics.  He is more alert and able to tolerate minimal activity with 

therapy.  PT to address functional strength and mobility to improve current LOF.


Rehab Potential:  Guarded





PT Long Term Goals


Long Term Goals


PT Long Term Goals Time Frame:  Dec 29, 2018


Transfers (B,C,W/C) (FIM):  4


Sit to Lying (QC):  4


Lying-Sitting on Side/Bed(QC):  4


Sit to Stand (QC):  4


Rollin


Roll Left to Right (QC):  4


Chair/Bed-to-Chair Xfer(QC):  4


Car Transfer (QC):  4


Does the Patient Walk:  No and Walking Goal IS indicated


Gait (FIM):  1


Gait distance (FIM):  1=up to 49 ft


Distance:  15'


Walk 10 feet (QC):  4


Walk 10ft-Uneven Surface(QC):  4


Walk 50ft with 2 Turns (QC):  88


Walk 150 ft (QC):  88


Gait Level of Assist:  4


Gait Assistive Device:  FWW





PT Plan


Problem List


Problem List:  Activity Tolerance, Functional Strength, Safety, Balance, Gait, 

Transfer





Treatment/Plan


Treatment Plan:  Continue Plan of Care


Treatment Plan:  Bed Mobility, Education, Functional Activity Samina, Functional 

Strength, Gait, Safety, Therapeutic Exercise, Transfers


Treatment Duration:  Dec 29, 2018


Frequency:  6-11/wk as patient medical status improves


Estimated Hrs Per Day:  .5 hour per day


Patient and/or Family Agrees t:  Yes





Discharge Recommendations


Therapy D/C Recommendations:  Home w/ Family Support, Nursing Home Placement, 

Skilled Nursing (TCU/NH)





Time/GCodes


Time In:  1040


Time Out:  1103


Total Billed Treatment Time:  23


Total Billed Treatment


1 visit


EVModC 8 min


FA 15 min











DANA CROOKS PT Dec 10, 2018 11:37

## 2018-12-11 VITALS — SYSTOLIC BLOOD PRESSURE: 108 MMHG | DIASTOLIC BLOOD PRESSURE: 58 MMHG

## 2018-12-11 VITALS — SYSTOLIC BLOOD PRESSURE: 114 MMHG | DIASTOLIC BLOOD PRESSURE: 54 MMHG

## 2018-12-11 VITALS — DIASTOLIC BLOOD PRESSURE: 70 MMHG | SYSTOLIC BLOOD PRESSURE: 111 MMHG

## 2018-12-11 VITALS — SYSTOLIC BLOOD PRESSURE: 133 MMHG | DIASTOLIC BLOOD PRESSURE: 76 MMHG

## 2018-12-11 VITALS — DIASTOLIC BLOOD PRESSURE: 57 MMHG | SYSTOLIC BLOOD PRESSURE: 144 MMHG

## 2018-12-11 VITALS — DIASTOLIC BLOOD PRESSURE: 64 MMHG | SYSTOLIC BLOOD PRESSURE: 142 MMHG

## 2018-12-11 LAB
ALBUMIN SERPL-MCNC: 2.5 GM/DL (ref 3.2–4.5)
ALP SERPL-CCNC: 186 U/L (ref 40–136)
ALT SERPL-CCNC: 45 U/L (ref 0–55)
BASOPHILS # BLD AUTO: 0.1 10^3/UL (ref 0–0.1)
BASOPHILS NFR BLD AUTO: 1 % (ref 0–10)
BILIRUB SERPL-MCNC: 0.5 MG/DL (ref 0.1–1)
BUN/CREAT SERPL: 9
CALCIUM SERPL-MCNC: 8 MG/DL (ref 8.5–10.1)
CHLORIDE SERPL-SCNC: 107 MMOL/L (ref 98–107)
CO2 SERPL-SCNC: 23 MMOL/L (ref 21–32)
CREAT SERPL-MCNC: 1.26 MG/DL (ref 0.6–1.3)
EOSINOPHIL # BLD AUTO: 0.5 10^3/UL (ref 0–0.3)
EOSINOPHIL NFR BLD AUTO: 5 % (ref 0–10)
ERYTHROCYTE [DISTWIDTH] IN BLOOD BY AUTOMATED COUNT: 16.8 % (ref 10–14.5)
GFR SERPLBLD BASED ON 1.73 SQ M-ARVRAT: 55 ML/MIN
GLUCOSE SERPL-MCNC: 131 MG/DL (ref 70–105)
HCT VFR BLD CALC: 28 % (ref 40–54)
HGB BLD-MCNC: 8.9 G/DL (ref 13.3–17.7)
LYMPHOCYTES # BLD AUTO: 1.9 X 10^3 (ref 1–4)
LYMPHOCYTES NFR BLD AUTO: 18 % (ref 12–44)
MANUAL DIFFERENTIAL PERFORMED BLD QL: NO
MCH RBC QN AUTO: 29 PG (ref 25–34)
MCHC RBC AUTO-ENTMCNC: 32 G/DL (ref 32–36)
MCV RBC AUTO: 92 FL (ref 80–99)
MONOCYTES # BLD AUTO: 1.1 X 10^3 (ref 0–1)
MONOCYTES NFR BLD AUTO: 11 % (ref 0–12)
NEUTROPHILS # BLD AUTO: 7 X 10^3 (ref 1.8–7.8)
NEUTROPHILS NFR BLD AUTO: 66 % (ref 42–75)
PLATELET # BLD: 280 10^3/UL (ref 130–400)
PMV BLD AUTO: 9.7 FL (ref 7.4–10.4)
POTASSIUM SERPL-SCNC: 3.8 MMOL/L (ref 3.6–5)
PROT SERPL-MCNC: 4.6 GM/DL (ref 6.4–8.2)
RBC # BLD AUTO: 3.04 10^6/UL (ref 4.35–5.85)
SODIUM SERPL-SCNC: 138 MMOL/L (ref 135–145)
WBC # BLD AUTO: 10.5 10^3/UL (ref 4.3–11)

## 2018-12-11 RX ADMIN — PHENAZOPYRIDINE HYDROCHLORIDE SCH MG: 100 TABLET ORAL at 09:00

## 2018-12-11 RX ADMIN — INSULIN ASPART SCH UNIT: 100 INJECTION, SOLUTION INTRAVENOUS; SUBCUTANEOUS at 05:19

## 2018-12-11 RX ADMIN — LACTOBACILLUS ACIDOPHILUS / LACTOBACILLUS BULGARICUS SCH GM: 100 MILLION CFU STRENGTH GRANULES at 12:25

## 2018-12-11 RX ADMIN — BETHANECHOL CHLORIDE SCH MG: 25 TABLET ORAL at 17:31

## 2018-12-11 RX ADMIN — SODIUM CHLORIDE SCH MLS/HR: 900 INJECTION INTRAVENOUS at 21:22

## 2018-12-11 RX ADMIN — METOPROLOL TARTRATE SCH MG: 25 TABLET, FILM COATED ORAL at 21:24

## 2018-12-11 RX ADMIN — SIMVASTATIN SCH MG: 10 TABLET, FILM COATED ORAL at 21:24

## 2018-12-11 RX ADMIN — Medication SCH EA: at 06:45

## 2018-12-11 RX ADMIN — RISPERIDONE SCH MG: 0.25 TABLET, FILM COATED ORAL at 21:23

## 2018-12-11 RX ADMIN — POTASSIUM CHLORIDE SCH MEQ: 750 TABLET, FILM COATED, EXTENDED RELEASE ORAL at 21:23

## 2018-12-11 RX ADMIN — METOPROLOL TARTRATE SCH MG: 25 TABLET, FILM COATED ORAL at 09:02

## 2018-12-11 RX ADMIN — ALBUTEROL SULFATE SCH MG: 2.5 SOLUTION RESPIRATORY (INHALATION) at 07:20

## 2018-12-11 RX ADMIN — AMLODIPINE BESYLATE SCH MG: 5 TABLET ORAL at 09:01

## 2018-12-11 RX ADMIN — PIOGLITAZONE SCH MG: 30 TABLET ORAL at 06:45

## 2018-12-11 RX ADMIN — FAMOTIDINE SCH MG: 20 TABLET, FILM COATED ORAL at 09:01

## 2018-12-11 RX ADMIN — FLUCONAZOLE IN SODIUM CHLORIDE SCH MLS/HR: 2 INJECTION, SOLUTION INTRAVENOUS at 09:59

## 2018-12-11 RX ADMIN — LACTOBACILLUS ACIDOPHILUS / LACTOBACILLUS BULGARICUS SCH GM: 100 MILLION CFU STRENGTH GRANULES at 21:23

## 2018-12-11 RX ADMIN — ALBUTEROL SULFATE SCH MG: 2.5 SOLUTION RESPIRATORY (INHALATION) at 15:25

## 2018-12-11 RX ADMIN — OXYBUTYNIN CHLORIDE SCH MG: 5 TABLET ORAL at 13:23

## 2018-12-11 RX ADMIN — INSULIN ASPART SCH UNIT: 100 INJECTION, SOLUTION INTRAVENOUS; SUBCUTANEOUS at 17:31

## 2018-12-11 RX ADMIN — ENOXAPARIN SODIUM SCH MG: 100 INJECTION SUBCUTANEOUS at 09:03

## 2018-12-11 RX ADMIN — TAMSULOSIN HYDROCHLORIDE SCH MG: 0.4 CAPSULE ORAL at 17:30

## 2018-12-11 RX ADMIN — SODIUM CHLORIDE SCH MLS/HR: 900 INJECTION, SOLUTION INTRAVENOUS at 00:47

## 2018-12-11 RX ADMIN — SODIUM CHLORIDE SCH MLS/HR: 900 INJECTION, SOLUTION INTRAVENOUS at 04:46

## 2018-12-11 RX ADMIN — RISPERIDONE SCH MG: 0.25 TABLET, FILM COATED ORAL at 09:02

## 2018-12-11 RX ADMIN — POTASSIUM CHLORIDE SCH MEQ: 750 TABLET, FILM COATED, EXTENDED RELEASE ORAL at 09:01

## 2018-12-11 RX ADMIN — SENNOSIDES SCH MG: 8.6 TABLET, FILM COATED ORAL at 09:00

## 2018-12-11 RX ADMIN — OXYBUTYNIN CHLORIDE SCH MG: 5 TABLET ORAL at 09:02

## 2018-12-11 RX ADMIN — INSULIN ASPART SCH UNIT: 100 INJECTION, SOLUTION INTRAVENOUS; SUBCUTANEOUS at 21:24

## 2018-12-11 RX ADMIN — METFORMIN HYDROCHLORIDE SCH MG: 500 TABLET, FILM COATED ORAL at 17:30

## 2018-12-11 RX ADMIN — PHENAZOPYRIDINE HYDROCHLORIDE SCH MG: 100 TABLET ORAL at 17:34

## 2018-12-11 RX ADMIN — SENNOSIDES SCH MG: 8.6 TABLET, FILM COATED ORAL at 21:23

## 2018-12-11 RX ADMIN — BETHANECHOL CHLORIDE SCH MG: 25 TABLET ORAL at 06:45

## 2018-12-11 RX ADMIN — DOCUSATE SODIUM SCH MG: 100 CAPSULE ORAL at 21:23

## 2018-12-11 RX ADMIN — ALLOPURINOL SCH MG: 300 TABLET ORAL at 09:01

## 2018-12-11 RX ADMIN — LACTOBACILLUS ACIDOPHILUS / LACTOBACILLUS BULGARICUS SCH GM: 100 MILLION CFU STRENGTH GRANULES at 06:45

## 2018-12-11 RX ADMIN — METFORMIN HYDROCHLORIDE SCH MG: 500 TABLET, FILM COATED ORAL at 06:45

## 2018-12-11 RX ADMIN — SODIUM CHLORIDE SCH MLS/HR: 900 INJECTION INTRAVENOUS at 09:03

## 2018-12-11 RX ADMIN — BETHANECHOL CHLORIDE SCH MG: 25 TABLET ORAL at 21:23

## 2018-12-11 RX ADMIN — DOCUSATE SODIUM SCH MG: 100 CAPSULE ORAL at 09:02

## 2018-12-11 RX ADMIN — ALBUTEROL SULFATE SCH MG: 2.5 SOLUTION RESPIRATORY (INHALATION) at 22:27

## 2018-12-11 RX ADMIN — AMIODARONE HYDROCHLORIDE SCH MG: 200 TABLET ORAL at 09:01

## 2018-12-11 RX ADMIN — INSULIN ASPART SCH UNIT: 100 INJECTION, SOLUTION INTRAVENOUS; SUBCUTANEOUS at 11:30

## 2018-12-11 RX ADMIN — LACTOBACILLUS ACIDOPHILUS / LACTOBACILLUS BULGARICUS SCH GM: 100 MILLION CFU STRENGTH GRANULES at 17:31

## 2018-12-11 RX ADMIN — BETHANECHOL CHLORIDE SCH MG: 25 TABLET ORAL at 12:26

## 2018-12-11 RX ADMIN — LISINOPRIL SCH MG: 20 TABLET ORAL at 09:02

## 2018-12-11 RX ADMIN — SODIUM CHLORIDE SCH MLS/HR: 900 INJECTION, SOLUTION INTRAVENOUS at 18:43

## 2018-12-11 RX ADMIN — OXYBUTYNIN CHLORIDE SCH MG: 5 TABLET ORAL at 21:23

## 2018-12-11 RX ADMIN — PHENAZOPYRIDINE HYDROCHLORIDE SCH MG: 100 TABLET ORAL at 13:23

## 2018-12-11 NOTE — DIAGNOSTIC IMAGING REPORT
Indication: Fever and pneumonia.



Frontal chest obtained at 837 hours a.m. and compared with

11/20/2018.



There is cardiomegaly and poststernotomy change. There is mild

central vascular congestion.  There is new infiltrate in the

right medial base.  There is some infiltrate in the left medial

base as well with obscuration of the left hemidiaphragm. There is

no pneumothorax. Left-sided PICC line is unchanged with tip

overlying the SVC.



Impression:  

Cardiomegaly with mild increase in central vascular congestion

compared to the prior study. There are new bibasilar infiltrates

right greater than left.



Dictated by: 



  Dictated on workstation # [id number]

## 2018-12-11 NOTE — PROGRESS NOTE-HOSPITALIST
LISA QUIÑONES DO 12/11/18 0843:


Subjective


HPI/CC On Admission


Date Seen by Provider:  Dec 11, 2018


Time Seen by Provider:  08:30


Subjective/Events-last exam


Patient a bit better mentation but now low grade fever with cough and mild 

tachypnea noted


Cefepime and Vanc both on board so checking CXR and labs


Updated son


Filled out FMLA forms





Review of Systems


Pulmonary:  Cough





Objective


Exam


Vital Signs





Vital Signs








  Date Time  Temp Pulse Resp B/P (MAP) Pulse Ox O2 Delivery O2 Flow Rate FiO2


 


12/11/18 20:03 98.0 67 14 108/58 (75) 91 Room Air  


 


12/11/18 15:33        21





Capillary Refill :


General Appearance:  No Apparent Distress, WD/WN, Chronically ill


Respiratory:  Chest Non Tender, Accessory Muscle Use, Crackles, Decreased 

Breath Sounds, Wheezing


Cardiovascular:  Regular Rate, Rhythm, No Edema, No Gallop, No JVD, No Murmur, 

Normal Peripheral Pulses


Neurologic/Psychiatric:  Alert, Disoriented





Results/Procedures


Lab


Laboratory Tests


12/11/18 09:20








Patient resulted labs reviewed.





Assessment/Plan


Assessment and Plan


Assess & Plan/Chief Complaint


Assessment: 


1) Acute delirium


2) Pseudomonas UTI


3) Debility due to repeat surgeries


4) Intraspinal abscess


5) Urinary retention


6) Renal insufficiency 


7) C. diff colitis (resolved)


8) Diabetes





PLan:


Continue treatment


Palliative care management is appreciated


Monitor





Diagnosis/Problems


Diagnosis/Problems





(1) Wheezing


Status:  Acute


(2) Fever


Status:  Acute


Qualifiers:  


   Fever type:  unspecified  Qualified Codes:  R50.9 - Fever, unspecified


(3) Delirium


Status:  Acute


(4) Pseudomonas urinary tract infection


Status:  Acute


(5) Yeast UTI


Status:  Acute


(6) Advanced age


Status:  Chronic


(7) Renal insufficiency


Status:  Resolved


Resolution Date/Time:  11/19/18 @ 09:41





MACHO QUINTERO MED STUDENT 12/11/18 0905:


Subjective


Subjective/Events-last exam


Patient is still very confused and agitated


Patient has not been eating well


Son reports that the patient has not been complaining of pain


Patient continues to decline





Objective


Exam


General Appearance:  Chronically ill


Respiratory:  Chest Non Tender, Wheezing


Cardiovascular:  Regular Rate, Rhythm


Extremity:  Pedal Edema


Neurologic/Psychiatric:  Disoriented


Skin:  Normal Color, Warm/Dry





Assessment/Plan


Assessment and Plan


Assess & Plan/Chief Complaint


Assessment: 


1) Acute delirium


2) Pseudomonas UTI


3) Debility due to repeat surgeries


4) Intraspinal abscess


5) Urinary retention


6) Renal insufficiency 


7) C. diff colitis (resolved)


8) Diabetes


9) Possible pneumonia





Plan:


1) Continue antibiotics for UTI


2) Ambulate to chair


3) 3 more weeks of vancomycin


4) Admit to swingbed


5) Physical rehabilitation consult


6) Wound vac change M/W/F


7) Possible inpatient rehab by end of week


8) Breathing treatments


9) CXR











LISA QUIÑONES DO Dec 11, 2018 08:43


MACHO QUINTERO MED STUDENT Dec 11, 2018 09:05

## 2018-12-11 NOTE — PHYSICAL THERAPY DAILY NOTE
PT Daily Note-Current


Subjective


Patient continues to be confused but more alert.





Pain





   Numeric Pain Scale:  0-No Pain


   Location:  No Pain Reported





Mental Status


Patient Orientation:  Confused


Attachments:  Luis Catheter, IV


wound vac





Transfers


Therapy Code Descriptions/Definitions 





Functional [place] Measure:


0=Not Assessed/NA        4=Minimal Assistance


1=Total Assistance        5=Supervision or Setup


2=Maximal Assistance  6=Modified [place]


3=Moderate Assistance 7=Complete [place]








Therapy Quality Codes:


6    Independent with activity with or without an assistive device


5    Patient requires set up or clean up by helper.  Patient completes activity

  by  themselves


4    Supervision or touching assist (CGA). [place] provide cues , steadying 

assist


3    The helper provides less than half the effort to complete the activity


2    The helper provides more than half the effort to complete the activity


1    Dependent.  The helper does all the effort to complete an activity 


7    Patient refused to complete or attempt activity


9    The patient did not perform the activity before the current illness or 

injury


88  Not attempted due to Medical conditions or safety concerns


Transfers (B, C, W/C) (FIM):  2


Scootin


Rollin


Roll Left to Right (QC):  2


Supine to/from Sit:  2


Sit to/from Stand:  2


Sit to Lying (QC):  2


Sit to Stand (QC):  2


Chair/Bed-to-Chair Xfer(QC):  2


Bed to/from Chair:  2


max assist x 2 with sit to stand transfers x 3 sets with patient attempting to 

utilize FWW to take steps, however, patient is impulsive with use and unsafe by 

demonstrating abrupt sit to recliner with PT and assist of tech to attain safely





Weight Bearing


Right Lower Extremity:  Right


Full Weight Bearing


Left Lower Extremity:  Left


Full Weight Bearing





Exercises


Supine Ex:  Ankle pumps, Quad Set, Heel Slides


Supine Reps:  15 (AAROM)


Seated Therapy Exercises:  Ankle pumps, Long arc quads


Seated Reps:  15 (2 sets)





Assessment


Patient is improving very slowly with treatment plan due to medical status.  

Patient currently has been diagnosed with pneumonia.





PT Long Term Goals


Long Term Goals


PT Long Term Goals Time Frame:  Dec 29, 2018


Transfers (B,C,W/C) (FIM):  4


Sit to Lying (QC):  4


Lying-Sitting on Side/Bed(QC):  4


Sit to Stand (QC):  4


Rollin


Roll Left to Right (QC):  4


Chair/Bed-to-Chair Xfer(QC):  4


Car Transfer (QC):  4


Does the Patient Walk:  No and Walking Goal IS indicated


Gait (FIM):  1


Gait distance (FIM):  1=up to 49 ft


Distance:  15'


Walk 10 feet (QC):  4


Walk 10ft-Uneven Surface(QC):  4


Walk 50ft with 2 Turns (QC):  88


Walk 150 ft (QC):  88


Gait Level of Assist:  4


Gait Assistive Device:  FWW





PT Plan


Treatment/Plan


Treatment Plan:  Continue Plan of Care


Treatment Plan:  Bed Mobility, Education, Functional Activity Samina, Functional 

Strength, Gait, Safety, Therapeutic Exercise, Transfers


Treatment Duration:  Dec 29, 2018


Frequency:  6-11/wk as patient medical status improves


Estimated Hrs Per Day:  .5 hour per day


Patient and/or Family Agrees t:  Yes





Time/GCodes


Time In:  845


Time Out:  908


Total Billed Treatment Time:  23


Total Billed Treatment


1 visit


FA 10 min


EX 13 min











DANA CROOKS PT Dec 11, 2018 09:51

## 2018-12-11 NOTE — PHYSICAL THERAPY DAILY NOTE
PT Daily Note-Current


Subjective


Patient is in the recliner.  Confused.  Spouse present.





Pain





   Numeric Pain Scale:  0-No Pain


   Location:  No Pain Reported





Mental Status


Patient Orientation:  Confused


Attachments:  Luis Catheter, IV


wound vac





Transfers


Therapy Code Descriptions/Definitions 





Functional [place] Measure:


0=Not Assessed/NA        4=Minimal Assistance


1=Total Assistance        5=Supervision or Setup


2=Maximal Assistance  6=Modified [place]


3=Moderate Assistance 7=Complete [place]








Therapy Quality Codes:


6    Independent with activity with or without an assistive device


5    Patient requires set up or clean up by helper.  Patient completes activity

  by  themselves


4    Supervision or touching assist (CGA). [place] provide cues , steadying 

assist


3    The helper provides less than half the effort to complete the activity


2    The helper provides more than half the effort to complete the activity


1    Dependent.  The helper does all the effort to complete an activity 


7    Patient refused to complete or attempt activity


9    The patient did not perform the activity before the current illness or 

injury


88  Not attempted due to Medical conditions or safety concerns


Transfers (B, C, W/C) (FIM):  1


Scootin


Rollin


Roll Left to Right (QC):  1


Supine to/from Sit:  1


Sit to/from Stand:  1


Sit to Lying (QC):  1


Sit to Stand (QC):  1


Chair/Bed-to-Chair Xfer(QC):  1


Bed to/from Chair:  1


patient is dependent assist with all mobility due to confusion and fatigue.  

Patient repositioned to side lying right with pillow placement and 4 rails up 

for safety.





Weight Bearing


Right Lower Extremity:  Right


Full Weight Bearing


Left Lower Extremity:  Left


Full Weight Bearing





Assessment


Patient continues to have difficulty with following simple direction due to 

confusion.  This PT voices concern to RN on possible sleep deprivation due to 

patient's family and staff report of patient not sleeping or able to rest 

appropriately.





PT Long Term Goals


Long Term Goals


PT Long Term Goals Time Frame:  Dec 29, 2018


Transfers (B,C,W/C) (FIM):  4


Sit to Lying (QC):  4


Lying-Sitting on Side/Bed(QC):  4


Sit to Stand (QC):  4


Rollin


Roll Left to Right (QC):  4


Chair/Bed-to-Chair Xfer(QC):  4


Car Transfer (QC):  4


Does the Patient Walk:  No and Walking Goal IS indicated


Gait (FIM):  1


Gait distance (FIM):  1=up to 49 ft


Distance:  15'


Walk 10 feet (QC):  4


Walk 10ft-Uneven Surface(QC):  4


Walk 50ft with 2 Turns (QC):  88


Walk 150 ft (QC):  88


Gait Level of Assist:  4


Gait Assistive Device:  FWW





PT Plan


Treatment/Plan


Treatment Plan:  Continue Plan of Care


Treatment Plan:  Bed Mobility, Education, Functional Activity Samina, Functional 

Strength, Gait, Safety, Therapeutic Exercise, Transfers


Treatment Duration:  Dec 29, 2018


Frequency:  6-11/wk as patient medical status improves


Estimated Hrs Per Day:  .5 hour per day


Patient and/or Family Agrees t:  Yes





Time/GCodes


Time In:  1305


Time Out:  1320


Total Billed Treatment Time:  15


Total Billed Treatment


1 visit


FA 15 min











DANA CROOKS PT Dec 11, 2018 14:31

## 2018-12-11 NOTE — OCCUPATIONAL THER DAILY NOTE
OT Current Status-Daily Note


Subjective


Pt seen in room, up in recliner, agreeable to OT. No pain mentioned





Appearance


Confused, often reaching for things that aren't there, looking for things. Has 

new pneumonia diagnosis





Mental Status/Objective


Patient Orientation:  Person


Therapy Code Descriptions/Definitions 





Functional [place] Measure:


0=Not Assessed/NA        4=Minimal Assistance


1=Total Assistance        5=Supervision or Setup


2=Maximal Assistance  6=Modified [place]


3=Moderate Assistance 7=Complete [place]





ADL-Treatment


Pt up in recliner, reaching for things that weren't there. Able to hold ice 

cream cup and feed himself, with supervision, which helped him focus. After tx, 

pt left up in recliner, wife present, all needs met.


Therapy Code Descriptions/Definitions 





Functional [place] Measure:


0=Not Assessed/NA        4=Minimal Assistance


1=Total Assistance        5=Supervision or Setup


2=Maximal Assistance  6=Modified [place]


3=Moderate Assistance 7=Complete [place]








Therapy Quality Codes:


6    Independent with activity with or without an assistive device


5    Patient requires set up or clean up by helper.  Patient completes activity

  by  themselves


4    Supervision or touching assist (CGA). [place] provide cues , steadying 

assist


3    The helper provides less than half the effort to complete the activity


2    The helper provides more than half the effort to complete the activity


1    Dependent.  The helper does all the effort to complete an activity 


7    Patient refused to complete or attempt activity


9    The patient did not perform the activity before the current illness or 

injury


88  Not attempted due to Medical conditions or safety concerns


Eating (FIM):  5 (He was able to hold ice cream cup and feed himself, 

supervision. Dropped cup at one point and replaced it. Turned light on to make 

it easier to see what he was doing but he often had his eyes closed. Sometimes 

missed his mouth with the spoon but self-corrected. )





Education


OT Patient Education:  Progress toward Goal/Update tx plan, Purpose of tx/

functional activities


Teaching Recipient:  Patient


Teaching Methods:  Discussion


Response to Teaching:  Return Demonstration





OT Short Term Goals


Short Term Goals


Time Frame:  Dec 17, 2018


Eating(FIM):  5


Grooming(FIM):  4


Bathing(FIM):  3


Upper Body Dressing(FIM):  4


Lower Body Dressing(FIM):  3


Toileting(FIM):  3


Toilet/Commode Transfer(FIM):  3


Additional Short Term Goals:  1-Demonstrate ADL Tasks, 2-Verbalize Understanding

, 3-ImproveStrength/Samina


1=Demonstrate adherence to instructed precautions during ADL tasks.


2=Patient will verbalize/demonstrate understanding of assistive devices/

modifications for ADL.


3=Patient will improve strength/tolerance for activity to enable patient to 

perform ADL's.





OT Long Term Goals


Long Term Goals


Time Frame:  Dec 29, 2018


Eating (FIM):  6


Eating (QC):  6


Groomin


Oral Hygiene (QC):  5


Bathing(FIM):  5


Shower/Bathe Self (QC):  4


Upper Body Dressing(FIM):  5


Upper Body Dressing (QC):  4


Lower Body Dressing(FIM):  5


Lower Body Dressing (QC):  4


On/Off Footwear (QC):  5


Toileting(FIM):  5


Toileting Hygiene (QC):  4


Toilet/Commode Transfer(FIM):  5


Toilet/Commode Transfer (QC):  4


Shower Transfer(FIM):  4


Additional Goals:  1-Demonstrate ADL Tasks, 2-Verbalize Understanding, 3-

ImproveStrength/Samina


1=Demonstrate adherence to instructed precautions during ADL tasks.


2=Patient will verbalize/demonstrate understanding of assistive devices/

modifications for ADL.


3=Patient will improve strength/tolerance for activity to enable patient to 

perform ADL's.





OT Education/Plan


Problem List/Assessment


Pt would benefit from skilled OT to increase his independence in basic self care





Discharge Recommendations


Plan/Recommendations:  Continue POC





Treatment Plan/Plan of Care


Patient would benefit from OT for education, treatment and training to promote 

independence in ADL's, mobility, safety and/or upper extremity function for ADL'

s.


Plan of Care:  ADL Retraining, Functional Mobility, UE Funct Exercise/Act, UE 

Neuromus Re-Ed/Coord


Treatment Duration:  Dec 29, 2018


Frequency:  5 times per week


Estimated Hrs Per Day:  .5 hour per day


Agreement:  Yes


Rehab Potential:  Guarded





Time/GCodes


Start Time:  12:47


Stop Time:  13:02


Total Time Billed (hr/min):  15


Billed Treatment Time


visit, 15 minutes ADL











FERNANDO SMITH OT Dec 11, 2018 13:38

## 2018-12-12 VITALS — SYSTOLIC BLOOD PRESSURE: 142 MMHG | DIASTOLIC BLOOD PRESSURE: 56 MMHG

## 2018-12-12 VITALS — SYSTOLIC BLOOD PRESSURE: 108 MMHG | DIASTOLIC BLOOD PRESSURE: 50 MMHG

## 2018-12-12 VITALS — DIASTOLIC BLOOD PRESSURE: 70 MMHG | SYSTOLIC BLOOD PRESSURE: 171 MMHG

## 2018-12-12 VITALS — SYSTOLIC BLOOD PRESSURE: 118 MMHG | DIASTOLIC BLOOD PRESSURE: 53 MMHG

## 2018-12-12 VITALS — DIASTOLIC BLOOD PRESSURE: 59 MMHG | SYSTOLIC BLOOD PRESSURE: 116 MMHG

## 2018-12-12 VITALS — DIASTOLIC BLOOD PRESSURE: 56 MMHG | SYSTOLIC BLOOD PRESSURE: 118 MMHG

## 2018-12-12 LAB
ALBUMIN SERPL-MCNC: 2.2 GM/DL (ref 3.2–4.5)
ALP SERPL-CCNC: 155 U/L (ref 40–136)
ALT SERPL-CCNC: 42 U/L (ref 0–55)
BASOPHILS # BLD AUTO: 0 10^3/UL (ref 0–0.1)
BASOPHILS NFR BLD AUTO: 0 % (ref 0–10)
BILIRUB SERPL-MCNC: 0.5 MG/DL (ref 0.1–1)
BUN/CREAT SERPL: 10
CALCIUM SERPL-MCNC: 7.6 MG/DL (ref 8.5–10.1)
CHLORIDE SERPL-SCNC: 109 MMOL/L (ref 98–107)
CO2 SERPL-SCNC: 23 MMOL/L (ref 21–32)
CREAT SERPL-MCNC: 1.24 MG/DL (ref 0.6–1.3)
EOSINOPHIL # BLD AUTO: 0.4 10^3/UL (ref 0–0.3)
EOSINOPHIL NFR BLD AUTO: 4 % (ref 0–10)
ERYTHROCYTE [DISTWIDTH] IN BLOOD BY AUTOMATED COUNT: 17.2 % (ref 10–14.5)
GFR SERPLBLD BASED ON 1.73 SQ M-ARVRAT: 56 ML/MIN
GLUCOSE SERPL-MCNC: 78 MG/DL (ref 70–105)
HCT VFR BLD CALC: 26 % (ref 40–54)
HGB BLD-MCNC: 8.2 G/DL (ref 13.3–17.7)
LYMPHOCYTES # BLD AUTO: 1.8 X 10^3 (ref 1–4)
LYMPHOCYTES NFR BLD AUTO: 17 % (ref 12–44)
MANUAL DIFFERENTIAL PERFORMED BLD QL: NO
MCH RBC QN AUTO: 30 PG (ref 25–34)
MCHC RBC AUTO-ENTMCNC: 32 G/DL (ref 32–36)
MCV RBC AUTO: 94 FL (ref 80–99)
MONOCYTES # BLD AUTO: 0.9 X 10^3 (ref 0–1)
MONOCYTES NFR BLD AUTO: 9 % (ref 0–12)
NEUTROPHILS # BLD AUTO: 7.5 X 10^3 (ref 1.8–7.8)
NEUTROPHILS NFR BLD AUTO: 70 % (ref 42–75)
PLATELET # BLD: 282 10^3/UL (ref 130–400)
PMV BLD AUTO: 9.2 FL (ref 7.4–10.4)
POTASSIUM SERPL-SCNC: 4 MMOL/L (ref 3.6–5)
PROT SERPL-MCNC: 4.2 GM/DL (ref 6.4–8.2)
RBC # BLD AUTO: 2.77 10^6/UL (ref 4.35–5.85)
SODIUM SERPL-SCNC: 139 MMOL/L (ref 135–145)
WBC # BLD AUTO: 10.7 10^3/UL (ref 4.3–11)

## 2018-12-12 RX ADMIN — FLUCONAZOLE IN SODIUM CHLORIDE SCH MLS/HR: 2 INJECTION, SOLUTION INTRAVENOUS at 11:24

## 2018-12-12 RX ADMIN — DOCUSATE SODIUM SCH MG: 100 CAPSULE ORAL at 22:19

## 2018-12-12 RX ADMIN — TAMSULOSIN HYDROCHLORIDE SCH MG: 0.4 CAPSULE ORAL at 17:13

## 2018-12-12 RX ADMIN — INSULIN ASPART SCH UNIT: 100 INJECTION, SOLUTION INTRAVENOUS; SUBCUTANEOUS at 22:20

## 2018-12-12 RX ADMIN — OXYBUTYNIN CHLORIDE SCH MG: 5 TABLET ORAL at 14:27

## 2018-12-12 RX ADMIN — METOPROLOL TARTRATE SCH MG: 25 TABLET, FILM COATED ORAL at 09:36

## 2018-12-12 RX ADMIN — PIOGLITAZONE SCH MG: 30 TABLET ORAL at 09:37

## 2018-12-12 RX ADMIN — SIMVASTATIN SCH MG: 10 TABLET, FILM COATED ORAL at 22:19

## 2018-12-12 RX ADMIN — ALLOPURINOL SCH MG: 300 TABLET ORAL at 09:37

## 2018-12-12 RX ADMIN — OXYBUTYNIN CHLORIDE SCH MG: 5 TABLET ORAL at 22:19

## 2018-12-12 RX ADMIN — ALBUTEROL SULFATE SCH MG: 2.5 SOLUTION RESPIRATORY (INHALATION) at 07:21

## 2018-12-12 RX ADMIN — SODIUM CHLORIDE SCH MG: 900 INJECTION, SOLUTION INTRAVENOUS at 17:34

## 2018-12-12 RX ADMIN — FAMOTIDINE SCH MG: 20 TABLET, FILM COATED ORAL at 09:36

## 2018-12-12 RX ADMIN — ALBUTEROL SULFATE SCH MG: 2.5 SOLUTION RESPIRATORY (INHALATION) at 15:03

## 2018-12-12 RX ADMIN — PHENAZOPYRIDINE HYDROCHLORIDE SCH MG: 100 TABLET ORAL at 17:13

## 2018-12-12 RX ADMIN — RISPERIDONE SCH MG: 0.25 TABLET, FILM COATED ORAL at 22:18

## 2018-12-12 RX ADMIN — Medication SCH EA: at 09:36

## 2018-12-12 RX ADMIN — SODIUM CHLORIDE SCH MLS/HR: 900 INJECTION INTRAVENOUS at 07:59

## 2018-12-12 RX ADMIN — BETHANECHOL CHLORIDE SCH MG: 25 TABLET ORAL at 17:14

## 2018-12-12 RX ADMIN — BETHANECHOL CHLORIDE SCH MG: 25 TABLET ORAL at 22:18

## 2018-12-12 RX ADMIN — METFORMIN HYDROCHLORIDE SCH MG: 500 TABLET, FILM COATED ORAL at 09:36

## 2018-12-12 RX ADMIN — OXYBUTYNIN CHLORIDE SCH MG: 5 TABLET ORAL at 09:37

## 2018-12-12 RX ADMIN — BETHANECHOL CHLORIDE SCH MG: 25 TABLET ORAL at 14:27

## 2018-12-12 RX ADMIN — PHENAZOPYRIDINE HYDROCHLORIDE SCH MG: 100 TABLET ORAL at 14:26

## 2018-12-12 RX ADMIN — RISPERIDONE SCH MG: 0.25 TABLET, FILM COATED ORAL at 09:36

## 2018-12-12 RX ADMIN — INSULIN ASPART SCH UNIT: 100 INJECTION, SOLUTION INTRAVENOUS; SUBCUTANEOUS at 17:11

## 2018-12-12 RX ADMIN — INSULIN ASPART SCH UNIT: 100 INJECTION, SOLUTION INTRAVENOUS; SUBCUTANEOUS at 11:51

## 2018-12-12 RX ADMIN — LACTOBACILLUS ACIDOPHILUS / LACTOBACILLUS BULGARICUS SCH GM: 100 MILLION CFU STRENGTH GRANULES at 21:00

## 2018-12-12 RX ADMIN — SODIUM CHLORIDE SCH MLS/HR: 900 INJECTION, SOLUTION INTRAVENOUS at 11:16

## 2018-12-12 RX ADMIN — SENNOSIDES SCH MG: 8.6 TABLET, FILM COATED ORAL at 22:20

## 2018-12-12 RX ADMIN — PHENAZOPYRIDINE HYDROCHLORIDE SCH MG: 100 TABLET ORAL at 09:36

## 2018-12-12 RX ADMIN — INSULIN ASPART SCH UNIT: 100 INJECTION, SOLUTION INTRAVENOUS; SUBCUTANEOUS at 11:52

## 2018-12-12 RX ADMIN — ENOXAPARIN SODIUM SCH MG: 100 INJECTION SUBCUTANEOUS at 12:08

## 2018-12-12 RX ADMIN — AMIODARONE HYDROCHLORIDE SCH MG: 200 TABLET ORAL at 09:43

## 2018-12-12 RX ADMIN — LISINOPRIL SCH MG: 20 TABLET ORAL at 09:36

## 2018-12-12 RX ADMIN — LACTOBACILLUS ACIDOPHILUS / LACTOBACILLUS BULGARICUS SCH GM: 100 MILLION CFU STRENGTH GRANULES at 17:09

## 2018-12-12 RX ADMIN — SODIUM CHLORIDE SCH MLS/HR: 900 INJECTION INTRAVENOUS at 22:19

## 2018-12-12 RX ADMIN — SENNOSIDES SCH MG: 8.6 TABLET, FILM COATED ORAL at 09:37

## 2018-12-12 RX ADMIN — BETHANECHOL CHLORIDE SCH MG: 25 TABLET ORAL at 09:37

## 2018-12-12 RX ADMIN — POTASSIUM CHLORIDE SCH MEQ: 750 TABLET, FILM COATED, EXTENDED RELEASE ORAL at 22:19

## 2018-12-12 RX ADMIN — AMLODIPINE BESYLATE SCH MG: 5 TABLET ORAL at 09:36

## 2018-12-12 RX ADMIN — SODIUM CHLORIDE SCH MLS/HR: 900 INJECTION, SOLUTION INTRAVENOUS at 12:08

## 2018-12-12 RX ADMIN — POTASSIUM CHLORIDE SCH MEQ: 750 TABLET, FILM COATED, EXTENDED RELEASE ORAL at 09:37

## 2018-12-12 RX ADMIN — METOPROLOL TARTRATE SCH MG: 25 TABLET, FILM COATED ORAL at 22:19

## 2018-12-12 RX ADMIN — DOCUSATE SODIUM SCH MG: 100 CAPSULE ORAL at 09:36

## 2018-12-12 RX ADMIN — METFORMIN HYDROCHLORIDE SCH MG: 500 TABLET, FILM COATED ORAL at 17:13

## 2018-12-12 RX ADMIN — ALBUTEROL SULFATE SCH MG: 2.5 SOLUTION RESPIRATORY (INHALATION) at 19:15

## 2018-12-12 NOTE — PHYSICAL THERAPY DAILY NOTE
PT Daily Note-Current


Subjective


Patient in recliner pre tx, agrees to PT, no complaints of pain at rest.  

Patient would like to get back to bed.





Appearance


Patient in bed post tx with nurse call, phone, tray, bed alarm on, wife in room.





Mental Status


Patient Orientation:  Person, Confused


Attachments:  Oxygen, Luis Catheter, IV


wound vac





Transfers


Therapy Code Descriptions/Definitions 





Functional [place] Measure:


0=Not Assessed/NA        4=Minimal Assistance


1=Total Assistance        5=Supervision or Setup


2=Maximal Assistance  6=Modified [place]


3=Moderate Assistance 7=Complete [place]








Therapy Quality Codes:


6    Independent with activity with or without an assistive device


5    Patient requires set up or clean up by helper.  Patient completes activity

  by  themselves


4    Supervision or touching assist (CGA). [place] provide cues , steadying 

assist


3    The helper provides less than half the effort to complete the activity


2    The helper provides more than half the effort to complete the activity


1    Dependent.  The helper does all the effort to complete an activity 


7    Patient refused to complete or attempt activity


9    The patient did not perform the activity before the current illness or 

injury


88  Not attempted due to Medical conditions or safety concerns


Transfers (B, C, W/C) (FIM):  1


Scootin


Rollin


Supine to/from Sit:  1


Sit to/from Stand:  1


Bed to/from Chair:  1


Patient was dependent to stand and transfer.  Therapist had to lift his full 

body weight to stand, patient was able to move his legs a little but could not 

take steps to the bed, patient had to be moved while being lifted to the bed 

and then so a sitting position.  He layed down with assist of 2.





Weight Bearing


Right Lower Extremity:  Right


Full Weight Bearing


Left Lower Extremity:  Left


Full Weight Bearing





Treatments


bed mobility and transfers





Assessment


Current Status:  Poor Progress


Patient very fatigued, no able to assist with transfers





PT Long Term Goals


Long Term Goals


PT Long Term Goals Time Frame:  Dec 29, 2018


Transfers (B,C,W/C) (FIM):  4


Sit to Lying (QC):  4


Lying-Sitting on Side/Bed(QC):  4


Sit to Stand (QC):  4


Rollin


Roll Left to Right (QC):  4


Chair/Bed-to-Chair Xfer(QC):  4


Car Transfer (QC):  4


Does the Patient Walk:  No and Walking Goal IS indicated


Gait (FIM):  1


Gait distance (FIM):  1=up to 49 ft


Distance:  15'


Walk 10 feet (QC):  4


Walk 10ft-Uneven Surface(QC):  4


Walk 50ft with 2 Turns (QC):  88


Walk 150 ft (QC):  88


Gait Level of Assist:  4


Gait Assistive Device:  FWW





PT Plan


Problem List


Problem List:  Activity Tolerance, Functional Strength, Safety, Balance, Gait, 

Transfer, Bed Mobility, ROM





Treatment/Plan


Treatment Plan:  Continue Plan of Care


Treatment Plan:  Bed Mobility, Education, Functional Activity Samina, Functional 

Strength, Gait, Safety, Therapeutic Exercise, Transfers


Treatment Duration:  Dec 29, 2018


Frequency:  6-11/wk as patient medical status improves


Estimated Hrs Per Day:  .5 hour per day


Patient and/or Family Agrees t:  Yes





Safety Risks/Education


Patient Education:  Transfer Techniques, Correct Positioning, Safety Issues


Teaching Recipient:  Patient


Teaching Methods:  Demonstration, Discussion


Response to Teaching:  Reinforcement Needed





Time/GCodes


Time In:  1040


Time Out:  1052


Total Billed Treatment Time:  12


Total Billed Treatment


1 visit


FA Aly'











MARY SHEPARD PT Dec 12, 2018 10:56

## 2018-12-12 NOTE — PROGRESS NOTE-UROLOGY
Progress Note-Urology


Progress Notes/Assess & Plan


Progress/Assessment & Plan


PATIENT RESTING. URINE CLEAR. LEAVE LEWIS AND SEE HIM PRN


Final Diagnosis


URINE RETENTION











TAWIL,ELIAS A MD Dec 12, 2018 09:42

## 2018-12-12 NOTE — PROGRESS NOTE-HOSPITALIST
Subjective


HPI/CC On Admission


Date Seen by Provider:  Dec 12, 2018


Time Seen by Provider:  09:00


Subjective/Events-last exam


Pt doing much better


Wife at bedside


Breathing treatments tolerated well


No Fever


Labs improved


Overall confusion clearing


Bowels are moving


Eating and Drinking


Maintaining good status for now.





Review of Systems


Pulmonary:  Cough





Objective


Exam


Vital Signs





Vital Signs








  Date Time  Temp Pulse Resp B/P (MAP) Pulse Ox O2 Delivery O2 Flow Rate FiO2


 


12/12/18 09:00      Room Air  


 


12/12/18 08:00 100.7 77 18 118/56 (76) 89   


 


12/11/18 15:33        21





Capillary Refill :


General Appearance:  No Apparent Distress, WD/WN, Chronically ill


Respiratory:  Chest Non Tender, Lungs Clear, Normal Breath Sounds, No Accessory 

Muscle Use, No Respiratory Distress


Cardiovascular:  Regular Rate, Rhythm, No Edema, No Gallop, No JVD, No Murmur, 

Normal Peripheral Pulses


Neurologic/Psychiatric:  Alert, Oriented x3, No Motor/Sensory Deficits, Normal 

Mood/Affect





Results/Procedures


Lab


Laboratory Tests


12/12/18 06:05








Patient resulted labs reviewed.





Assessment/Plan


Assessment and Plan


Assess & Plan/Chief Complaint


Assessment: 


1A) Pneumonia facility acquired on Cefepime and Nebs


1) Acute delirium


2) Pseudomonas UTI


3) Debility due to repeat surgeries


4) Intraspinal abscess


5) Urinary retention


6) Renal insufficiency 


7) C. diff colitis (resolved)


8) Diabetes





PLan:


Continue treatment


Palliative care management is appreciated


Monitor


Monitor labs





Improved delirium


Increase PO intake


Abx coverage





Diagnosis/Problems


Diagnosis/Problems





(1) Pneumonia


Status:  Acute


Qualifiers:  


   Pneumonia type:  due to unspecified organism  Laterality:  bilateral  Lung 

location:  lower lobe of lung  Qualified Codes:  J18.1 - Lobar pneumonia, 

unspecified organism


(2) Wheezing


Status:  Resolved


Resolution Date/Time:  12/12/18 @ 11:37


(3) Fever


Status:  Resolved


Qualifiers:  


   Fever type:  unspecified  Qualified Codes:  R50.9 - Fever, unspecified


Resolution Date/Time:  12/12/18 @ 11:37


(4) Delirium


Status:  Acute


(5) Pseudomonas urinary tract infection


Status:  Acute


(6) Yeast UTI


Status:  Acute


(7) Advanced age


Status:  Chronic


(8) Renal insufficiency


Status:  Resolved


Resolution Date/Time:  11/19/18 @ 09:41











LISA QUIÑONES DO Dec 12, 2018 10:53

## 2018-12-12 NOTE — PHYSICAL THERAPY DAILY NOTE
PT Daily Note-Current


Subjective


Patient in bed pre tx, agrees to PT, no complaints of pain.  Patient is 

pleasant and cooperative but confused.





Appearance


Patient in recliner post tx with nurse call, phone, tray, all needs met.  Wife 

in room.





Mental Status


Patient Orientation:  Person, Confused


Attachments:  Luis Catheter, IV


wound vac





Transfers


Therapy Code Descriptions/Definitions 





Functional [place] Measure:


0=Not Assessed/NA        4=Minimal Assistance


1=Total Assistance        5=Supervision or Setup


2=Maximal Assistance  6=Modified [place]


3=Moderate Assistance 7=Complete [place]








Therapy Quality Codes:


6    Independent with activity with or without an assistive device


5    Patient requires set up or clean up by helper.  Patient completes activity

  by  themselves


4    Supervision or touching assist (CGA). [place] provide cues , steadying 

assist


3    The helper provides less than half the effort to complete the activity


2    The helper provides more than half the effort to complete the activity


1    Dependent.  The helper does all the effort to complete an activity 


7    Patient refused to complete or attempt activity


9    The patient did not perform the activity before the current illness or 

injury


88  Not attempted due to Medical conditions or safety concerns


Transfers (B, C, W/C) (FIM):  2


Scootin


Rollin


Supine to/from Sit:  2


Sit to/from Stand:  2


Bed to/from Chair:  2


Cues for hand placement and safety.





Weight Bearing


Right Lower Extremity:  Right


Full Weight Bearing


Left Lower Extremity:  Left


Full Weight Bearing





Gait Training


Gait (FIM):  1


Distance:  3'


Gait Level of Assist:  2


Gait Persons Needed:  1


Gait Assistive Device:  Handheld Assist


Patient ambulated just a few steps to recliner from bed.  He held onto 

therapist forearms.  He tends to lunge toward chair to sit.





Exercises


Seated Therapy Exercises:  Ankle pumps, Long arc quads


Seated Reps:  20





Treatments


bed mobility and transfers, ambulation, functional strengthening





Assessment


Current Status:  Fair Progress


improved ambulation





PT Long Term Goals


Long Term Goals


PT Long Term Goals Time Frame:  Dec 29, 2018


Transfers (B,C,W/C) (FIM):  4


Sit to Lying (QC):  4


Lying-Sitting on Side/Bed(QC):  4


Sit to Stand (QC):  4


Rollin


Roll Left to Right (QC):  4


Chair/Bed-to-Chair Xfer(QC):  4


Car Transfer (QC):  4


Does the Patient Walk:  No and Walking Goal IS indicated


Gait (FIM):  1


Gait distance (FIM):  1=up to 49 ft


Distance:  15'


Walk 10 feet (QC):  4


Walk 10ft-Uneven Surface(QC):  4


Walk 50ft with 2 Turns (QC):  88


Walk 150 ft (QC):  88


Gait Level of Assist:  4


Gait Assistive Device:  FWW





PT Plan


Problem List


Problem List:  Activity Tolerance, Functional Strength, Safety, Balance, Gait, 

Transfer, Bed Mobility, ROM





Treatment/Plan


Treatment Plan:  Continue Plan of Care


Treatment Plan:  Bed Mobility, Education, Functional Activity Samina, Functional 

Strength, Gait, Safety, Therapeutic Exercise, Transfers


Treatment Duration:  Dec 29, 2018


Frequency:  6-11/wk as patient medical status improves


Estimated Hrs Per Day:  .5 hour per day


Patient and/or Family Agrees t:  Yes





Safety Risks/Education


Patient Education:  Gait Training, Transfer Techniques, Correct Positioning, 

Safety Issues


Teaching Recipient:  Patient


Teaching Methods:  Demonstration, Discussion


Response to Teaching:  Reinforcement Needed





Time/GCodes


Time In:  0900


Time Out:  0915


Total Billed Treatment Time:  15


Total Billed Treatment


1 visit


FA 15'











MARY SHEPARD PT Dec 12, 2018 09:28

## 2018-12-12 NOTE — OCCUPATIONAL THER DAILY NOTE
OT Current Status-Daily Note


Subjective


Pt seen in room, up in bed, agreeable to OT. No pain mentioned





Appearance


Alert, cooperative once awakened





Mental Status/Objective


Therapy Code Descriptions/Definitions 





Functional [place] Measure:


0=Not Assessed/NA        4=Minimal Assistance


1=Total Assistance        5=Supervision or Setup


2=Maximal Assistance  6=Modified [place]


3=Moderate Assistance 7=Complete [place]





ADL-Treatment


Pt was initially sleeping but easily awakened. Pt's wife reported that he 

brushed his teeth with setup this morning. He also fed himself a milkshake and 

ate part of a banana but she is concerned that he's not eating much. She will 

continue to encourage him to participate in his own self care when he is able.


Therapy Code Descriptions/Definitions 





Functional [place] Measure:


0=Not Assessed/NA        4=Minimal Assistance


1=Total Assistance        5=Supervision or Setup


2=Maximal Assistance  6=Modified [place]


3=Moderate Assistance 7=Complete [place]








Therapy Quality Codes:


6    Independent with activity with or without an assistive device


5    Patient requires set up or clean up by helper.  Patient completes activity

  by  themselves


4    Supervision or touching assist (CGA). [place] provide cues , steadying 

assist


3    The helper provides less than half the effort to complete the activity


2    The helper provides more than half the effort to complete the activity


1    Dependent.  The helper does all the effort to complete an activity 


7    Patient refused to complete or attempt activity


9    The patient did not perform the activity before the current illness or 

injury


88  Not attempted due to Medical conditions or safety concerns





Other Treatment


 Pt was willing to complete bilat UE AROM and rolled on to his back to do the 

exercises. He was able to track repetitions and said that it felt good to move 

his arms. L forearm has protective pillow for IV. Pt left on his back, HOB 

elevated, L arm elevated on pillows, all needs met.





Education


OT Patient Education:  Exercise program, Purpose of tx/functional activities


Teaching Recipient:  Patient, Family


Teaching Methods:  Demonstration


Response to Teaching:  Verbalize Understanding, Return Demonstration





OT Short Term Goals


Short Term Goals


Time Frame:  Dec 17, 2018


Eating(FIM):  5


Grooming(FIM):  4


Bathing(FIM):  3


Upper Body Dressing(FIM):  4


Lower Body Dressing(FIM):  3


Toileting(FIM):  3


Toilet/Commode Transfer(FIM):  3


Additional Short Term Goals:  1-Demonstrate ADL Tasks, 2-Verbalize Understanding

, 3-ImproveStrength/Samina


1=Demonstrate adherence to instructed precautions during ADL tasks.


2=Patient will verbalize/demonstrate understanding of assistive devices/

modifications for ADL.


3=Patient will improve strength/tolerance for activity to enable patient to 

perform ADL's.





OT Long Term Goals


Long Term Goals


Time Frame:  Dec 29, 2018


Eating (FIM):  6


Eating (QC):  6


Groomin


Oral Hygiene (QC):  5


Bathing(FIM):  5


Shower/Bathe Self (QC):  4


Upper Body Dressing(FIM):  5


Upper Body Dressing (QC):  4


Lower Body Dressing(FIM):  5


Lower Body Dressing (QC):  4


On/Off Footwear (QC):  5


Toileting(FIM):  5


Toileting Hygiene (QC):  4


Toilet/Commode Transfer(FIM):  5


Toilet/Commode Transfer (QC):  4


Shower Transfer(FIM):  4


Additional Goals:  1-Demonstrate ADL Tasks, 2-Verbalize Understanding, 3-

ImproveStrength/Samina


1=Demonstrate adherence to instructed precautions during ADL tasks.


2=Patient will verbalize/demonstrate understanding of assistive devices/

modifications for ADL.


3=Patient will improve strength/tolerance for activity to enable patient to 

perform ADL's.





OT Education/Plan


Problem List/Assessment


Pt would benefit from skilled OT to increase his independence in basic self care





Discharge Recommendations


Plan/Recommendations:  Continue POC





Treatment Plan/Plan of Care


Patient would benefit from OT for education, treatment and training to promote 

independence in ADL's, mobility, safety and/or upper extremity function for ADL'

s.


Plan of Care:  ADL Retraining, Functional Mobility, UE Funct Exercise/Act, UE 

Neuromus Re-Ed/Coord


Treatment Duration:  Dec 29, 2018


Frequency:  5 times per week


Estimated Hrs Per Day:  .5 hour per day


Agreement:  Yes


Rehab Potential:  Guarded





Time/GCodes


Start Time:  14:12


Stop Time:  14:27


Total Time Billed (hr/min):  15


Billed Treatment Time


visit, 5 minutes ADL, 10 minutes exercise











FERNANDO SMITH OT Dec 12, 2018 14:46

## 2018-12-13 VITALS — SYSTOLIC BLOOD PRESSURE: 125 MMHG | DIASTOLIC BLOOD PRESSURE: 59 MMHG

## 2018-12-13 VITALS — SYSTOLIC BLOOD PRESSURE: 121 MMHG | DIASTOLIC BLOOD PRESSURE: 51 MMHG

## 2018-12-13 VITALS — SYSTOLIC BLOOD PRESSURE: 150 MMHG | DIASTOLIC BLOOD PRESSURE: 67 MMHG

## 2018-12-13 VITALS — SYSTOLIC BLOOD PRESSURE: 120 MMHG | DIASTOLIC BLOOD PRESSURE: 47 MMHG

## 2018-12-13 VITALS — DIASTOLIC BLOOD PRESSURE: 52 MMHG | SYSTOLIC BLOOD PRESSURE: 112 MMHG

## 2018-12-13 VITALS — SYSTOLIC BLOOD PRESSURE: 152 MMHG | DIASTOLIC BLOOD PRESSURE: 65 MMHG

## 2018-12-13 LAB
ALBUMIN SERPL-MCNC: 2.3 GM/DL (ref 3.2–4.5)
ALP SERPL-CCNC: 175 U/L (ref 40–136)
ALT SERPL-CCNC: 45 U/L (ref 0–55)
BASOPHILS # BLD AUTO: 0 10^3/UL (ref 0–0.1)
BASOPHILS NFR BLD AUTO: 0 % (ref 0–10)
BILIRUB SERPL-MCNC: 0.4 MG/DL (ref 0.1–1)
BUN/CREAT SERPL: 11
CALCIUM SERPL-MCNC: 8 MG/DL (ref 8.5–10.1)
CHLORIDE SERPL-SCNC: 107 MMOL/L (ref 98–107)
CO2 SERPL-SCNC: 20 MMOL/L (ref 21–32)
CREAT SERPL-MCNC: 1.27 MG/DL (ref 0.6–1.3)
EOSINOPHIL # BLD AUTO: 0.4 10^3/UL (ref 0–0.3)
EOSINOPHIL NFR BLD AUTO: 4 % (ref 0–10)
ERYTHROCYTE [DISTWIDTH] IN BLOOD BY AUTOMATED COUNT: 17.6 % (ref 10–14.5)
GFR SERPLBLD BASED ON 1.73 SQ M-ARVRAT: 55 ML/MIN
GLUCOSE SERPL-MCNC: 117 MG/DL (ref 70–105)
HCT VFR BLD CALC: 28 % (ref 40–54)
HGB BLD-MCNC: 8.7 G/DL (ref 13.3–17.7)
LYMPHOCYTES # BLD AUTO: 1.7 X 10^3 (ref 1–4)
LYMPHOCYTES NFR BLD AUTO: 16 % (ref 12–44)
MANUAL DIFFERENTIAL PERFORMED BLD QL: NO
MCH RBC QN AUTO: 29 PG (ref 25–34)
MCHC RBC AUTO-ENTMCNC: 31 G/DL (ref 32–36)
MCV RBC AUTO: 94 FL (ref 80–99)
MONOCYTES # BLD AUTO: 1 X 10^3 (ref 0–1)
MONOCYTES NFR BLD AUTO: 9 % (ref 0–12)
NEUTROPHILS # BLD AUTO: 7.7 X 10^3 (ref 1.8–7.8)
NEUTROPHILS NFR BLD AUTO: 71 % (ref 42–75)
PLATELET # BLD: 289 10^3/UL (ref 130–400)
PMV BLD AUTO: 9.8 FL (ref 7.4–10.4)
POTASSIUM SERPL-SCNC: 4.1 MMOL/L (ref 3.6–5)
PROT SERPL-MCNC: 4.6 GM/DL (ref 6.4–8.2)
RBC # BLD AUTO: 2.96 10^6/UL (ref 4.35–5.85)
SODIUM SERPL-SCNC: 135 MMOL/L (ref 135–145)
WBC # BLD AUTO: 10.8 10^3/UL (ref 4.3–11)

## 2018-12-13 RX ADMIN — SIMVASTATIN SCH MG: 10 TABLET, FILM COATED ORAL at 20:40

## 2018-12-13 RX ADMIN — LACTOBACILLUS ACIDOPHILUS / LACTOBACILLUS BULGARICUS SCH GM: 100 MILLION CFU STRENGTH GRANULES at 12:23

## 2018-12-13 RX ADMIN — INSULIN ASPART SCH UNIT: 100 INJECTION, SOLUTION INTRAVENOUS; SUBCUTANEOUS at 06:47

## 2018-12-13 RX ADMIN — INSULIN ASPART SCH UNIT: 100 INJECTION, SOLUTION INTRAVENOUS; SUBCUTANEOUS at 20:41

## 2018-12-13 RX ADMIN — AMLODIPINE BESYLATE SCH MG: 5 TABLET ORAL at 09:55

## 2018-12-13 RX ADMIN — POTASSIUM CHLORIDE SCH MEQ: 750 TABLET, FILM COATED, EXTENDED RELEASE ORAL at 09:56

## 2018-12-13 RX ADMIN — ALLOPURINOL SCH MG: 300 TABLET ORAL at 09:55

## 2018-12-13 RX ADMIN — INSULIN ASPART SCH UNIT: 100 INJECTION, SOLUTION INTRAVENOUS; SUBCUTANEOUS at 16:37

## 2018-12-13 RX ADMIN — METFORMIN HYDROCHLORIDE SCH MG: 500 TABLET, FILM COATED ORAL at 06:47

## 2018-12-13 RX ADMIN — OXYBUTYNIN CHLORIDE SCH MG: 5 TABLET ORAL at 12:30

## 2018-12-13 RX ADMIN — LACTOBACILLUS ACIDOPHILUS / LACTOBACILLUS BULGARICUS SCH GM: 100 MILLION CFU STRENGTH GRANULES at 06:47

## 2018-12-13 RX ADMIN — SENNOSIDES SCH MG: 8.6 TABLET, FILM COATED ORAL at 20:40

## 2018-12-13 RX ADMIN — POTASSIUM CHLORIDE SCH MEQ: 750 TABLET, FILM COATED, EXTENDED RELEASE ORAL at 20:40

## 2018-12-13 RX ADMIN — AMIODARONE HYDROCHLORIDE SCH MG: 200 TABLET ORAL at 09:56

## 2018-12-13 RX ADMIN — BETHANECHOL CHLORIDE SCH MG: 25 TABLET ORAL at 06:47

## 2018-12-13 RX ADMIN — OXYBUTYNIN CHLORIDE SCH MG: 5 TABLET ORAL at 20:40

## 2018-12-13 RX ADMIN — INSULIN ASPART SCH UNIT: 100 INJECTION, SOLUTION INTRAVENOUS; SUBCUTANEOUS at 12:23

## 2018-12-13 RX ADMIN — ALBUTEROL SULFATE SCH MG: 2.5 SOLUTION RESPIRATORY (INHALATION) at 14:46

## 2018-12-13 RX ADMIN — FAMOTIDINE SCH MG: 20 TABLET, FILM COATED ORAL at 09:56

## 2018-12-13 RX ADMIN — HALOPERIDOL LACTATE PRN MG: 5 INJECTION, SOLUTION INTRAMUSCULAR at 19:20

## 2018-12-13 RX ADMIN — BETHANECHOL CHLORIDE SCH MG: 25 TABLET ORAL at 12:23

## 2018-12-13 RX ADMIN — SODIUM CHLORIDE SCH MLS/HR: 900 INJECTION, SOLUTION INTRAVENOUS at 03:14

## 2018-12-13 RX ADMIN — ENOXAPARIN SODIUM SCH MG: 100 INJECTION SUBCUTANEOUS at 10:01

## 2018-12-13 RX ADMIN — LACTOBACILLUS ACIDOPHILUS / LACTOBACILLUS BULGARICUS SCH GM: 100 MILLION CFU STRENGTH GRANULES at 17:30

## 2018-12-13 RX ADMIN — SODIUM CHLORIDE SCH MLS/HR: 900 INJECTION INTRAVENOUS at 09:57

## 2018-12-13 RX ADMIN — PHENAZOPYRIDINE HYDROCHLORIDE SCH MG: 100 TABLET ORAL at 12:30

## 2018-12-13 RX ADMIN — METFORMIN HYDROCHLORIDE SCH MG: 500 TABLET, FILM COATED ORAL at 17:30

## 2018-12-13 RX ADMIN — RISPERIDONE SCH MG: 0.25 TABLET, FILM COATED ORAL at 20:40

## 2018-12-13 RX ADMIN — DOCUSATE SODIUM SCH MG: 100 CAPSULE ORAL at 10:00

## 2018-12-13 RX ADMIN — ALBUTEROL SULFATE SCH MG: 2.5 SOLUTION RESPIRATORY (INHALATION) at 19:08

## 2018-12-13 RX ADMIN — METOPROLOL TARTRATE SCH MG: 25 TABLET, FILM COATED ORAL at 20:40

## 2018-12-13 RX ADMIN — LACTOBACILLUS ACIDOPHILUS / LACTOBACILLUS BULGARICUS SCH GM: 100 MILLION CFU STRENGTH GRANULES at 20:40

## 2018-12-13 RX ADMIN — SODIUM CHLORIDE SCH MLS/HR: 900 INJECTION, SOLUTION INTRAVENOUS at 19:22

## 2018-12-13 RX ADMIN — PHENAZOPYRIDINE HYDROCHLORIDE SCH MG: 100 TABLET ORAL at 17:30

## 2018-12-13 RX ADMIN — DOCUSATE SODIUM SCH MG: 100 CAPSULE ORAL at 20:40

## 2018-12-13 RX ADMIN — OXYBUTYNIN CHLORIDE SCH MG: 5 TABLET ORAL at 09:56

## 2018-12-13 RX ADMIN — BETHANECHOL CHLORIDE SCH MG: 25 TABLET ORAL at 20:40

## 2018-12-13 RX ADMIN — PHENAZOPYRIDINE HYDROCHLORIDE SCH MG: 100 TABLET ORAL at 09:54

## 2018-12-13 RX ADMIN — Medication SCH EA: at 06:47

## 2018-12-13 RX ADMIN — RISPERIDONE SCH MG: 0.25 TABLET, FILM COATED ORAL at 09:55

## 2018-12-13 RX ADMIN — METOPROLOL TARTRATE SCH MG: 25 TABLET, FILM COATED ORAL at 09:56

## 2018-12-13 RX ADMIN — BETHANECHOL CHLORIDE SCH MG: 25 TABLET ORAL at 17:30

## 2018-12-13 RX ADMIN — ALBUTEROL SULFATE SCH MG: 2.5 SOLUTION RESPIRATORY (INHALATION) at 06:57

## 2018-12-13 RX ADMIN — LISINOPRIL SCH MG: 20 TABLET ORAL at 09:56

## 2018-12-13 RX ADMIN — TAMSULOSIN HYDROCHLORIDE SCH MG: 0.4 CAPSULE ORAL at 17:30

## 2018-12-13 RX ADMIN — SENNOSIDES SCH MG: 8.6 TABLET, FILM COATED ORAL at 10:00

## 2018-12-13 RX ADMIN — PIOGLITAZONE SCH MG: 30 TABLET ORAL at 06:47

## 2018-12-13 RX ADMIN — FLUCONAZOLE IN SODIUM CHLORIDE SCH MLS/HR: 2 INJECTION, SOLUTION INTRAVENOUS at 13:04

## 2018-12-13 NOTE — PHYSICAL THERAPY DAILY NOTE
PT Daily Note-Current


Subjective


Spouse requests no OOB activity this p.m. due to increase confusion and 

impulsive behavior.





Pain





   Numeric Pain Scale:  0-No Pain


   Location:  No Pain Reported





Mental Status


Patient Orientation:  Confused


Attachments:  Luis Catheter, IV


wound vac





Transfers


Therapy Code Descriptions/Definitions 





Functional [place] Measure:


0=Not Assessed/NA        4=Minimal Assistance


1=Total Assistance        5=Supervision or Setup


2=Maximal Assistance  6=Modified [place]


3=Moderate Assistance 7=Complete [place]








Therapy Quality Codes:


6    Independent with activity with or without an assistive device


5    Patient requires set up or clean up by helper.  Patient completes activity

  by  themselves


4    Supervision or touching assist (CGA). [place] provide cues , steadying 

assist


3    The helper provides less than half the effort to complete the activity


2    The helper provides more than half the effort to complete the activity


1    Dependent.  The helper does all the effort to complete an activity 


7    Patient refused to complete or attempt activity


9    The patient did not perform the activity before the current illness or 

injury


88  Not attempted due to Medical conditions or safety concerns





Weight Bearing


Right Lower Extremity:  Right


Full Weight Bearing


Left Lower Extremity:  Left


Full Weight Bearing





Exercises


Supine Ex:  Ankle pumps, Quad Set, Heel Slides, Straight leg raise, Hip abd/add


Supine Reps:  15 (3 sets AAROM)





Assessment


Patient tolerated treatment well.  Patient repositioned to slight sidelying 

left with pillow placement.  Patient continues to tolerate minimal activity.





PT Long Term Goals


Long Term Goals


PT Long Term Goals Time Frame:  Dec 29, 2018


Transfers (B,C,W/C) (FIM):  4


Sit to Lying (QC):  4


Lying-Sitting on Side/Bed(QC):  4


Sit to Stand (QC):  4


Rollin


Roll Left to Right (QC):  4


Chair/Bed-to-Chair Xfer(QC):  4


Car Transfer (QC):  4


Does the Patient Walk:  No and Walking Goal IS indicated


Gait (FIM):  1


Gait distance (FIM):  1=up to 49 ft


Distance:  15'


Walk 10 feet (QC):  4


Walk 10ft-Uneven Surface(QC):  4


Walk 50ft with 2 Turns (QC):  88


Walk 150 ft (QC):  88


Gait Level of Assist:  4


Gait Assistive Device:  FWW





PT Plan


Treatment/Plan


Treatment Plan:  Continue Plan of Care


Treatment Plan:  Bed Mobility, Education, Functional Activity Samina, Functional 

Strength, Gait, Safety, Therapeutic Exercise, Transfers


Treatment Duration:  Dec 29, 2018


Frequency:  6-11/wk as patient medical status improves


Estimated Hrs Per Day:  .5 hour per day


Patient and/or Family Agrees t:  Yes





Time/GCodes


Time In:  1451


Time Out:  1505


Total Billed Treatment Time:  14


Total Billed Treatment


1 visit


EX 14 min











DNAA CROOKS PT Dec 13, 2018 15:30

## 2018-12-13 NOTE — PROGRESS NOTE-HOSPITALIST
LISA QUIÑONES DO 12/13/18 1055:


Subjective


HPI/CC On Admission


Date Seen by Provider:  Dec 13, 2018


Time Seen by Provider:  10:00


Subjective/Events-last exam


Patient is much more alert and diminished delirium


Daughter at he bedside


Loose stools so laxatives will be held and maintained on Lactinex due to h/o C 

diff


Cefepime 7 days today so will DC


Nebs helping wheezing


IRF evaluation.





Review of Systems


General:  Fatigue


Musculoskeletal:  back pain





Objective


Exam


Vital Signs





Vital Signs








  Date Time  Temp Pulse Resp B/P (MAP) Pulse Ox O2 Delivery O2 Flow Rate FiO2


 


12/13/18 19:08     93 Nasal Cannula 2.00 


 


12/13/18 16:00 98.2 70 18 120/47 (71)    


 


12/11/18 15:33        21





Capillary Refill :


General Appearance:  No Apparent Distress, WD/WN, Chronically ill


Respiratory:  Chest Non Tender, Lungs Clear, Normal Breath Sounds, No Accessory 

Muscle Use, No Respiratory Distress


Cardiovascular:  Regular Rate, Rhythm, No Gallop, No JVD, No Murmur, Normal 

Peripheral Pulses


Extremity:  Pedal Edema


Neurologic/Psychiatric:  Alert, Oriented x3, No Motor/Sensory Deficits, Normal 

Mood/Affect


Skin:  Normal Color, Warm/Dry





Results/Procedures


Lab


Laboratory Tests


12/13/18 07:05








Patient resulted labs reviewed.





Assessment/Plan


Assessment and Plan


Assess & Plan/Chief Complaint


Assessment: 


1A) Pneumonia facility acquired on Cefepime and Nebs


1) Acute delirium


2) Pseudomonas UTI


3) Debility due to repeat surgeries


4) Intraspinal abscess


5) Urinary retention


6) Renal insufficiency 


7) C. diff colitis (resolved)


8) Diabetes





PLan:


Continue treatment


Palliative care management is appreciated since he is still very complex and 

prognosis guarded


Monitor


Monitor labs





Improved delirium


Increase PO intake


Abx DC since completed treatment





Diagnosis/Problems


Diagnosis/Problems





(1) Pneumonia


Status:  Resolved


Qualifiers:  


   Pneumonia type:  due to unspecified organism  Laterality:  bilateral  Lung 

location:  lower lobe of lung  Qualified Codes:  J18.1 - Lobar pneumonia, 

unspecified organism


Resolution Date/Time:  12/13/18 @ 20:18


(2) Wheezing


Status:  Resolved


Resolution Date/Time:  12/12/18 @ 11:37


(3) Fever


Status:  Resolved


Qualifiers:  


   Fever type:  unspecified  Qualified Codes:  R50.9 - Fever, unspecified


Resolution Date/Time:  12/12/18 @ 11:37


(4) Delirium


Status:  Resolved


Resolution Date/Time:  12/13/18 @ 20:18


(5) Pseudomonas urinary tract infection


Status:  Resolved


Resolution Date/Time:  12/13/18 @ 20:18


(6) Yeast UTI


Status:  Resolved


Resolution Date/Time:  12/13/18 @ 20:18


(7) Advanced age


Status:  Chronic


(8) Renal insufficiency


Status:  Resolved


Resolution Date/Time:  11/19/18 @ 09:41





MACHO QUINTERO MED STUDENT 12/13/18 1111:


Subjective


Subjective/Events-last exam


Patient is more alert and responding to questions this am


He is still confused at times


Family member reports diarrhea yesterday


Patient states that he is not in pain


Patients lungs sound much improved





Objective


Exam


General Appearance:  No Apparent Distress, WD/WN


Respiratory:  Chest Non Tender, Lungs Clear, Normal Breath Sounds, No Accessory 

Muscle Use, No Respiratory Distress


Cardiovascular:  Regular Rate, Rhythm, No Gallop, No JVD, No Murmur


Extremity:  Pedal Edema


Neurologic/Psychiatric:  Alert


Skin:  Normal Color, Warm/Dry





Assessment/Plan


Assessment and Plan


Assess & Plan/Chief Complaint


Assessment:


1) Acute delirium


2) Pneumonia


3) Debility due to back surgeries


4) Spinal abscess


5) Pseudomonas UTI


6) Diabetes





Plan:


1) Continue IV abx


2) Continue oral abx


3) Continue nebulizer treatments


4) PT evaluation











LISA QUIÑONES DO Dec 13, 2018 10:55


MACHO QUINTERO MED STUDENT Dec 13, 2018 11:11

## 2018-12-13 NOTE — PHYSICAL THERAPY DAILY NOTE
PT Daily Note-Current


Subjective


Patient is more alert, however, confused.





Mental Status


Patient Orientation:  Confused


Attachments:  Oxygen, Luis Catheter, IV


wound vac





Transfers


Therapy Code Descriptions/Definitions 





Functional [place] Measure:


0=Not Assessed/NA        4=Minimal Assistance


1=Total Assistance        5=Supervision or Setup


2=Maximal Assistance  6=Modified [place]


3=Moderate Assistance 7=Complete [place]








Therapy Quality Codes:


6    Independent with activity with or without an assistive device


5    Patient requires set up or clean up by helper.  Patient completes activity

  by  themselves


4    Supervision or touching assist (CGA). [place] provide cues , steadying 

assist


3    The helper provides less than half the effort to complete the activity


2    The helper provides more than half the effort to complete the activity


1    Dependent.  The helper does all the effort to complete an activity 


7    Patient refused to complete or attempt activity


9    The patient did not perform the activity before the current illness or 

injury


88  Not attempted due to Medical conditions or safety concerns


Transfers (B, C, W/C) (FIM):  1


Scootin


Rollin


Roll Left to Right (QC):  4


Supine to/from Sit:  4


Sit to/from Stand:  4


Sit to Lying (QC):  4


Sit to Stand (QC):  1


Chair/Bed-to-Chair Xfer(QC):  1


Bed to/from Chair:  1


Patient performed sit to stand to FWW x 1 set with stand x 10 seconds before 

bilateral LE "gave out" on him and he was lowered to bed.  Patient requires 

dependent assist with sit to stand and SPT bed to chair.





Weight Bearing


Right Lower Extremity:  Right


Full Weight Bearing


Left Lower Extremity:  Left


Full Weight Bearing





Exercises


Seated Therapy Exercises:  Ankle pumps, Long arc quads, Hip flexion


Seated Reps:  15 (2 sets)





Assessment


Patient is more alert, however, is confused and continues to require dependent 

assist with sit to stand and SPT.  Patient left LE continues to be weak and 

patient c/o patient, however, unable to rate due to confusion.  From a PT 

standpoint, patient will require extended care facility to continued with 

therapies secondary to inability to tolerate extensive therapies.





PT Long Term Goals


Long Term Goals


PT Long Term Goals Time Frame:  Dec 29, 2018


Transfers (B,C,W/C) (FIM):  4


Sit to Lying (QC):  4


Lying-Sitting on Side/Bed(QC):  4


Sit to Stand (QC):  4


Rollin


Roll Left to Right (QC):  4


Chair/Bed-to-Chair Xfer(QC):  4


Car Transfer (QC):  4


Does the Patient Walk:  No and Walking Goal IS indicated


Gait (FIM):  1


Gait distance (FIM):  1=up to 49 ft


Distance:  15'


Walk 10 feet (QC):  4


Walk 10ft-Uneven Surface(QC):  4


Walk 50ft with 2 Turns (QC):  88


Walk 150 ft (QC):  88


Gait Level of Assist:  4


Gait Assistive Device:  FWW





PT Plan


Treatment/Plan


Treatment Plan:  Continue Plan of Care


Treatment Plan:  Bed Mobility, Education, Functional Activity Samina, Functional 

Strength, Gait, Safety, Therapeutic Exercise, Transfers


Treatment Duration:  Dec 29, 2018


Frequency:  6-11/wk as patient medical status improves


Estimated Hrs Per Day:  .5 hour per day


Patient and/or Family Agrees t:  Yes





Time/GCodes


Time In:  1005


Time Out:  1028


Total Billed Treatment Time:  23


Total Billed Treatment


1 visit


FA 14 min


EX 9 min











DANA CROOKS PT Dec 13, 2018 11:17

## 2018-12-13 NOTE — OCCUPATIONAL THER DAILY NOTE
OT Current Status-Daily Note


Subjective


Pt alert, lying in bed.  Pt on bedpan.  Agrees to therapy.  No c/o pain at this 

time.





Mental Status/Objective


Patient Orientation:  Person, Place, Time, Situation


Therapy Code Descriptions/Definitions 





Functional [place] Measure:


0=Not Assessed/NA        4=Minimal Assistance


1=Total Assistance        5=Supervision or Setup


2=Maximal Assistance  6=Modified [place]


3=Moderate Assistance 7=Complete [place]


Attachments:  Drains, IV, Oxygen





ADL-Treatment


Pt using bedpan.  Pt able to roll side to side with min A then held self there.

  Assist to lift LE in sidelying to cleanse phyllis area/buttocks.  Assist to 

cleanse and take bed bath.  Pt able to lift UE's so nrsg could complete upper 

body bathing.  Assist to scoot up in bed x2.  After therapy, pt lying on L 

side.  Call light/phone in reach.  Family in room.  All needs met in room.


Therapy Code Descriptions/Definitions 





Functional [place] Measure:


0=Not Assessed/NA        4=Minimal Assistance


1=Total Assistance        5=Supervision or Setup


2=Maximal Assistance  6=Modified [place]


3=Moderate Assistance 7=Complete [place]








Therapy Quality Codes:


6    Independent with activity with or without an assistive device


5    Patient requires set up or clean up by helper.  Patient completes activity

  by  themselves


4    Supervision or touching assist (CGA). [place] provide cues , steadying 

assist


3    The helper provides less than half the effort to complete the activity


2    The helper provides more than half the effort to complete the activity


1    Dependent.  The helper does all the effort to complete an activity 


7    Patient refused to complete or attempt activity


9    The patient did not perform the activity before the current illness or 

injury


88  Not attempted due to Medical conditions or safety concerns


Bathing (FIM):  1


Shower/Bathe Self (QC):  1


Toileting (FIM):  1


Toileting Hygiene (QC):  1





OT Short Term Goals


Short Term Goals


Time Frame:  Dec 17, 2018


Eating(FIM):  5


Grooming(FIM):  4


Bathing(FIM):  3


Upper Body Dressing(FIM):  4


Lower Body Dressing(FIM):  3


Toileting(FIM):  3


Toilet/Commode Transfer(FIM):  3


Additional Short Term Goals:  1-Demonstrate ADL Tasks, 2-Verbalize Understanding

, 3-ImproveStrength/Samina


1=Demonstrate adherence to instructed precautions during ADL tasks.


2=Patient will verbalize/demonstrate understanding of assistive devices/

modifications for ADL.


3=Patient will improve strength/tolerance for activity to enable patient to 

perform ADL's.





OT Long Term Goals


Long Term Goals


Time Frame:  Dec 29, 2018


Eating (FIM):  6


Eating (QC):  6


Groomin


Oral Hygiene (QC):  5


Bathing(FIM):  5


Shower/Bathe Self (QC):  4


Upper Body Dressing(FIM):  5


Upper Body Dressing (QC):  4


Lower Body Dressing(FIM):  5


Lower Body Dressing (QC):  4


On/Off Footwear (QC):  5


Toileting(FIM):  5


Toileting Hygiene (QC):  4


Toilet/Commode Transfer(FIM):  5


Toilet/Commode Transfer (QC):  4


Shower Transfer(FIM):  4


Additional Goals:  1-Demonstrate ADL Tasks, 2-Verbalize Understanding, 3-

ImproveStrength/Samina


1=Demonstrate adherence to instructed precautions during ADL tasks.


2=Patient will verbalize/demonstrate understanding of assistive devices/

modifications for ADL.


3=Patient will improve strength/tolerance for activity to enable patient to 

perform ADL's.





OT Education/Plan


Problem List/Assessment


Pt would benefit from skilled OT to increase his independence in basic self care





Discharge Recommendations


Plan/Recommendations:  Continue POC





Treatment Plan/Plan of Care


Patient would benefit from OT for education, treatment and training to promote 

independence in ADL's, mobility, safety and/or upper extremity function for ADL'

s.


Plan of Care:  ADL Retraining, Functional Mobility, UE Funct Exercise/Act, UE 

Neuromus Re-Ed/Coord


Treatment Duration:  Dec 29, 2018


Frequency:  5 times per week


Estimated Hrs Per Day:  .5 hour per day


Agreement:  Yes


Rehab Potential:  Guarded





Time/GCodes


Start Time:  09:06


Stop Time:  09:30


Total Time Billed (hr/min):  24


Billed Treatment Time


1 visit-ADL 2 (24 min)











QUINTEN JAIME Dec 13, 2018 09:42

## 2018-12-14 VITALS — SYSTOLIC BLOOD PRESSURE: 108 MMHG | DIASTOLIC BLOOD PRESSURE: 71 MMHG

## 2018-12-14 VITALS — SYSTOLIC BLOOD PRESSURE: 160 MMHG | DIASTOLIC BLOOD PRESSURE: 64 MMHG

## 2018-12-14 LAB
ALBUMIN SERPL-MCNC: 2.4 GM/DL (ref 3.2–4.5)
ALP SERPL-CCNC: 187 U/L (ref 40–136)
ALT SERPL-CCNC: 45 U/L (ref 0–55)
BASOPHILS # BLD AUTO: 0 10^3/UL (ref 0–0.1)
BASOPHILS NFR BLD AUTO: 0 % (ref 0–10)
BILIRUB SERPL-MCNC: 0.6 MG/DL (ref 0.1–1)
BUN/CREAT SERPL: 10
CALCIUM SERPL-MCNC: 7.9 MG/DL (ref 8.5–10.1)
CHLORIDE SERPL-SCNC: 107 MMOL/L (ref 98–107)
CO2 SERPL-SCNC: 22 MMOL/L (ref 21–32)
CREAT SERPL-MCNC: 1.25 MG/DL (ref 0.6–1.3)
EOSINOPHIL # BLD AUTO: 0.6 10^3/UL (ref 0–0.3)
EOSINOPHIL NFR BLD AUTO: 6 % (ref 0–10)
ERYTHROCYTE [DISTWIDTH] IN BLOOD BY AUTOMATED COUNT: 17.7 % (ref 10–14.5)
GFR SERPLBLD BASED ON 1.73 SQ M-ARVRAT: 56 ML/MIN
GLUCOSE SERPL-MCNC: 102 MG/DL (ref 70–105)
HCT VFR BLD CALC: 28 % (ref 40–54)
HGB BLD-MCNC: 8.7 G/DL (ref 13.3–17.7)
LYMPHOCYTES # BLD AUTO: 1.4 X 10^3 (ref 1–4)
LYMPHOCYTES NFR BLD AUTO: 14 % (ref 12–44)
MANUAL DIFFERENTIAL PERFORMED BLD QL: NO
MCH RBC QN AUTO: 29 PG (ref 25–34)
MCHC RBC AUTO-ENTMCNC: 31 G/DL (ref 32–36)
MCV RBC AUTO: 94 FL (ref 80–99)
MONOCYTES # BLD AUTO: 1 X 10^3 (ref 0–1)
MONOCYTES NFR BLD AUTO: 10 % (ref 0–12)
NEUTROPHILS # BLD AUTO: 7.2 X 10^3 (ref 1.8–7.8)
NEUTROPHILS NFR BLD AUTO: 70 % (ref 42–75)
PLATELET # BLD: 320 10^3/UL (ref 130–400)
PMV BLD AUTO: 9.8 FL (ref 7.4–10.4)
POTASSIUM SERPL-SCNC: 3.8 MMOL/L (ref 3.6–5)
PROT SERPL-MCNC: 4.9 GM/DL (ref 6.4–8.2)
RBC # BLD AUTO: 2.97 10^6/UL (ref 4.35–5.85)
SODIUM SERPL-SCNC: 135 MMOL/L (ref 135–145)
WBC # BLD AUTO: 10.3 10^3/UL (ref 4.3–11)

## 2018-12-14 RX ADMIN — INSULIN ASPART SCH UNIT: 100 INJECTION, SOLUTION INTRAVENOUS; SUBCUTANEOUS at 11:11

## 2018-12-14 RX ADMIN — METOPROLOL TARTRATE SCH MG: 25 TABLET, FILM COATED ORAL at 22:00

## 2018-12-14 RX ADMIN — BETHANECHOL CHLORIDE SCH MG: 25 TABLET ORAL at 11:30

## 2018-12-14 RX ADMIN — BETHANECHOL CHLORIDE SCH MG: 25 TABLET ORAL at 22:00

## 2018-12-14 RX ADMIN — PIOGLITAZONE SCH MG: 30 TABLET ORAL at 06:44

## 2018-12-14 RX ADMIN — SIMVASTATIN SCH MG: 10 TABLET, FILM COATED ORAL at 21:59

## 2018-12-14 RX ADMIN — Medication SCH EACH: at 21:59

## 2018-12-14 RX ADMIN — METFORMIN HYDROCHLORIDE SCH MG: 500 TABLET, FILM COATED ORAL at 06:43

## 2018-12-14 RX ADMIN — METFORMIN HYDROCHLORIDE SCH MG: 500 TABLET, FILM COATED ORAL at 16:11

## 2018-12-14 RX ADMIN — SODIUM CHLORIDE SCH MLS/HR: 900 INJECTION, SOLUTION INTRAVENOUS at 13:58

## 2018-12-14 RX ADMIN — PHENAZOPYRIDINE HYDROCHLORIDE SCH MG: 100 TABLET ORAL at 16:11

## 2018-12-14 RX ADMIN — ALLOPURINOL SCH MG: 300 TABLET ORAL at 08:42

## 2018-12-14 RX ADMIN — OXYBUTYNIN CHLORIDE SCH MG: 5 TABLET ORAL at 21:59

## 2018-12-14 RX ADMIN — SENNOSIDES SCH MG: 8.6 TABLET, FILM COATED ORAL at 08:42

## 2018-12-14 RX ADMIN — AMLODIPINE BESYLATE SCH MG: 5 TABLET ORAL at 08:42

## 2018-12-14 RX ADMIN — DOCUSATE SODIUM SCH MG: 100 CAPSULE ORAL at 22:00

## 2018-12-14 RX ADMIN — LACTOBACILLUS ACIDOPHILUS / LACTOBACILLUS BULGARICUS SCH GM: 100 MILLION CFU STRENGTH GRANULES at 06:44

## 2018-12-14 RX ADMIN — INSULIN ASPART SCH UNIT: 100 INJECTION, SOLUTION INTRAVENOUS; SUBCUTANEOUS at 23:24

## 2018-12-14 RX ADMIN — BETHANECHOL CHLORIDE SCH MG: 25 TABLET ORAL at 16:11

## 2018-12-14 RX ADMIN — ENOXAPARIN SODIUM SCH MG: 100 INJECTION SUBCUTANEOUS at 10:32

## 2018-12-14 RX ADMIN — INSULIN ASPART SCH UNIT: 100 INJECTION, SOLUTION INTRAVENOUS; SUBCUTANEOUS at 16:11

## 2018-12-14 RX ADMIN — FAMOTIDINE SCH MG: 20 TABLET, FILM COATED ORAL at 08:42

## 2018-12-14 RX ADMIN — BETHANECHOL CHLORIDE SCH MG: 25 TABLET ORAL at 06:44

## 2018-12-14 RX ADMIN — POTASSIUM CHLORIDE SCH MEQ: 750 TABLET, FILM COATED, EXTENDED RELEASE ORAL at 22:00

## 2018-12-14 RX ADMIN — INSULIN ASPART SCH UNIT: 100 INJECTION, SOLUTION INTRAVENOUS; SUBCUTANEOUS at 05:52

## 2018-12-14 RX ADMIN — PHENAZOPYRIDINE HYDROCHLORIDE SCH MG: 100 TABLET ORAL at 08:41

## 2018-12-14 RX ADMIN — SODIUM CHLORIDE SCH MG: 900 INJECTION, SOLUTION INTRAVENOUS at 08:46

## 2018-12-14 RX ADMIN — OXYBUTYNIN CHLORIDE SCH MG: 5 TABLET ORAL at 13:58

## 2018-12-14 RX ADMIN — Medication SCH EACH: at 16:11

## 2018-12-14 RX ADMIN — METOPROLOL TARTRATE SCH MG: 25 TABLET, FILM COATED ORAL at 08:41

## 2018-12-14 RX ADMIN — POTASSIUM CHLORIDE SCH MEQ: 750 TABLET, FILM COATED, EXTENDED RELEASE ORAL at 08:41

## 2018-12-14 RX ADMIN — SENNOSIDES SCH MG: 8.6 TABLET, FILM COATED ORAL at 21:59

## 2018-12-14 RX ADMIN — AMIODARONE HYDROCHLORIDE SCH MG: 200 TABLET ORAL at 08:42

## 2018-12-14 RX ADMIN — RISPERIDONE SCH MG: 0.25 TABLET, FILM COATED ORAL at 08:42

## 2018-12-14 RX ADMIN — Medication SCH EACH: at 11:30

## 2018-12-14 RX ADMIN — LISINOPRIL SCH MG: 20 TABLET ORAL at 08:42

## 2018-12-14 RX ADMIN — PHENAZOPYRIDINE HYDROCHLORIDE SCH MG: 100 TABLET ORAL at 13:58

## 2018-12-14 RX ADMIN — TAMSULOSIN HYDROCHLORIDE SCH MG: 0.4 CAPSULE ORAL at 16:11

## 2018-12-14 RX ADMIN — RISPERIDONE SCH MG: 0.25 TABLET, FILM COATED ORAL at 22:00

## 2018-12-14 RX ADMIN — SODIUM CHLORIDE SCH MLS/HR: 900 INJECTION, SOLUTION INTRAVENOUS at 10:33

## 2018-12-14 RX ADMIN — OXYBUTYNIN CHLORIDE SCH MG: 5 TABLET ORAL at 08:43

## 2018-12-14 RX ADMIN — DOCUSATE SODIUM SCH MG: 100 CAPSULE ORAL at 08:41

## 2018-12-14 RX ADMIN — Medication SCH EA: at 06:43

## 2018-12-14 RX ADMIN — ALBUTEROL SULFATE SCH MG: 2.5 SOLUTION RESPIRATORY (INHALATION) at 07:17

## 2018-12-14 NOTE — PROGRESS NOTE-HOSPITALIST
Subjective


HPI/CC On Admission


Date Seen by Provider:  Dec 14, 2018


Time Seen by Provider:  10:30


Subjective/Events-last exam


Patient doing well but confusion comes and goes


Overall very debilitated and now looking at swing bed in [place] close to home


Last day of vancomycin will be 1/11/19 to complete 6 weeks


Cefepime discontinued yesterday since completed treatment for pseudomonas UTI 

and pneumonia


Lungs are clear today and changing nebulizer treatments to as needed twice daily


Pain is under control


Bowels are moving


Updated wife


Will monitor closely


Wound VAC in place





Review of Systems


General:  Fatigue





Objective


Exam


Vital Signs





Vital Signs








  Date Time  Temp Pulse Resp B/P (MAP) Pulse Ox O2 Delivery O2 Flow Rate FiO2


 


12/14/18 10:04     93 Nasal Cannula 1.00 


 


12/14/18 06:53 98.8 77 20 108/71 (83)    


 


12/11/18 15:33        21





Capillary Refill :


General Appearance:  No Apparent Distress, WD/WN, Chronically ill


Respiratory:  Chest Non Tender, Lungs Clear, Normal Breath Sounds, No Accessory 

Muscle Use, No Respiratory Distress


Cardiovascular:  Regular Rate, Rhythm, No Edema, No Gallop, No JVD, No Murmur, 

Normal Peripheral Pulses


Neurologic/Psychiatric:  Alert, No Motor/Sensory Deficits, Normal Mood/Affect, 

Disoriented (but improved)





Results/Procedures


Lab


Laboratory Tests


12/14/18 07:07








Patient resulted labs reviewed.





Assessment/Plan


Assessment and Plan


Assess & Plan/Chief Complaint


Assessment: 


1A) s/p completed treatment for Pneumonia facility acquired  


1) Acute delirium improved


2) s/p Pseudomonas UTI completed treatment


3) Debility due to repeat surgeries


4) Intraspinal abscess


5) Urinary retention maintained on cath


6) Renal insufficiency stable


7) C. diff colitis (resolved)


8) Diabetes





PLan:


Continue treatment


Monitor


Monitor labs





Improved delirium


Increase PO intake


Abx DC since completed treatment


Swing bed [place]?





Diagnosis/Problems


Diagnosis/Problems





(1) Pneumonia


Status:  Resolved


Qualifiers:  


   Pneumonia type:  due to unspecified organism  Laterality:  bilateral  Lung 

location:  lower lobe of lung  Qualified Codes:  J18.1 - Lobar pneumonia, 

unspecified organism


Resolution Date/Time:  12/13/18 @ 20:18


(2) Wheezing


Status:  Resolved


Resolution Date/Time:  12/12/18 @ 11:37


(3) Fever


Status:  Resolved


Qualifiers:  


   Fever type:  unspecified  Qualified Codes:  R50.9 - Fever, unspecified


Resolution Date/Time:  12/12/18 @ 11:37


(4) Delirium


Status:  Resolved


Resolution Date/Time:  12/13/18 @ 20:18


(5) Pseudomonas urinary tract infection


Status:  Resolved


Resolution Date/Time:  12/13/18 @ 20:18


(6) Yeast UTI


Status:  Resolved


Resolution Date/Time:  12/13/18 @ 20:18


(7) Advanced age


Status:  Chronic


(8) Renal insufficiency


Status:  Resolved


Resolution Date/Time:  11/19/18 @ 09:41











LISA QUIÑONES DO Dec 14, 2018 08:28

## 2018-12-14 NOTE — OCC THERAPY PROGRESS NOTE
Therapy Progress Note


5200   Pt's wife reported that he just finished getting wound vac changed and 

was too tired for therapy. She did mention that he fed himself his lunch with 

only setup. Will continue to follow. 





visit











FERNANDO SMITH OT Dec 14, 2018 15:14

## 2018-12-14 NOTE — PHYSICAL THERAPY PROGRESS NOTE
Therapy Progress Note


Patient's family request no treatment this p.m. due to patient is resting 

comfortable. PT to resume in DANA Ragsdale PT Dec 14, 2018 14:47

## 2018-12-14 NOTE — PHYSICAL THERAPY DAILY NOTE
PT Daily Note-Current


Subjective


Patient more alert on this date.  Spouse present.





Pain





   Numeric Pain Scale:  0-No Pain


   Location:  No Pain Reported





Mental Status


Patient Orientation:  Confused


Attachments:  Oxygen, Luis Catheter, IV


wound vac





Transfers


Therapy Code Descriptions/Definitions 





Functional [place] Measure:


0=Not Assessed/NA        4=Minimal Assistance


1=Total Assistance        5=Supervision or Setup


2=Maximal Assistance  6=Modified [place]


3=Moderate Assistance 7=Complete [place]








Therapy Quality Codes:


6    Independent with activity with or without an assistive device


5    Patient requires set up or clean up by helper.  Patient completes activity

  by  themselves


4    Supervision or touching assist (CGA). [place] provide cues , steadying 

assist


3    The helper provides less than half the effort to complete the activity


2    The helper provides more than half the effort to complete the activity


1    Dependent.  The helper does all the effort to complete an activity 


7    Patient refused to complete or attempt activity


9    The patient did not perform the activity before the current illness or 

injury


88  Not attempted due to Medical conditions or safety concerns


Transfers (B, C, W/C) (FIM):  2


Scootin


Rollin


Roll Left to Right (QC):  2


Supine to/from Sit:  4


Sit to/from Stand:  2


Sit to Lying (QC):  2


Sit to Stand (QC):  2


Chair/Bed-to-Chair Xfer(QC):  2


Bed to/from Chair:  2


Patient performed sit to stand to FWW x 5 sets with max assist x 2.  Noted left 

LE continues to remain weak.





Weight Bearing


Right Lower Extremity:  Right


Full Weight Bearing


Left Lower Extremity:  Left


Full Weight Bearing





Gait Training


Does the Patient Walk?:  No and Walking Goal IS indicated


Gait (FIM):  1


Distance (FIM):  1=up to 49 ft


Distance:  5 steps


Gait Level of Assist:  2


Gait Persons Needed:  2


Gait Assistive Device:  FWW


patient performed side stepping with FWW and max assist x 2 bed to chair





Exercises


Supine Ex:  Ankle pumps, Quad Set


Supine Reps:  15


Seated Therapy Exercises:  Ankle pumps, Long arc quads


Seated Reps:  15





Assessment


Patient tolerated treatment well and is up in recliner with needs met.  Patient 

slowly improving with gross motor skills.





PT Long Term Goals


Long Term Goals


PT Long Term Goals Time Frame:  Dec 29, 2018


Transfers (B,C,W/C) (FIM):  4


Sit to Lying (QC):  4


Lying-Sitting on Side/Bed(QC):  4


Sit to Stand (QC):  4


Rollin


Roll Left to Right (QC):  4


Chair/Bed-to-Chair Xfer(QC):  4


Car Transfer (QC):  4


Does the Patient Walk:  No and Walking Goal IS indicated


Gait (FIM):  1


Gait distance (FIM):  1=up to 49 ft


Distance:  15'


Walk 10 feet (QC):  4


Walk 10ft-Uneven Surface(QC):  4


Walk 50ft with 2 Turns (QC):  88


Walk 150 ft (QC):  88


Gait Level of Assist:  4


Gait Assistive Device:  FWW





PT Plan


Treatment/Plan


Treatment Plan:  Continue Plan of Care


Treatment Plan:  Bed Mobility, Education, Functional Activity Samina, Functional 

Strength, Gait, Safety, Therapeutic Exercise, Transfers


Treatment Duration:  Dec 29, 2018


Frequency:  6-11/wk as patient medical status improves


Estimated Hrs Per Day:  .5 hour per day


Patient and/or Family Agrees t:  Yes





Time/GCodes


Time In:  1112


Time Out:  1135


Total Billed Treatment Time:  23


Total Billed Treatment


1 visit


FA 15 min


EX 8 min











DANA CROOKS PT Dec 14, 2018 11:40

## 2018-12-15 VITALS — SYSTOLIC BLOOD PRESSURE: 157 MMHG | DIASTOLIC BLOOD PRESSURE: 66 MMHG

## 2018-12-15 VITALS — SYSTOLIC BLOOD PRESSURE: 184 MMHG | DIASTOLIC BLOOD PRESSURE: 62 MMHG

## 2018-12-15 VITALS — SYSTOLIC BLOOD PRESSURE: 140 MMHG | DIASTOLIC BLOOD PRESSURE: 65 MMHG

## 2018-12-15 RX ADMIN — Medication SCH EACH: at 16:53

## 2018-12-15 RX ADMIN — METOPROLOL TARTRATE SCH MG: 25 TABLET, FILM COATED ORAL at 07:56

## 2018-12-15 RX ADMIN — PIOGLITAZONE SCH MG: 30 TABLET ORAL at 06:20

## 2018-12-15 RX ADMIN — RISPERIDONE SCH MG: 0.25 TABLET, FILM COATED ORAL at 22:11

## 2018-12-15 RX ADMIN — INSULIN ASPART SCH UNIT: 100 INJECTION, SOLUTION INTRAVENOUS; SUBCUTANEOUS at 05:22

## 2018-12-15 RX ADMIN — BETHANECHOL CHLORIDE SCH MG: 25 TABLET ORAL at 16:53

## 2018-12-15 RX ADMIN — BISACODYL PRN MG: 5 TABLET, COATED ORAL at 08:00

## 2018-12-15 RX ADMIN — SODIUM CHLORIDE SCH MLS/HR: 900 INJECTION, SOLUTION INTRAVENOUS at 16:54

## 2018-12-15 RX ADMIN — TAMSULOSIN HYDROCHLORIDE SCH MG: 0.4 CAPSULE ORAL at 16:54

## 2018-12-15 RX ADMIN — METFORMIN HYDROCHLORIDE SCH MG: 500 TABLET, FILM COATED ORAL at 06:20

## 2018-12-15 RX ADMIN — Medication SCH EA: at 06:21

## 2018-12-15 RX ADMIN — AMIODARONE HYDROCHLORIDE SCH MG: 200 TABLET ORAL at 08:00

## 2018-12-15 RX ADMIN — OXYBUTYNIN CHLORIDE SCH MG: 5 TABLET ORAL at 14:26

## 2018-12-15 RX ADMIN — PHENAZOPYRIDINE HYDROCHLORIDE SCH MG: 100 TABLET ORAL at 14:26

## 2018-12-15 RX ADMIN — BETHANECHOL CHLORIDE SCH MG: 25 TABLET ORAL at 06:21

## 2018-12-15 RX ADMIN — METOPROLOL TARTRATE SCH MG: 25 TABLET, FILM COATED ORAL at 22:14

## 2018-12-15 RX ADMIN — PHENAZOPYRIDINE HYDROCHLORIDE SCH MG: 100 TABLET ORAL at 16:54

## 2018-12-15 RX ADMIN — SENNOSIDES SCH MG: 8.6 TABLET, FILM COATED ORAL at 22:11

## 2018-12-15 RX ADMIN — FAMOTIDINE SCH MG: 20 TABLET, FILM COATED ORAL at 07:54

## 2018-12-15 RX ADMIN — HALOPERIDOL LACTATE PRN MG: 5 INJECTION, SOLUTION INTRAMUSCULAR at 16:08

## 2018-12-15 RX ADMIN — METFORMIN HYDROCHLORIDE SCH MG: 500 TABLET, FILM COATED ORAL at 16:54

## 2018-12-15 RX ADMIN — OXYBUTYNIN CHLORIDE SCH MG: 5 TABLET ORAL at 07:54

## 2018-12-15 RX ADMIN — PHENAZOPYRIDINE HYDROCHLORIDE SCH MG: 100 TABLET ORAL at 08:00

## 2018-12-15 RX ADMIN — SENNOSIDES SCH MG: 8.6 TABLET, FILM COATED ORAL at 07:54

## 2018-12-15 RX ADMIN — INSULIN ASPART SCH UNIT: 100 INJECTION, SOLUTION INTRAVENOUS; SUBCUTANEOUS at 11:03

## 2018-12-15 RX ADMIN — Medication SCH EACH: at 22:13

## 2018-12-15 RX ADMIN — DOCUSATE SODIUM SCH MG: 100 CAPSULE ORAL at 07:59

## 2018-12-15 RX ADMIN — OXYBUTYNIN CHLORIDE SCH MG: 5 TABLET ORAL at 22:10

## 2018-12-15 RX ADMIN — SIMVASTATIN SCH MG: 10 TABLET, FILM COATED ORAL at 22:14

## 2018-12-15 RX ADMIN — RISPERIDONE SCH MG: 0.25 TABLET, FILM COATED ORAL at 08:05

## 2018-12-15 RX ADMIN — ALLOPURINOL SCH MG: 300 TABLET ORAL at 07:55

## 2018-12-15 RX ADMIN — POTASSIUM CHLORIDE SCH MEQ: 750 TABLET, FILM COATED, EXTENDED RELEASE ORAL at 22:14

## 2018-12-15 RX ADMIN — INSULIN ASPART SCH UNIT: 100 INJECTION, SOLUTION INTRAVENOUS; SUBCUTANEOUS at 16:12

## 2018-12-15 RX ADMIN — BETHANECHOL CHLORIDE SCH MG: 25 TABLET ORAL at 11:03

## 2018-12-15 RX ADMIN — SODIUM CHLORIDE SCH MLS/HR: 900 INJECTION, SOLUTION INTRAVENOUS at 00:34

## 2018-12-15 RX ADMIN — POTASSIUM CHLORIDE SCH MEQ: 750 TABLET, FILM COATED, EXTENDED RELEASE ORAL at 07:56

## 2018-12-15 RX ADMIN — LISINOPRIL SCH MG: 20 TABLET ORAL at 07:54

## 2018-12-15 RX ADMIN — Medication SCH EACH: at 11:03

## 2018-12-15 RX ADMIN — DOCUSATE SODIUM SCH MG: 100 CAPSULE ORAL at 22:11

## 2018-12-15 RX ADMIN — AMLODIPINE BESYLATE SCH MG: 5 TABLET ORAL at 08:00

## 2018-12-15 RX ADMIN — Medication SCH EACH: at 06:21

## 2018-12-15 RX ADMIN — INSULIN ASPART SCH UNIT: 100 INJECTION, SOLUTION INTRAVENOUS; SUBCUTANEOUS at 22:16

## 2018-12-15 RX ADMIN — ENOXAPARIN SODIUM SCH MG: 100 INJECTION SUBCUTANEOUS at 11:03

## 2018-12-15 RX ADMIN — BETHANECHOL CHLORIDE SCH MG: 25 TABLET ORAL at 22:10

## 2018-12-15 NOTE — PHYSICAL THERAPY DAILY NOTE
PT Daily Note-Current


Subjective


Pt L sidelying upon arrival.  SP present.  Pt agrees to PT.





Mental Status


Patient Orientation:  Person, Place, Situation


Attachments:  Oxygen, Other-See Comments (Wound Vac.)





Transfers


Therapy Code Descriptions/Definitions 





Functional [place] Measure:


0=Not Assessed/NA        4=Minimal Assistance


1=Total Assistance        5=Supervision or Setup


2=Maximal Assistance  6=Modified [place]


3=Moderate Assistance 7=Complete [place]








Therapy Quality Codes:


6    Independent with activity with or without an assistive device


5    Patient requires set up or clean up by helper.  Patient completes activity

  by  themselves


4    Supervision or touching assist (CGA). [place] provide cues , steadying 

assist


3    The helper provides less than half the effort to complete the activity


2    The helper provides more than half the effort to complete the activity


1    Dependent.  The helper does all the effort to complete an activity 


7    Patient refused to complete or attempt activity


9    The patient did not perform the activity before the current illness or 

injury


88  Not attempted due to Medical conditions or safety concerns


Scootin


Supine to/from Sit:  4


Sit to/from Stand:  1


Sit to Stand (QC):  1





Weight Bearing


Right Lower Extremity:  Right


Full Weight Bearing


Left Lower Extremity:  Left


Full Weight Bearing





Gait Training


Does the Patient Walk?:  Yes


Distance (FIM):  1=up to 49 ft


Distance:  5'


Gait Level of Assist:  1


Gait Persons Needed:  1


Gait Assistive Device:  FWW





Exercises


Seated Therapy Exercises:  Ankle pumps, Long arc quads, Hip flexion, Kicking 

activity


Seated Reps:  15





Treatments


Pt transfer to EOB before PTA & Tech assist pt to standing using FWW.  Pt 

ambulates to nearby recliner to catch breathe.  Pt completes Seated Ex in 

recliner.  Pt has all needs met at end of tx.





Assessment


Current Status:  Fair Progress


Pt continues to demonstrate weakness with sit to stand and ambulation.





PT Long Term Goals


Long Term Goals


PT Long Term Goals Time Frame:  Dec 29, 2018


Transfers (B,C,W/C) (FIM):  4


Sit to Lying (QC):  4


Lying-Sitting on Side/Bed(QC):  4


Sit to Stand (QC):  4


Rollin


Roll Left to Right (QC):  4


Chair/Bed-to-Chair Xfer(QC):  4


Car Transfer (QC):  4


Does the Patient Walk:  No and Walking Goal IS indicated


Gait (FIM):  1


Gait distance (FIM):  1=up to 49 ft


Distance:  15'


Walk 10 feet (QC):  4


Walk 10ft-Uneven Surface(QC):  4


Walk 50ft with 2 Turns (QC):  88


Walk 150 ft (QC):  88


Gait Level of Assist:  4


Gait Assistive Device:  FWW





PT Plan


Problem List


Problem List:  Activity Tolerance, Functional Strength, Safety, Balance, Gait, 

Transfer





Treatment/Plan


Treatment Plan:  Continue Plan of Care


Treatment Plan:  Bed Mobility, Education, Functional Activity Samina, Functional 

Strength, Gait, Safety, Therapeutic Exercise, Transfers


Treatment Duration:  Dec 29, 2018


Frequency:  6-11/wk as patient medical status improves


Estimated Hrs Per Day:  .5 hour per day


Patient and/or Family Agrees t:  Yes





Safety Risks/Education


Patient Education:  Gait Training, Transfer Techniques, Correct Positioning, 

Safety Issues


Teaching Recipient:  Patient


Teaching Methods:  Discussion


Response to Teaching:  Verbalize Understanding





Time/GCodes


Time In:  1100


Time Out:  1120


Total Billed Treatment Time:  20


Total Billed Treatment


1, FA (20m)


G Codes Necessary:  FREDDIE Longo PTA Dec 15, 2018 12:14

## 2018-12-15 NOTE — PROGRESS NOTE-HOSPITALIST
Subjective


HPI/CC On Admission


Date Seen by Provider:  Dec 15, 2018


Time Seen by Provider:  10:30


Subjective/Events-last exam


Patient overall improved


Less confused last night


Bowels moved 2 days ago and he was given BM regimen today


Pain is well controlled


Wound vac in place


Labs reviewed


Swing bed [place] likely the best for the patient





Review of Systems


Gastrointestinal:  Constipation





Objective


Exam


Vital Signs





Vital Signs








  Date Time  Temp Pulse Resp B/P (MAP) Pulse Ox O2 Delivery O2 Flow Rate FiO2


 


12/15/18 09:00      Nasal Cannula 1.00 


 


12/15/18 08:30 99.8 71 19 140/65 (90) 95   


 


12/11/18 15:33        21





Capillary Refill :


General Appearance:  No Apparent Distress, WD/WN, Chronically ill


Respiratory:  Chest Non Tender, Lungs Clear, Normal Breath Sounds, No Accessory 

Muscle Use, No Respiratory Distress


Cardiovascular:  Regular Rate, Rhythm, No Edema, No Gallop, No JVD, No Murmur, 

Normal Peripheral Pulses


Neurologic/Psychiatric:  Alert, Oriented x3, No Motor/Sensory Deficits, Normal 

Mood/Affect





Results/Procedures


Lab


Patient resulted labs reviewed.





Assessment/Plan


Assessment and Plan


Assess & Plan/Chief Complaint


Assessment: 


1A) s/p completed treatment for Pneumonia facility acquired  


1) Acute delirium improved


2) s/p Pseudomonas UTI completed treatment


3) Debility due to repeat surgeries


4) Intraspinal abscess


5) Urinary retention maintained on cath


6) Renal insufficiency stable


7) C. diff colitis (resolved)


8) Diabetes





PLan:


Continue treatment


Monitor


Monitor labs





Improved delirium


Increase PO intake


Abx DC since completed treatment


Swing bed [place]?





Diagnosis/Problems


Diagnosis/Problems





(1) Pneumonia


Status:  Resolved


Qualifiers:  


   Pneumonia type:  due to unspecified organism  Laterality:  bilateral  Lung 

location:  lower lobe of lung  Qualified Codes:  J18.1 - Lobar pneumonia, 

unspecified organism


Resolution Date/Time:  12/13/18 @ 20:18


(2) Wheezing


Status:  Resolved


Resolution Date/Time:  12/12/18 @ 11:37


(3) Fever


Status:  Resolved


Qualifiers:  


   Fever type:  unspecified  Qualified Codes:  R50.9 - Fever, unspecified


Resolution Date/Time:  12/12/18 @ 11:37


(4) Delirium


Status:  Resolved


Resolution Date/Time:  12/13/18 @ 20:18


(5) Pseudomonas urinary tract infection


Status:  Resolved


Resolution Date/Time:  12/13/18 @ 20:18


(6) Yeast UTI


Status:  Resolved


Resolution Date/Time:  12/13/18 @ 20:18


(7) Advanced age


Status:  Chronic


(8) Renal insufficiency


Status:  Resolved


Resolution Date/Time:  11/19/18 @ 09:41











LISA QUIÑONES DO Dec 15, 2018 11:39

## 2018-12-16 VITALS — SYSTOLIC BLOOD PRESSURE: 137 MMHG | DIASTOLIC BLOOD PRESSURE: 65 MMHG

## 2018-12-16 VITALS — DIASTOLIC BLOOD PRESSURE: 65 MMHG | SYSTOLIC BLOOD PRESSURE: 150 MMHG

## 2018-12-16 VITALS — SYSTOLIC BLOOD PRESSURE: 160 MMHG | DIASTOLIC BLOOD PRESSURE: 70 MMHG

## 2018-12-16 VITALS — SYSTOLIC BLOOD PRESSURE: 143 MMHG | DIASTOLIC BLOOD PRESSURE: 62 MMHG

## 2018-12-16 RX ADMIN — AMLODIPINE BESYLATE SCH MG: 5 TABLET ORAL at 09:04

## 2018-12-16 RX ADMIN — BETHANECHOL CHLORIDE SCH MG: 25 TABLET ORAL at 17:18

## 2018-12-16 RX ADMIN — SODIUM CHLORIDE SCH MLS/HR: 900 INJECTION, SOLUTION INTRAVENOUS at 22:23

## 2018-12-16 RX ADMIN — METFORMIN HYDROCHLORIDE SCH MG: 500 TABLET, FILM COATED ORAL at 17:18

## 2018-12-16 RX ADMIN — OXYBUTYNIN CHLORIDE SCH MG: 5 TABLET ORAL at 13:27

## 2018-12-16 RX ADMIN — TAMSULOSIN HYDROCHLORIDE SCH MG: 0.4 CAPSULE ORAL at 17:18

## 2018-12-16 RX ADMIN — PHENAZOPYRIDINE HYDROCHLORIDE SCH MG: 100 TABLET ORAL at 09:03

## 2018-12-16 RX ADMIN — DOCUSATE SODIUM SCH MG: 100 CAPSULE ORAL at 21:54

## 2018-12-16 RX ADMIN — SIMVASTATIN SCH MG: 10 TABLET, FILM COATED ORAL at 21:55

## 2018-12-16 RX ADMIN — Medication SCH EACH: at 17:17

## 2018-12-16 RX ADMIN — SODIUM CHLORIDE SCH MLS/HR: 900 INJECTION, SOLUTION INTRAVENOUS at 10:01

## 2018-12-16 RX ADMIN — SENNOSIDES SCH MG: 8.6 TABLET, FILM COATED ORAL at 09:05

## 2018-12-16 RX ADMIN — ENOXAPARIN SODIUM SCH MG: 100 INJECTION SUBCUTANEOUS at 10:01

## 2018-12-16 RX ADMIN — BETHANECHOL CHLORIDE SCH MG: 25 TABLET ORAL at 11:32

## 2018-12-16 RX ADMIN — BISACODYL PRN MG: 5 TABLET, COATED ORAL at 09:06

## 2018-12-16 RX ADMIN — BETHANECHOL CHLORIDE SCH MG: 25 TABLET ORAL at 06:33

## 2018-12-16 RX ADMIN — OXYBUTYNIN CHLORIDE SCH MG: 5 TABLET ORAL at 21:53

## 2018-12-16 RX ADMIN — RISPERIDONE SCH MG: 0.25 TABLET, FILM COATED ORAL at 21:54

## 2018-12-16 RX ADMIN — LISINOPRIL SCH MG: 20 TABLET ORAL at 09:05

## 2018-12-16 RX ADMIN — INSULIN ASPART SCH UNIT: 100 INJECTION, SOLUTION INTRAVENOUS; SUBCUTANEOUS at 16:55

## 2018-12-16 RX ADMIN — PIOGLITAZONE SCH MG: 30 TABLET ORAL at 06:34

## 2018-12-16 RX ADMIN — Medication SCH EACH: at 21:54

## 2018-12-16 RX ADMIN — INSULIN ASPART SCH UNIT: 100 INJECTION, SOLUTION INTRAVENOUS; SUBCUTANEOUS at 06:24

## 2018-12-16 RX ADMIN — SODIUM CHLORIDE SCH MLS/HR: 900 INJECTION, SOLUTION INTRAVENOUS at 06:31

## 2018-12-16 RX ADMIN — SENNOSIDES SCH MG: 8.6 TABLET, FILM COATED ORAL at 21:54

## 2018-12-16 RX ADMIN — POTASSIUM CHLORIDE SCH MEQ: 750 TABLET, FILM COATED, EXTENDED RELEASE ORAL at 21:54

## 2018-12-16 RX ADMIN — BETHANECHOL CHLORIDE SCH MG: 25 TABLET ORAL at 21:53

## 2018-12-16 RX ADMIN — INSULIN ASPART SCH UNIT: 100 INJECTION, SOLUTION INTRAVENOUS; SUBCUTANEOUS at 21:10

## 2018-12-16 RX ADMIN — OXYBUTYNIN CHLORIDE SCH MG: 5 TABLET ORAL at 09:04

## 2018-12-16 RX ADMIN — AMIODARONE HYDROCHLORIDE SCH MG: 200 TABLET ORAL at 09:04

## 2018-12-16 RX ADMIN — Medication SCH EA: at 06:34

## 2018-12-16 RX ADMIN — PHENAZOPYRIDINE HYDROCHLORIDE SCH MG: 100 TABLET ORAL at 17:18

## 2018-12-16 RX ADMIN — RISPERIDONE SCH MG: 0.25 TABLET, FILM COATED ORAL at 09:05

## 2018-12-16 RX ADMIN — ALLOPURINOL SCH MG: 300 TABLET ORAL at 09:04

## 2018-12-16 RX ADMIN — METOPROLOL TARTRATE SCH MG: 25 TABLET, FILM COATED ORAL at 22:00

## 2018-12-16 RX ADMIN — DOCUSATE SODIUM SCH MG: 100 CAPSULE ORAL at 09:04

## 2018-12-16 RX ADMIN — FAMOTIDINE SCH MG: 20 TABLET, FILM COATED ORAL at 09:04

## 2018-12-16 RX ADMIN — POTASSIUM CHLORIDE SCH MEQ: 750 TABLET, FILM COATED, EXTENDED RELEASE ORAL at 09:04

## 2018-12-16 RX ADMIN — METFORMIN HYDROCHLORIDE SCH MG: 500 TABLET, FILM COATED ORAL at 06:33

## 2018-12-16 RX ADMIN — Medication SCH EACH: at 11:32

## 2018-12-16 RX ADMIN — SODIUM CHLORIDE SCH MG: 900 INJECTION, SOLUTION INTRAVENOUS at 09:03

## 2018-12-16 RX ADMIN — INSULIN ASPART SCH UNIT: 100 INJECTION, SOLUTION INTRAVENOUS; SUBCUTANEOUS at 11:14

## 2018-12-16 RX ADMIN — Medication SCH EACH: at 06:34

## 2018-12-16 RX ADMIN — METOPROLOL TARTRATE SCH MG: 25 TABLET, FILM COATED ORAL at 09:04

## 2018-12-16 RX ADMIN — PHENAZOPYRIDINE HYDROCHLORIDE SCH MG: 100 TABLET ORAL at 13:27

## 2018-12-16 NOTE — PROGRESS NOTE-HOSPITALIST
Subjective


HPI/CC On Admission


Date Seen by Provider:  Dec 16, 2018


Time Seen by Provider:  11:00


Subjective/Events-last exam


Patient doing well today


Overall has no issues except did have confusion and a bit combative with wife 

so Haldol was given


Scheduled Risperdal still successful for the patient


Overall doing well


[place] swing bed is a good option for tomorrow





Review of Systems


General:  Fatigue





Objective


Exam


Vital Signs





Vital Signs








  Date Time  Temp Pulse Resp B/P (MAP) Pulse Ox O2 Delivery O2 Flow Rate FiO2


 


12/16/18 09:00      Nasal Cannula 1.00 


 


12/16/18 08:00 97.5 66 20 137/65 (89) 93   


 


12/11/18 15:33        21





Capillary Refill :


General Appearance:  No Apparent Distress, WD/WN, Chronically ill


Respiratory:  Chest Non Tender, Lungs Clear, Normal Breath Sounds, No Accessory 

Muscle Use, No Respiratory Distress


Cardiovascular:  Regular Rate, Rhythm, No Edema, No Gallop, No JVD, No Murmur, 

Normal Peripheral Pulses


Neurologic/Psychiatric:  Alert, No Motor/Sensory Deficits, Normal Mood/Affect, 

Disoriented





Results/Procedures


Lab


Patient resulted labs reviewed.





Assessment/Plan


Assessment and Plan


Assess & Plan/Chief Complaint


Assessment: 


1A) s/p completed treatment for Pneumonia facility acquired  


1) Acute delirium improved


2) s/p Pseudomonas UTI completed treatment


3) Debility due to repeat surgeries


4) Intraspinal abscess


5) Urinary retention maintained on cath


6) Renal insufficiency stable


7) C. diff colitis (resolved)


8) Diabetes





PLan:


Continue treatment


Monitor


Monitor labs





Improved delirium


Increase PO intake


Abx DC since completed treatment


Swing bed [place]?





Diagnosis/Problems


Diagnosis/Problems





(1) Delirium


Status:  Acute


(2) Pneumonia


Status:  Resolved


Qualifiers:  


   Pneumonia type:  due to unspecified organism  Laterality:  bilateral  Lung 

location:  lower lobe of lung  Qualified Codes:  J18.1 - Lobar pneumonia, 

unspecified organism


Resolution Date/Time:  12/13/18 @ 20:18


(3) Wheezing


Status:  Resolved


Resolution Date/Time:  12/12/18 @ 11:37


(4) Fever


Status:  Resolved


Qualifiers:  


   Fever type:  unspecified  Qualified Codes:  R50.9 - Fever, unspecified


Resolution Date/Time:  12/12/18 @ 11:37


(5) Pseudomonas urinary tract infection


Status:  Resolved


Resolution Date/Time:  12/13/18 @ 20:18


(6) Yeast UTI


Status:  Resolved


Resolution Date/Time:  12/13/18 @ 20:18


(7) Advanced age


Status:  Chronic


(8) Renal insufficiency


Status:  Resolved


Resolution Date/Time:  11/19/18 @ 09:41











LISA QUIÑONES DO Dec 16, 2018 11:44

## 2018-12-17 VITALS — DIASTOLIC BLOOD PRESSURE: 62 MMHG | SYSTOLIC BLOOD PRESSURE: 134 MMHG

## 2018-12-17 VITALS — DIASTOLIC BLOOD PRESSURE: 77 MMHG | SYSTOLIC BLOOD PRESSURE: 185 MMHG

## 2018-12-17 LAB
ALBUMIN SERPL-MCNC: 2.2 GM/DL (ref 3.2–4.5)
ALP SERPL-CCNC: 188 U/L (ref 40–136)
ALT SERPL-CCNC: 40 U/L (ref 0–55)
BASOPHILS # BLD AUTO: 0 10^3/UL (ref 0–0.1)
BASOPHILS NFR BLD AUTO: 0 % (ref 0–10)
BILIRUB SERPL-MCNC: 0.3 MG/DL (ref 0.1–1)
BUN/CREAT SERPL: 14
CALCIUM SERPL-MCNC: 7.7 MG/DL (ref 8.5–10.1)
CHLORIDE SERPL-SCNC: 107 MMOL/L (ref 98–107)
CO2 SERPL-SCNC: 22 MMOL/L (ref 21–32)
CREAT SERPL-MCNC: 1.14 MG/DL (ref 0.6–1.3)
EOSINOPHIL # BLD AUTO: 0.3 10^3/UL (ref 0–0.3)
EOSINOPHIL NFR BLD AUTO: 4 % (ref 0–10)
ERYTHROCYTE [DISTWIDTH] IN BLOOD BY AUTOMATED COUNT: 18.6 % (ref 10–14.5)
GFR SERPLBLD BASED ON 1.73 SQ M-ARVRAT: > 60 ML/MIN
GLUCOSE SERPL-MCNC: 127 MG/DL (ref 70–105)
HCT VFR BLD CALC: 25 % (ref 40–54)
HGB BLD-MCNC: 7.9 G/DL (ref 13.3–17.7)
LYMPHOCYTES # BLD AUTO: 1.5 X 10^3 (ref 1–4)
LYMPHOCYTES NFR BLD AUTO: 16 % (ref 12–44)
MANUAL DIFFERENTIAL PERFORMED BLD QL: NO
MCH RBC QN AUTO: 31 PG (ref 25–34)
MCHC RBC AUTO-ENTMCNC: 32 G/DL (ref 32–36)
MCV RBC AUTO: 96 FL (ref 80–99)
MONOCYTES # BLD AUTO: 1.1 X 10^3 (ref 0–1)
MONOCYTES NFR BLD AUTO: 11 % (ref 0–12)
NEUTROPHILS # BLD AUTO: 6.8 X 10^3 (ref 1.8–7.8)
NEUTROPHILS NFR BLD AUTO: 70 % (ref 42–75)
PLATELET # BLD: 268 10^3/UL (ref 130–400)
PMV BLD AUTO: 10 FL (ref 7.4–10.4)
POTASSIUM SERPL-SCNC: 4.5 MMOL/L (ref 3.6–5)
PROT SERPL-MCNC: 4.3 GM/DL (ref 6.4–8.2)
RBC # BLD AUTO: 2.58 10^6/UL (ref 4.35–5.85)
SODIUM SERPL-SCNC: 135 MMOL/L (ref 135–145)
WBC # BLD AUTO: 9.8 10^3/UL (ref 4.3–11)

## 2018-12-17 RX ADMIN — TAMSULOSIN HYDROCHLORIDE SCH MG: 0.4 CAPSULE ORAL at 18:16

## 2018-12-17 RX ADMIN — PHENAZOPYRIDINE HYDROCHLORIDE SCH MG: 100 TABLET ORAL at 08:47

## 2018-12-17 RX ADMIN — RISPERIDONE SCH MG: 0.25 TABLET, FILM COATED ORAL at 22:46

## 2018-12-17 RX ADMIN — SODIUM CHLORIDE SCH MLS/HR: 900 INJECTION, SOLUTION INTRAVENOUS at 14:07

## 2018-12-17 RX ADMIN — INSULIN ASPART SCH UNIT: 100 INJECTION, SOLUTION INTRAVENOUS; SUBCUTANEOUS at 16:00

## 2018-12-17 RX ADMIN — RISPERIDONE SCH MG: 0.25 TABLET, FILM COATED ORAL at 08:50

## 2018-12-17 RX ADMIN — PIOGLITAZONE SCH MG: 30 TABLET ORAL at 06:32

## 2018-12-17 RX ADMIN — SIMVASTATIN SCH MG: 10 TABLET, FILM COATED ORAL at 22:46

## 2018-12-17 RX ADMIN — BETHANECHOL CHLORIDE SCH MG: 25 TABLET ORAL at 10:24

## 2018-12-17 RX ADMIN — INSULIN ASPART SCH UNIT: 100 INJECTION, SOLUTION INTRAVENOUS; SUBCUTANEOUS at 23:49

## 2018-12-17 RX ADMIN — LISINOPRIL SCH MG: 20 TABLET ORAL at 08:48

## 2018-12-17 RX ADMIN — METOPROLOL TARTRATE SCH MG: 25 TABLET, FILM COATED ORAL at 22:46

## 2018-12-17 RX ADMIN — ENOXAPARIN SODIUM SCH MG: 100 INJECTION SUBCUTANEOUS at 10:23

## 2018-12-17 RX ADMIN — PHENAZOPYRIDINE HYDROCHLORIDE SCH MG: 100 TABLET ORAL at 13:21

## 2018-12-17 RX ADMIN — POTASSIUM CHLORIDE SCH MEQ: 750 TABLET, FILM COATED, EXTENDED RELEASE ORAL at 22:46

## 2018-12-17 RX ADMIN — BETHANECHOL CHLORIDE SCH MG: 25 TABLET ORAL at 16:34

## 2018-12-17 RX ADMIN — BETHANECHOL CHLORIDE SCH MG: 25 TABLET ORAL at 22:45

## 2018-12-17 RX ADMIN — Medication SCH EACH: at 22:46

## 2018-12-17 RX ADMIN — Medication SCH EACH: at 16:37

## 2018-12-17 RX ADMIN — BETHANECHOL CHLORIDE SCH MG: 25 TABLET ORAL at 06:33

## 2018-12-17 RX ADMIN — AMLODIPINE BESYLATE SCH MG: 5 TABLET ORAL at 08:48

## 2018-12-17 RX ADMIN — SENNOSIDES SCH MG: 8.6 TABLET, FILM COATED ORAL at 08:51

## 2018-12-17 RX ADMIN — OXYBUTYNIN CHLORIDE SCH MG: 5 TABLET ORAL at 13:21

## 2018-12-17 RX ADMIN — AMIODARONE HYDROCHLORIDE SCH MG: 200 TABLET ORAL at 08:48

## 2018-12-17 RX ADMIN — OXYBUTYNIN CHLORIDE SCH MG: 5 TABLET ORAL at 08:50

## 2018-12-17 RX ADMIN — PHENAZOPYRIDINE HYDROCHLORIDE SCH MG: 100 TABLET ORAL at 18:16

## 2018-12-17 RX ADMIN — INSULIN ASPART SCH UNIT: 100 INJECTION, SOLUTION INTRAVENOUS; SUBCUTANEOUS at 06:05

## 2018-12-17 RX ADMIN — METOPROLOL TARTRATE SCH MG: 25 TABLET, FILM COATED ORAL at 08:47

## 2018-12-17 RX ADMIN — POTASSIUM CHLORIDE SCH MEQ: 750 TABLET, FILM COATED, EXTENDED RELEASE ORAL at 08:47

## 2018-12-17 RX ADMIN — METFORMIN HYDROCHLORIDE SCH MG: 500 TABLET, FILM COATED ORAL at 06:33

## 2018-12-17 RX ADMIN — Medication SCH EA: at 06:32

## 2018-12-17 RX ADMIN — INSULIN ASPART SCH UNIT: 100 INJECTION, SOLUTION INTRAVENOUS; SUBCUTANEOUS at 11:19

## 2018-12-17 RX ADMIN — FAMOTIDINE SCH MG: 20 TABLET, FILM COATED ORAL at 08:49

## 2018-12-17 RX ADMIN — OXYBUTYNIN CHLORIDE SCH MG: 5 TABLET ORAL at 22:46

## 2018-12-17 RX ADMIN — METFORMIN HYDROCHLORIDE SCH MG: 500 TABLET, FILM COATED ORAL at 16:34

## 2018-12-17 RX ADMIN — Medication SCH EACH: at 06:33

## 2018-12-17 RX ADMIN — ALLOPURINOL SCH MG: 300 TABLET ORAL at 08:50

## 2018-12-17 RX ADMIN — Medication SCH EACH: at 10:24

## 2018-12-17 RX ADMIN — DOCUSATE SODIUM SCH MG: 100 CAPSULE ORAL at 08:51

## 2018-12-17 RX ADMIN — DOCUSATE SODIUM SCH MG: 100 CAPSULE ORAL at 23:48

## 2018-12-17 RX ADMIN — SENNOSIDES SCH MG: 8.6 TABLET, FILM COATED ORAL at 23:48

## 2018-12-17 NOTE — PROGRESS NOTE-HOSPITALIST
LISA QUIÑONES DO 12/17/18 1004:


Subjective


HPI/CC On Admission


Date Seen by Provider:  Dec 17, 2018


Time Seen by Provider:  09:30


Subjective/Events-last exam


Patient doing well overall


Conferred with Dr Bustos who can oversee the wound vac at [place]


Catheter will be attempted to be DC and if issues will put back in and I did 

confer with Dr Tawil


BM+





Review of Systems


Neurological:  Confusion





Objective


Exam


Vital Signs





Vital Signs








  Date Time  Temp Pulse Resp B/P (MAP) Pulse Ox O2 Delivery O2 Flow Rate FiO2


 


12/17/18 21:00      Room Air  


 


12/17/18 18:00 98.6 72 18 185/77 (113) 93  1.00 





Capillary Refill :


General Appearance:  No Apparent Distress, WD/WN, Chronically ill


Respiratory:  Chest Non Tender, Lungs Clear, Normal Breath Sounds, No Accessory 

Muscle Use, No Respiratory Distress


Cardiovascular:  Regular Rate, Rhythm, No Gallop, No JVD, No Murmur, Normal 

Peripheral Pulses


Back:  Decreased Range of Motion


Extremity:  Pedal Edema (+2)


Neurologic/Psychiatric:  Alert, Oriented x3, No Motor/Sensory Deficits, Normal 

Mood/Affect


Skin:  Normal Color, Warm/Dry





Results/Procedures


Lab


Laboratory Tests


12/17/18 05:20








Patient resulted labs reviewed.





Assessment/Plan


Assessment and Plan


Assess & Plan/Chief Complaint


Assessment: 


1A) s/p completed treatment for Pneumonia facility acquired  


1) Acute delirium improved


2) s/p Pseudomonas UTI completed treatment


3) Debility due to repeat surgeries


4) Intraspinal abscess


5) Urinary retention maintained on cath


6) Renal insufficiency stable


7) C. diff colitis (resolved)


8) Diabetes





PLan:


Continue treatment


Monitor


Monitor labs





Improved delirium


Increase PO intake


Abx DC since completed treatment


Swing bed [place]?


DC catheter





Diagnosis/Problems


Diagnosis/Problems





(1) Delirium


Status:  Acute


(2) Pneumonia


Status:  Resolved


Qualifiers:  


   Pneumonia type:  due to unspecified organism  Laterality:  bilateral  Lung 

location:  lower lobe of lung  Qualified Codes:  J18.1 - Lobar pneumonia, 

unspecified organism


Resolution Date/Time:  12/13/18 @ 20:18


(3) Wheezing


Status:  Resolved


Resolution Date/Time:  12/12/18 @ 11:37


(4) Fever


Status:  Resolved


Qualifiers:  


   Fever type:  unspecified  Qualified Codes:  R50.9 - Fever, unspecified


Resolution Date/Time:  12/12/18 @ 11:37


(5) Pseudomonas urinary tract infection


Status:  Resolved


Resolution Date/Time:  12/13/18 @ 20:18


(6) Yeast UTI


Status:  Resolved


Resolution Date/Time:  12/13/18 @ 20:18


(7) Advanced age


Status:  Chronic


(8) Renal insufficiency


Status:  Resolved


Resolution Date/Time:  11/19/18 @ 09:41





ISRAEL ESTEVEZ MED STUDENT 12/17/18 1054:


Subjective


Subjective/Events-last exam


Pt is doing well- awake in bed during interview


Nursing reports he was able to walk by himself with his walker last night


Hgb down 7.9


Denies any pain


No bowel/bladder dysfunction





Objective


Exam


General Appearance:  No Apparent Distress, Chronically ill, Obese


Neck:  Full Range of Motion


Respiratory:  Chest Non Tender, Lungs Clear, No Respiratory Distress


Cardiovascular:  Regular Rate, Rhythm, No Murmur, Normal Peripheral Pulses


Extremity:  Non Tender, Pedal Edema (+2)


Neurologic/Psychiatric:  Alert





Assessment/Plan


Assessment and Plan


Assess & Plan/Chief Complaint


Assessment:


s/p hospital acquired pneumonia


Acute delirium - improving


Anemia


s/p Pseudomonas UTI


s/p spinal surgeries


Debility


Urinary retention


Diabetes





Plan:


d/c nasal canula


d/c cath if cleared by urology


Continue to ambulate


[place] swing bed tomorrow





Diagnosis/Problems


Diagnosis/Problems





(1) Anemia


Status:  Acute


(2) Urinary retention


Status:  Acute


(3) Delirium


Status:  Acute


(4) Diabetes mellitus


Status:  Chronic


(5) UTI (urinary tract infection) due to Enterococcus


Status:  Resolved


Resolution Date/Time:  11/14/18 @ 13:55


(6) Renal insufficiency


Status:  Resolved


Resolution Date/Time:  11/19/18 @ 09:41


(7) Pseudomonas urinary tract infection


Status:  Resolved


Resolution Date/Time:  12/13/18 @ 20:18


(8) Edema


Status:  Acute











LISA QUIÑONES DO Dec 17, 2018 10:04


ISRAEL ESTEVEZ MED STUDENT Dec 17, 2018 10:54

## 2018-12-17 NOTE — OCCUPATIONAL THER DAILY NOTE
OT Current Status-Daily Note


Subjective


Pt seen in room, up in bed, agreeable to OT. No pain mentioned.





Appearance


Alert, cooperative. Wife reported he still ha a little confusion at times.





Mental Status/Objective


Therapy Code Descriptions/Definitions 





Functional [place] Measure:


0=Not Assessed/NA        4=Minimal Assistance


1=Total Assistance        5=Supervision or Setup


2=Maximal Assistance  6=Modified [place]


3=Moderate Assistance 7=Complete [place]





ADL-Treatment


Wife reported that she just set up his meal and he fed himself without 

assistance. He was also able to drink his Ensure without help. He brushed his 

teeth and washed his face with setup, no cues for sequencing. He has not been 

able to dress yet and still needs help putting on slipper socks due to LE 

edema. Luis catheter just removed today and he hasn't had to use the urinal 

yet. Has not been up to [facility] [place], still using bedpan. His wife reported that 

the back of the BSC presses on his wound/wound vac tubing. Bathing not 

addressed. Discharge delayed until tomorrow. Pt left up in bed, PT in room.


Therapy Code Descriptions/Definitions 





Functional [place] Measure:


0=Not Assessed/NA        4=Minimal Assistance


1=Total Assistance        5=Supervision or Setup


2=Maximal Assistance  6=Modified [place]


3=Moderate Assistance 7=Complete [place]








Therapy Quality Codes:


6    Independent with activity with or without an assistive device


5    Patient requires set up or clean up by helper.  Patient completes activity

  by  themselves


4    Supervision or touching assist (CGA). [place] provide cues , steadying 

assist


3    The helper provides less than half the effort to complete the activity


2    The helper provides more than half the effort to complete the activity


1    Dependent.  The helper does all the effort to complete an activity 


7    Patient refused to complete or attempt activity


9    The patient did not perform the activity before the current illness or 

injury


88  Not attempted due to Medical conditions or safety concerns


Eating (FIM):  5


Eating (QC):  5


Grooming (FIM):  5


Oral Hygiene (QC):  5


Upper Body Dressing (QC):  88


Lower Body Dressing (QC):  88


On/Off Footwear (QC):  1


Toileting Hygiene (QC):  1


Toilet Transfer (QC):  88





Education


OT Patient Education:  Progress toward Goal/Update tx plan, Purpose of tx/

functional activities


Teaching Recipient:  Patient, Family


Teaching Methods:  Discussion


Response to Teaching:  Verbalize Understanding, Return Demonstration





OT Short Term Goals


Short Term Goals


Time Frame:  Dec 17, 2018


Eating(FIM):  5


Grooming(FIM):  4


Bathing(FIM):  3


Upper Body Dressing(FIM):  4


Lower Body Dressing(FIM):  3


Toileting(FIM):  3


Toilet/Commode Transfer(FIM):  3


Additional Short Term Goals:  1-Demonstrate ADL Tasks, 2-Verbalize Understanding

, 3-ImproveStrength/[place]ina


1=Demonstrate adherence to instructed precautions during ADL tasks.


2=Patient will verbalize/demonstrate understanding of assistive devices/

modifications for ADL.


3=Patient will improve strength/tolerance for activity to enable patient to 

perform ADL's.





OT Long Term Goals


Long Term Goals


Time Frame:  Dec 29, 2018


Eating (FIM):  6


Eating (QC):  6


Groomin


Oral Hygiene (QC):  5


Bathing(FIM):  5


Shower/Bathe Self (QC):  4


Upper Body Dressing(FIM):  5


Upper Body Dressing (QC):  4


Lower Body Dressing(FIM):  5


Lower Body Dressing (QC):  4


On/Off Footwear (QC):  5


Toileting(FIM):  5


Toileting Hygiene (QC):  4


Toilet/Commode Transfer(FIM):  5


Toilet/Commode Transfer (QC):  4


Shower Transfer(FIM):  4


Additional Goals:  1-Demonstrate ADL Tasks, 2-Verbalize Understanding, 3-

ImproveStrength/Samina


1=Demonstrate adherence to instructed precautions during ADL tasks.


2=Patient will verbalize/demonstrate understanding of assistive devices/

modifications for ADL.


3=Patient will improve strength/tolerance for activity to enable patient to 

perform ADL's.





OT Education/Plan


Problem List/Assessment


Pt would benefit from skilled OT to increase his independence in basic self care





Discharge Recommendations


Plan/Recommendations:  Continue POC





Treatment Plan/Plan of Care


Patient would benefit from OT for education, treatment and training to promote 

independence in ADL's, mobility, safety and/or upper extremity function for ADL'

s.


Plan of Care:  ADL Retraining, Functional Mobility, UE Funct Exercise/Act, UE 

Neuromus Re-Ed/Coord


Treatment Duration:  Dec 29, 2018


Frequency:  5 times per week


Estimated Hrs Per Day:  .5 hour per day


Agreement:  Yes


Rehab Potential:  Guarded





Time/GCodes


Start Time:  13:12


Stop Time:  13:27


Total Time Billed (hr/min):  15


Billed Treatment Time


visit, 15 minutes ADL











FERNANDO SMITH OT Dec 17, 2018 13:37

## 2018-12-17 NOTE — PHYSICAL THERAPY DAILY NOTE
PT Daily Note-Current


Subjective


Patient and spouse agree to PT.  Report they will transfer to OSH tomorrow.





Pain





   Numeric Pain Scale:  5-Moderate Pain


   Location:  Medial, Lower


   Location Body Site:  Back


   Pain Description:  Ache, Pressure





Mental Status


Patient Orientation:  Person, Time, Situation





Transfers


Therapy Code Descriptions/Definitions 





Functional [place] Measure:


0=Not Assessed/NA        4=Minimal Assistance


1=Total Assistance        5=Supervision or Setup


2=Maximal Assistance  6=Modified [place]


3=Moderate Assistance 7=Complete [place]








Therapy Quality Codes:


6    Independent with activity with or without an assistive device


5    Patient requires set up or clean up by helper.  Patient completes activity

  by  themselves


4    Supervision or touching assist (CGA). [place] provide cues , steadying 

assist


3    The helper provides less than half the effort to complete the activity


2    The helper provides more than half the effort to complete the activity


1    Dependent.  The helper does all the effort to complete an activity 


7    Patient refused to complete or attempt activity


9    The patient did not perform the activity before the current illness or 

injury


88  Not attempted due to Medical conditions or safety concerns


Transfers (B, C, W/C) (FIM):  3


Scooting:  3


Rolling:  3


Roll Left to Right (QC):  3


Supine to/from Sit:  3


Sit to/from Stand:  3


Sit to Lying (QC):  3


Sit to Stand (QC):  3


sit to stand transfer to W x 4 sets with sidestepping to left.  left LE 

continues to "give out" during activity due to pain and weakness.





Weight Bearing


Right Lower Extremity:  Right


Full Weight Bearing


Left Lower Extremity:  Left


Full Weight Bearing





Assessment


Patient tolerated treatment well and returned to bed for wound care to change 

wound vac.  Plan dismissal to OSH tomorrow.





PT Long Term Goals


Long Term Goals


PT Long Term Goals Time Frame:  Dec 29, 2018


Transfers (B,C,W/C) (FIM):  4


Sit to Lying (QC):  4


Lying-Sitting on Side/Bed(QC):  4


Sit to Stand (QC):  4


Rollin


Roll Left to Right (QC):  4


Chair/Bed-to-Chair Xfer(QC):  4


Car Transfer (QC):  4


Does the Patient Walk:  No and Walking Goal IS indicated


Gait (FIM):  1


Gait distance (FIM):  1=up to 49 ft


Distance:  15'


Walk 10 feet (QC):  4


Walk 10ft-Uneven Surface(QC):  4


Walk 50ft with 2 Turns (QC):  88


Walk 150 ft (QC):  88


Gait Level of Assist:  4


Gait Assistive Device:  FWW





PT Plan


Treatment/Plan


Treatment Plan:  Continue Plan of Care


Treatment Plan:  Bed Mobility, Education, Functional Activity Samina, Functional 

Strength, Gait, Safety, Therapeutic Exercise, Transfers


Treatment Duration:  Dec 29, 2018


Frequency:  6-11/wk as patient medical status improves


Estimated Hrs Per Day:  .5 hour per day


Patient and/or Family Agrees t:  Yes





Time/GCodes


Time In:  1327


Time Out:  1342


Total Billed Treatment Time:  15


Total Billed Treatment


1 visit


FA 15 min











DANA CROOKS PT Dec 17, 2018 14:29

## 2018-12-17 NOTE — PHYSICAL THERAPY DAILY NOTE
PT Daily Note-Current


Subjective


Nsg reports pt may be discharging to [facility] this afternoon for medical care. 

Pt reports he has no pain when laying still, but some pain with movement





Appearance


upon arrival into pt's room, pt laying supine in bed with head elevated, 

confused.


At end of session, pt supine in bed with head and LE's elevated, call light 

phone and bedside table within reach





Mental Status


Patient Orientation:  Person, Eyes Open





Transfers


Therapy Code Descriptions/Definitions 





Functional [place] Measure:


0=Not Assessed/NA        4=Minimal Assistance


1=Total Assistance        5=Supervision or Setup


2=Maximal Assistance  6=Modified [place]


3=Moderate Assistance 7=Complete [place]








Therapy Quality Codes:


6    Independent with activity with or without an assistive device


5    Patient requires set up or clean up by helper.  Patient completes activity

  by  themselves


4    Supervision or touching assist (CGA). [place] provide cues , steadying 

assist


3    The helper provides less than half the effort to complete the activity


2    The helper provides more than half the effort to complete the activity


1    Dependent.  The helper does all the effort to complete an activity 


7    Patient refused to complete or attempt activity


9    The patient did not perform the activity before the current illness or 

injury


88  Not attempted due to Medical conditions or safety concerns


Transfers (B, C, W/C) (FIM):  3


Scooting:  3


Rollin


Roll Left to Right (QC):  4





Weight Bearing


Right Lower Extremity:  Right


Full Weight Bearing


Left Lower Extremity:  Left


Full Weight Bearing





Exercises


Supine Ex:  Bridging, Ankle pumps, Quad Set, Rolling, Heel Slides, Short Arc 

Quads, Straight leg raise, Hip abd/add


Supine Reps:  20 (10-20, worked each exercise to fatigue)


re instruction and constant redirection required with most exercises and 

activities. AROM to AAROM with supine exercises





Treatments


exercise, bed mobility rolling and scooting, report of fatigue by end of session





Assessment


Current Status:  Fair Progress


improving on rolling left and and right using bedrails, improving on scooting 

up in bed using bedrails and max verb inst, min assist. Too confused and 

fatigued to perform transfer training this am





PT Long Term Goals


Long Term Goals


PT Long Term Goals Time Frame:  Dec 29, 2018


Transfers (B,C,W/C) (FIM):  4


Sit to Lying (QC):  4


Lying-Sitting on Side/Bed(QC):  4


Sit to Stand (QC):  4


Rollin


Roll Left to Right (QC):  4


Chair/Bed-to-Chair Xfer(QC):  4


Car Transfer (QC):  4


Does the Patient Walk:  No and Walking Goal IS indicated


Gait (FIM):  1


Gait distance (FIM):  1=up to 49 ft


Distance:  15'


Walk 10 feet (QC):  4


Walk 10ft-Uneven Surface(QC):  4


Walk 50ft with 2 Turns (QC):  88


Walk 150 ft (QC):  88


Gait Level of Assist:  4


Gait Assistive Device:  FWW





PT Plan


Problem List


Problem List:  Activity Tolerance, Functional Strength, Safety, Gait, Transfer, 

Bed Mobility, ROM





Treatment/Plan


Treatment Plan:  Continue Plan of Care


Treatment Plan:  Bed Mobility, Education, Functional Activity Samina, Functional 

Strength, Gait, Safety, Therapeutic Exercise, Transfers


Treatment Duration:  Dec 29, 2018


Frequency:  6-11/wk as patient medical status improves


Estimated Hrs Per Day:  .5 hour per day


Patient and/or Family Agrees t:  Yes





Safety Risks/Education


reviewed importance of continuing exercises throughout the day





Time/GCodes


Time In:  817


Time Out:  856


Total Billed Treatment Time:  39


Total Billed Treatment


1 visit


FA 14


EX 25











JAYCOB HEARD PTA Dec 17, 2018 08:30

## 2018-12-18 VITALS — DIASTOLIC BLOOD PRESSURE: 63 MMHG | SYSTOLIC BLOOD PRESSURE: 151 MMHG

## 2018-12-18 VITALS — SYSTOLIC BLOOD PRESSURE: 151 MMHG | DIASTOLIC BLOOD PRESSURE: 63 MMHG

## 2018-12-18 RX ADMIN — BETHANECHOL CHLORIDE SCH MG: 25 TABLET ORAL at 11:49

## 2018-12-18 RX ADMIN — POTASSIUM CHLORIDE SCH MEQ: 750 TABLET, FILM COATED, EXTENDED RELEASE ORAL at 08:47

## 2018-12-18 RX ADMIN — METFORMIN HYDROCHLORIDE SCH MG: 500 TABLET, FILM COATED ORAL at 06:57

## 2018-12-18 RX ADMIN — SODIUM CHLORIDE SCH MG: 900 INJECTION, SOLUTION INTRAVENOUS at 08:47

## 2018-12-18 RX ADMIN — AMLODIPINE BESYLATE SCH MG: 5 TABLET ORAL at 08:46

## 2018-12-18 RX ADMIN — SENNOSIDES SCH MG: 8.6 TABLET, FILM COATED ORAL at 08:48

## 2018-12-18 RX ADMIN — AMIODARONE HYDROCHLORIDE SCH MG: 200 TABLET ORAL at 08:47

## 2018-12-18 RX ADMIN — Medication SCH EA: at 06:57

## 2018-12-18 RX ADMIN — INSULIN ASPART SCH UNIT: 100 INJECTION, SOLUTION INTRAVENOUS; SUBCUTANEOUS at 06:58

## 2018-12-18 RX ADMIN — RISPERIDONE SCH MG: 0.25 TABLET, FILM COATED ORAL at 08:51

## 2018-12-18 RX ADMIN — DOCUSATE SODIUM SCH MG: 100 CAPSULE ORAL at 08:47

## 2018-12-18 RX ADMIN — Medication SCH EACH: at 06:57

## 2018-12-18 RX ADMIN — PHENAZOPYRIDINE HYDROCHLORIDE SCH MG: 100 TABLET ORAL at 08:46

## 2018-12-18 RX ADMIN — SODIUM CHLORIDE SCH MLS/HR: 900 INJECTION, SOLUTION INTRAVENOUS at 11:50

## 2018-12-18 RX ADMIN — INSULIN ASPART SCH UNIT: 100 INJECTION, SOLUTION INTRAVENOUS; SUBCUTANEOUS at 11:36

## 2018-12-18 RX ADMIN — HALOPERIDOL LACTATE PRN MG: 5 INJECTION, SOLUTION INTRAMUSCULAR at 01:50

## 2018-12-18 RX ADMIN — FAMOTIDINE SCH MG: 20 TABLET, FILM COATED ORAL at 08:47

## 2018-12-18 RX ADMIN — PIOGLITAZONE SCH MG: 30 TABLET ORAL at 06:57

## 2018-12-18 RX ADMIN — LISINOPRIL SCH MG: 20 TABLET ORAL at 08:46

## 2018-12-18 RX ADMIN — METOPROLOL TARTRATE SCH MG: 25 TABLET, FILM COATED ORAL at 08:47

## 2018-12-18 RX ADMIN — BETHANECHOL CHLORIDE SCH MG: 25 TABLET ORAL at 06:57

## 2018-12-18 RX ADMIN — ALLOPURINOL SCH MG: 300 TABLET ORAL at 08:47

## 2018-12-18 RX ADMIN — ENOXAPARIN SODIUM SCH MG: 100 INJECTION SUBCUTANEOUS at 11:49

## 2018-12-18 RX ADMIN — PHENAZOPYRIDINE HYDROCHLORIDE SCH MG: 100 TABLET ORAL at 13:59

## 2018-12-18 RX ADMIN — Medication SCH EACH: at 11:49

## 2018-12-18 NOTE — THERAPY TEAM DISCHARGE SUMMARY
Therapy Discharge Summary


Discharge Recommendations


Date of Discharge


Dec 18, 2018 at 14:25


Therapy D/C Recommendations:  Home w/ Family Support, Nursing Home Placement, 

Skilled Nursing (TCU/NH)





Occupational Therapy


Pt admitted to [facility] status following back surgery/hardware failure. Pt had post 

op delirium, impacting ability to participate in therapy. At admission pt 

required mod assist for eating and grooming and was dependent for dressing and 

bathing. Skilled OT intervention focused on ADL training strengthening. Goals 

were addressed, but not met. Pt is able to feed self and complete grooming with 

set up, but still requires total assist for LE dressing. Pt is discharging this 

date to outside facility for continued care. D/c SWB OT at this time.


Decreased Activ Tolerance, Decreased UE Strength, Dependent Transfers, Impaired 

Bed Mobility, Impaired Cognition, Impaired Funct Balance, Impaired Self-Care 

Skills, Restricted Funct UE ROM





PT Long Term Goals


Long Term Goals


PT Long Term Goals Time Frame:  Dec 29, 2018


Transfers (B,C,W/C) (FIM):  4


Sit to Lying (QC):  4


Lying-Sitting on Side/Bed(QC):  4


Sit to Stand (QC):  4


Rolling:  3


Chair/Bed-to-Chair Xfer(QC):  4


Does the Patient Walk:  No and Walking Goal IS indicated


Gait (FIM):  1


Gait distance (FIM):  1=up to 49 ft


Distance:  15'


Walk 50ft with 2 Turns (QC):  88


Walk 150 ft (QC):  88


Gait Level of Assist:  4


Gait Assistive Device:  FWW





OT Long Term Goals


Long Term Goals


Time Frame:  Dec 29, 2018


Eating (FIM):  6


Eating (QC):  6


Groomin


Oral Hygiene (QC):  5


Bathing(FIM):  5


Upper Body Dressing(FIM):  5


Lower Body Dressing(FIM):  5


Toileting(FIM):  5


Toileting Hygiene (QC):  4


Toilet/Commode Transfer(FIM):  5


Toilet/Commode Transfer (QC):  4


Shower Transfer(FIM):  4


Additional Goals:  1-Demonstrate ADL Tasks, 2-Verbalize Understanding, 3-

ImproveStrength/Samina


1=Demonstrate adherence to instructed precautions during ADL tasks.


2=Patient will verbalize/demonstrate understanding of assistive devices/

modifications for ADL.


3=Patient will improve strength/tolerance for activity to enable patient to 

perform ADL's.











MANJINDER GIRON OT Dec 18, 2018 15:39

## 2018-12-18 NOTE — THERAPY TEAM DISCHARGE SUMMARY
Therapy Discharge Summary


Discharge Recommendations


Date of Discharge





Therapy D/C Recommendations:  Home w/ Family Support, Nursing Home Placement, 

Skilled Nursing (TCU/NH)





Physical Therapy


Patient transferring to OSH on this date for continued care.  Patient continues 

to require max to mod assist with all bed mobility and transfers and is able to 

take a few steps with FWW and max assist.  Patient left LE does "give out" 

during ambulation.  Patient upon evaluation required dependent assist with all 

mobility due to weakness and pain.  Patient continues to be confused and 

presents with 2+ pitting edema total body.  Patient will benefit from continued 

skilled therapy to improve current LOF.  Goals address but not attained.





Occupational Therapy


Decreased Activ Tolerance, Decreased UE Strength, Dependent Transfers, Impaired 

Bed Mobility, Impaired Cognition, Impaired Funct Balance, Impaired Self-Care 

Skills, Restricted Funct UE ROM





PT Long Term Goals


Long Term Goals


PT Long Term Goals Time Frame:  Dec 29, 2018


Transfers (B,C,W/C) (FIM):  4


Sit to Lying (QC):  4


Lying-Sitting on Side/Bed(QC):  4


Sit to Stand (QC):  4


Rolling:  3


Chair/Bed-to-Chair Xfer(QC):  4


Does the Patient Walk:  No and Walking Goal IS indicated


Gait (FIM):  1


Gait distance (FIM):  1=up to 49 ft


Distance:  15'


Walk 50ft with 2 Turns (QC):  88


Walk 150 ft (QC):  88


Gait Level of Assist:  4


Gait Assistive Device:  FWW





OT Long Term Goals


Long Term Goals


Time Frame:  Dec 29, 2018


Eating (FIM):  6


Eating (QC):  6


Groomin


Oral Hygiene (QC):  5


Bathing(FIM):  5


Upper Body Dressing(FIM):  5


Lower Body Dressing(FIM):  5


Toileting(FIM):  5


Toileting Hygiene (QC):  4


Toilet/Commode Transfer(FIM):  5


Toilet/Commode Transfer (QC):  4


Shower Transfer(FIM):  4


Additional Goals:  1-Demonstrate ADL Tasks, 2-Verbalize Understanding, 3-

ImproveStrength/Samina


1=Demonstrate adherence to instructed precautions during ADL tasks.


2=Patient will verbalize/demonstrate understanding of assistive devices/

modifications for ADL.


3=Patient will improve strength/tolerance for activity to enable patient to 

perform ADL's.











DANA CROOKS PT Dec 18, 2018 11:13

## 2018-12-18 NOTE — DISCHARGE SUMMARY-HOSPITALIST
LISA QUIÑONES DO 12/18/18 0957:


Diagnosis/Chief Complaint


Date of Admission


Dec 10, 2018 at 10:12


Date of Discharge





Discharge Date:  Dec 18, 2018


Discharge Diagnosis





(1) Anemia


Status:  Chronic


(2) Urinary retention


Status:  Acute


(3) Delirium


Status:  Acute


(4) Diabetes mellitus


Status:  Chronic


(5) UTI (urinary tract infection) due to Enterococcus


Status:  Resolved


(6) Renal insufficiency


Status:  Resolved


(7) Pseudomonas urinary tract infection


Status:  Resolved


(8) Edema


Status:  Acute





Discharge Summary


Discharge Physical Exam


Allergies:  


Coded Allergies:  


     cephalexin (Verified  Allergy, Unknown, 11/16/18)


     hydrocodone (Verified  Allergy, Unknown, 11/16/18)


Vitals & I&Os





Vital Signs








  Date Time  Temp Pulse Resp B/P (MAP) Pulse Ox O2 Delivery O2 Flow Rate FiO2


 


12/18/18 14:25  70 20 151/63 94 Room Air  


 


12/18/18 08:00 98.2      1.00 








General Appearance:  No Apparent Distress, WD/WN, Chronically ill, Obese


Respiratory:  Chest Non Tender, Lungs Clear, Normal Breath Sounds, No Accessory 

Muscle Use, No Respiratory Distress


Cardiovascular:  Regular Rate, Rhythm, No Edema, No Gallop, No JVD, No Murmur, 

Normal Peripheral Pulses


Skin:  Normal Color, Warm/Dry


Neurologic/Psychiatric:  Alert, Oriented x3, No Motor/Sensory Deficits, Normal 

Mood/Affect





Hospital Course


Hospital course: patient was admitted to swing bed for wound vac management and 

close monitoring of co-morbidities. Pt tolerated Vanc well and will complete 

that on 1/11/19. Midline was maintained without event. Pseudomonas UTI dx along 

with pneumonia and completed Cefepime treatment. Delirium was a challenge 

during the entire hospital course and required Risperdal and Haldol with good 

results. Overall patient improved and was able to be transported closer to home 

in [place] on Swing bed to Dr Ritter's service.I updated Dr Bustos personally.


Labs (last 24 hrs)


Laboratory Tests


12/17/18 21:32: Glucometer 194H


12/18/18 05:46: Glucometer 111H


12/18/18 10:38: Vancomycin Level Trough 11.8


12/18/18 11:18: Glucometer 127H





Microbiology


12/17/18 C. difficile GDH Antigen & Toxins - Final, Complete


           


Patient resulted labs reviewed.


Pending Labs


Laboratory Tests


12/18/18 10:38: Vancomycin Level Trough 11.8


12/18/18 11:18: Glucometer 127








Discussion & Recommendations


Discharge Planning:  <30 minutes discharge planning





Discharge


Home Medications:





Active Scripts


Active


Vancomycin 1 G/100Ml-0.9% NaCl (Vancomycin/0.9 % Sod Chloride) 1 Gm/100 Ml 

Plast..bag 1 Gm IV Q48H


     NEXT DOSE DUE ON 12/20 LAST DOSE IS DUE ON 1/11/19


Famotidine 20 Mg Tablet 20 Mg PO DAILY 30 Days


Klor-Con 10 (Potassium Chloride) 10 Meq Tablet.er 10 Meq PO BID 30 Days


Risperidone 0.25 Mg Tablet 0.5 Mg PO BID 30 Days


Haloperidol Lactate 5 Mg/1 Ml Vial 1 Mg IM Q6HR PRN 30 Days


Enoxaparin Sodium 40 Mg/0.4 Ml Syringe 40 Mg SC DAILY@1030 30 Days


Novolog (Insulin Aspart) 100 Unit/1 Ml Susp 0 Unit SC ACHS 30 Days


Floranex Granules Packet (L. Acidophilus/Bulgaricus) 1 Each Gran.pack 1 Gm PO 

ACHS 30 Days


Phenazopyridine HCl 100 Mg Tablet 100 Mg PO TIDPC 30 Days


Tramadol HCl 50 Mg Tablet  Mg PO Q4H PRN 10 Days


Amlodipine Besylate 5 Mg Tablet 5 Mg PO DAILY 30 Days


Metoprolol Tartrate 25 Mg Tablet 25 Mg PO BID 30 Days


Flomax (Tamsulosin HCl) 0.4 Mg Cap 0.4 Mg PO DAILY@1800 30 Days


Bethanechol Chloride 10 Mg Tablet 10 Mg PO ACHS 30 Days


Reported


Amiodarone HCl 200 Mg Tablet 200 Mg PO DAILY


Metformin HCl 500 Mg Tablet 500 Mg PO BID


Pioglitazone HCl 45 Mg Tablet 45 Mg PO DAILY


Lovastatin 20 Mg Tablet 20 Mg PO DAILY


Allopurinol 300 Mg Tablet 300 Mg PO DAILY


Lisinopril 20 Mg Tablet 20 Mg PO DAILY





Instructions to patient/family


Please see electronic discharge instructions given to patient.





ISRAEL ESTEVEZ MED STUDENT 12/18/18 1128:


Discharge Summary


Discharge Physical Exam


Allergies:  


Coded Allergies:  


     cephalexin (Verified  Allergy, Unknown, 11/16/18)


     hydrocodone (Verified  Allergy, Unknown, 11/16/18)


General Appearance:  Chronically ill, Obese


Respiratory:  Chest Non Tender, Lungs Clear, No Respiratory Distress


Cardiovascular:  Regular Rate, Rhythm, No Murmur


Extremity:  Non Tender, Pedal Edema


Skin:  Normal Color, Warm/Dry, Pallor


Neurologic/Psychiatric:  No Motor/Sensory Deficits, Normal Mood/Affect





Hospital Course


This is an 77 yo male who was admitted to swing-bed on 12/10 after a 

complicated hospital stay for close observation due to multiple previous spinal 

surgeries, Pseudomonas UTI, pneumonia, and delirium. His UTI and pneumonia 

resolved on abx without any complications. Pt continues to have urinary 

retention and has failed attempts at removing catheter. His delirium waxed and 

waned throughout his stay but is improving overall. Pt will be d/jeana to 

[facility] where he can continue his recovery closer to home.





Problem Qualifiers





(1) Anemia:  


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

chronic)


(2) Diabetes mellitus:  


Diabetes mellitus type:  type 2  Diabetes mellitus long term insulin use:  

without long term use  Diabetes mellitus complication status:  with circulatory 

complication  Diabetes mellitus complication detail:  with other circulatory 

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 

circulatory complications


(3) Edema:  


Edema type:  localized  Qualified Codes:  R60.0 - Localized edema








LISA QUIÑONES DO Dec 18, 2018 09:57


ISRAEL ESTEVEZ MED STUDENT Dec 18, 2018 11:28